# Patient Record
Sex: MALE | Race: WHITE | NOT HISPANIC OR LATINO
[De-identification: names, ages, dates, MRNs, and addresses within clinical notes are randomized per-mention and may not be internally consistent; named-entity substitution may affect disease eponyms.]

---

## 2017-01-11 ENCOUNTER — APPOINTMENT (OUTPATIENT)
Dept: DERMATOLOGY | Facility: CLINIC | Age: 62
End: 2017-01-11

## 2019-05-20 ENCOUNTER — TRANSCRIPTION ENCOUNTER (OUTPATIENT)
Age: 64
End: 2019-05-20

## 2019-09-30 ENCOUNTER — APPOINTMENT (OUTPATIENT)
Dept: DERMATOLOGY | Facility: CLINIC | Age: 64
End: 2019-09-30
Payer: MEDICARE

## 2019-09-30 PROCEDURE — 99214 OFFICE O/P EST MOD 30 MIN: CPT

## 2020-10-01 ENCOUNTER — APPOINTMENT (OUTPATIENT)
Dept: DERMATOLOGY | Facility: CLINIC | Age: 65
End: 2020-10-01
Payer: MEDICARE

## 2020-10-01 PROCEDURE — 99213 OFFICE O/P EST LOW 20 MIN: CPT

## 2021-11-02 ENCOUNTER — APPOINTMENT (OUTPATIENT)
Dept: DERMATOLOGY | Facility: CLINIC | Age: 66
End: 2021-11-02
Payer: MEDICARE

## 2021-11-02 PROCEDURE — 99213 OFFICE O/P EST LOW 20 MIN: CPT

## 2022-01-20 ENCOUNTER — NON-APPOINTMENT (OUTPATIENT)
Age: 67
End: 2022-01-20

## 2022-01-25 ENCOUNTER — APPOINTMENT (OUTPATIENT)
Dept: GASTROENTEROLOGY | Facility: CLINIC | Age: 67
End: 2022-01-25
Payer: MEDICARE

## 2022-01-25 VITALS
WEIGHT: 280 LBS | TEMPERATURE: 97.8 F | OXYGEN SATURATION: 95 % | HEART RATE: 67 BPM | HEIGHT: 71 IN | DIASTOLIC BLOOD PRESSURE: 75 MMHG | BODY MASS INDEX: 39.2 KG/M2 | SYSTOLIC BLOOD PRESSURE: 140 MMHG

## 2022-01-25 DIAGNOSIS — Z78.9 OTHER SPECIFIED HEALTH STATUS: ICD-10-CM

## 2022-01-25 DIAGNOSIS — Z85.46 PERSONAL HISTORY OF MALIGNANT NEOPLASM OF PROSTATE: ICD-10-CM

## 2022-01-25 PROCEDURE — 99203 OFFICE O/P NEW LOW 30 MIN: CPT

## 2022-01-25 RX ORDER — HUMAN INSULIN 100 [IU]/ML
100 INJECTION, SUSPENSION SUBCUTANEOUS
Qty: 200 | Refills: 0 | Status: ACTIVE | COMMUNITY
Start: 2022-01-18

## 2022-01-25 RX ORDER — POLYETHYLENE GLYCOL 3350 AND ELECTROLYTES WITH LEMON FLAVOR 236; 22.74; 6.74; 5.86; 2.97 G/4L; G/4L; G/4L; G/4L; G/4L
236 POWDER, FOR SOLUTION ORAL
Qty: 1 | Refills: 0 | Status: ACTIVE | COMMUNITY
Start: 2022-01-25 | End: 1900-01-01

## 2022-01-25 RX ORDER — ATORVASTATIN CALCIUM 80 MG/1
80 TABLET, FILM COATED ORAL
Qty: 90 | Refills: 0 | Status: ACTIVE | COMMUNITY
Start: 2021-09-09

## 2022-01-25 RX ORDER — METOPROLOL SUCCINATE 25 MG/1
25 TABLET, EXTENDED RELEASE ORAL
Qty: 90 | Refills: 0 | Status: ACTIVE | COMMUNITY
Start: 2021-10-26

## 2022-01-25 RX ORDER — RAMIPRIL 2.5 MG/1
2.5 CAPSULE ORAL
Qty: 180 | Refills: 0 | Status: ACTIVE | COMMUNITY
Start: 2022-01-17

## 2022-01-25 RX ORDER — FENOFIBRATE 160 MG/1
160 TABLET ORAL
Qty: 90 | Refills: 0 | Status: ACTIVE | COMMUNITY
Start: 2021-11-18

## 2022-01-25 NOTE — HISTORY OF PRESENT ILLNESS
[None] : had no significant interval events [Heartburn] : denies heartburn [Nausea] : denies nausea [Vomiting] : denies vomiting [Diarrhea] : denies diarrhea [Constipation] : stable constipation [Yellow Skin Or Eyes (Jaundice)] : denies jaundice [Abdominal Pain] : denies abdominal pain [Abdominal Swelling] : denies abdominal swelling [Rectal Pain] : denies rectal pain [Wt Gain ___ Lbs] : no recent weight gain [Wt Loss ___ Lbs] : no recent weight loss [GERD] : no gastroesophageal reflux disease [Hiatus Hernia] : no hiatus hernia [Peptic Ulcer Disease] : no peptic ulcer disease [Pancreatitis] : no pancreatitis [Cholelithiasis] : no cholelithiasis [Kidney Stone] : no kidney stone [Inflammatory Bowel Disease] : no inflammatory bowel disease [Irritable Bowel Syndrome] : no irritable bowel syndrome [Diverticulitis] : no diverticulitis [Alcohol Abuse] : no alcohol abuse [Malignancy] : no malignancy [Abdominal Surgery] : no abdominal surgery [Appendectomy] : no appendectomy [Cholecystectomy] : no cholecystectomy [de-identified] : This is the patient's first visit here [de-identified] : Patient presents for initial evaluation for repeat low rescreening colonoscopy stating that he had a negative colonoscopy done roughly 20 years ago.  He is chronically constipated but has had no worsening of his chronic constipation and denies gross hematochezia or melena or abdominal pain or unexplained weight loss or anorexia and has no family history of first-degree relatives with colon cancer or colon polyps.

## 2022-01-25 NOTE — ASSESSMENT
[FreeTextEntry1] : Impression: Colon cancer screening rule out colonic neoplasm.  Patient's last colonoscopy was roughly 20 years ago and was negative.\par \par Recommendations: Repeat low risk screening colonoscopy was advised for further evaluation of the above.  The risk versus benefits of low rescreening colonoscopy and intravenous sedation, and alternative testing such as virtual colonoscopy, were individually explained to the patient today who appeared to understand all of the above and was agreeable to proceeding with repeat screening colonoscopy.  Preprocedure cardiac clearance was requested.  His ASA classification is 3.  He will be prepped with GoLYTELY and pending cardiac clearance will be medically optimized for the proposed colonoscopy and appeared to understand all of the above instructions, information, and management plan.

## 2022-01-25 NOTE — REVIEW OF SYSTEMS
[Negative] : Heme/Lymph [As Noted in HPI] : as noted in HPI [Constipation] : constipation [Abdominal Pain] : no abdominal pain [Vomiting] : no vomiting [Diarrhea] : no diarrhea [Heartburn] : no heartburn [Melena] : no melena

## 2022-04-05 ENCOUNTER — LABORATORY RESULT (OUTPATIENT)
Age: 67
End: 2022-04-05

## 2022-04-07 ENCOUNTER — TRANSCRIPTION ENCOUNTER (OUTPATIENT)
Age: 67
End: 2022-04-07

## 2022-04-08 ENCOUNTER — RESULT REVIEW (OUTPATIENT)
Age: 67
End: 2022-04-08

## 2022-04-08 ENCOUNTER — OUTPATIENT (OUTPATIENT)
Dept: OUTPATIENT SERVICES | Facility: HOSPITAL | Age: 67
LOS: 1 days | End: 2022-04-08
Payer: MEDICARE

## 2022-04-08 ENCOUNTER — APPOINTMENT (OUTPATIENT)
Dept: GASTROENTEROLOGY | Facility: GI CENTER | Age: 67
End: 2022-04-08
Payer: MEDICARE

## 2022-04-08 DIAGNOSIS — Z98.89 OTHER SPECIFIED POSTPROCEDURAL STATES: Chronic | ICD-10-CM

## 2022-04-08 DIAGNOSIS — Z12.11 ENCOUNTER FOR SCREENING FOR MALIGNANT NEOPLASM OF COLON: ICD-10-CM

## 2022-04-08 DIAGNOSIS — K64.8 OTHER HEMORRHOIDS: ICD-10-CM

## 2022-04-08 DIAGNOSIS — D12.2 BENIGN NEOPLASM OF ASCENDING COLON: ICD-10-CM

## 2022-04-08 LAB — GLUCOSE BLDC GLUCOMTR-MCNC: 154 MG/DL — HIGH (ref 70–99)

## 2022-04-08 PROCEDURE — 45385 COLONOSCOPY W/LESION REMOVAL: CPT | Mod: PT

## 2022-04-08 PROCEDURE — 82962 GLUCOSE BLOOD TEST: CPT

## 2022-04-08 PROCEDURE — 88305 TISSUE EXAM BY PATHOLOGIST: CPT | Mod: 26

## 2022-04-08 PROCEDURE — 88305 TISSUE EXAM BY PATHOLOGIST: CPT

## 2022-04-13 LAB — SURGICAL PATHOLOGY STUDY: SIGNIFICANT CHANGE UP

## 2022-11-03 ENCOUNTER — APPOINTMENT (OUTPATIENT)
Dept: DERMATOLOGY | Facility: CLINIC | Age: 67
End: 2022-11-03

## 2022-11-03 PROCEDURE — 99213 OFFICE O/P EST LOW 20 MIN: CPT

## 2023-11-02 ENCOUNTER — APPOINTMENT (OUTPATIENT)
Dept: DERMATOLOGY | Facility: CLINIC | Age: 68
End: 2023-11-02
Payer: MEDICARE

## 2023-11-02 PROCEDURE — 99213 OFFICE O/P EST LOW 20 MIN: CPT

## 2024-02-14 ENCOUNTER — APPOINTMENT (OUTPATIENT)
Dept: RHEUMATOLOGY | Facility: CLINIC | Age: 69
End: 2024-02-14
Payer: MEDICARE

## 2024-02-14 VITALS
OXYGEN SATURATION: 95 % | HEIGHT: 71 IN | SYSTOLIC BLOOD PRESSURE: 142 MMHG | TEMPERATURE: 97.1 F | WEIGHT: 258 LBS | BODY MASS INDEX: 36.12 KG/M2 | DIASTOLIC BLOOD PRESSURE: 74 MMHG | HEART RATE: 76 BPM | RESPIRATION RATE: 17 BRPM

## 2024-02-14 DIAGNOSIS — M79.675 PAIN IN LEFT TOE(S): ICD-10-CM

## 2024-02-14 DIAGNOSIS — Z86.39 PERSONAL HISTORY OF OTHER ENDOCRINE, NUTRITIONAL AND METABOLIC DISEASE: ICD-10-CM

## 2024-02-14 PROCEDURE — 99204 OFFICE O/P NEW MOD 45 MIN: CPT

## 2024-02-14 RX ORDER — DULAGLUTIDE 0.75 MG/.5ML
0.75 INJECTION, SOLUTION SUBCUTANEOUS
Qty: 6 | Refills: 0 | Status: DISCONTINUED | COMMUNITY
Start: 2022-01-18 | End: 2024-02-14

## 2024-02-14 RX ORDER — TIRZEPATIDE 10 MG/.5ML
10 INJECTION, SOLUTION SUBCUTANEOUS
Refills: 0 | Status: ACTIVE | COMMUNITY

## 2024-02-14 RX ORDER — EZETIMIBE 10 MG/1
10 TABLET ORAL
Refills: 0 | Status: ACTIVE | COMMUNITY

## 2024-02-20 PROBLEM — M79.675 PAIN AROUND TOENAIL, LEFT FOOT: Status: ACTIVE | Noted: 2024-02-20

## 2024-02-20 NOTE — ASSESSMENT
[FreeTextEntry1] : Currently asymptomatic Ulcer to plantar aspect of Left big toe  ROS and PE otherwise unremarkable for underlying classic CTD/ systemic autoimmune disease/ inflammatory arthritis/ gout  - wound care follow up - f/u as needed  Discussed treatment plan with the patient and his wife. The patient was given the opportunity to ask questions and all questions were answered to their satisfaction.

## 2024-02-20 NOTE — HISTORY OF PRESENT ILLNESS
[FreeTextEntry1] : 68 year old male with PMH as listed below presents today for an initial evaluation for joint pain  Reports to have 1 year hx of ulcer/open wound under Left big toe.  He was following podiatry and recently also started following wound care physician Over the last 4-5 months noticed Left toe anterior also appears to be slightly red Denies pain/swelling to the joint He is presenting today as he is concerned he may have gout No prior attacks  Chart/ labs reviewed  Currently asymptomatic Denies pain/swelling to other joints  denies fever, chills, oral/nasal ulcers, denies chest pain, chest palpitations, denies sob, denies nausea, vomiting, abdominal pain, denies rashes, photosensitivity

## 2024-02-28 ENCOUNTER — APPOINTMENT (OUTPATIENT)
Dept: RHEUMATOLOGY | Facility: CLINIC | Age: 69
End: 2024-02-28

## 2024-05-02 ENCOUNTER — APPOINTMENT (OUTPATIENT)
Dept: DERMATOLOGY | Facility: CLINIC | Age: 69
End: 2024-05-02
Payer: MEDICARE

## 2024-05-02 PROCEDURE — 99213 OFFICE O/P EST LOW 20 MIN: CPT

## 2024-10-03 ENCOUNTER — INPATIENT (INPATIENT)
Facility: HOSPITAL | Age: 69
LOS: 7 days | Discharge: HOME | DRG: 373 | End: 2024-10-11
Attending: STUDENT IN AN ORGANIZED HEALTH CARE EDUCATION/TRAINING PROGRAM | Admitting: STUDENT IN AN ORGANIZED HEALTH CARE EDUCATION/TRAINING PROGRAM
Payer: MEDICARE

## 2024-10-03 VITALS
DIASTOLIC BLOOD PRESSURE: 63 MMHG | SYSTOLIC BLOOD PRESSURE: 148 MMHG | RESPIRATION RATE: 18 BRPM | HEART RATE: 91 BPM | HEIGHT: 71 IN | WEIGHT: 244.05 LBS | OXYGEN SATURATION: 96 % | TEMPERATURE: 98 F

## 2024-10-03 DIAGNOSIS — Z98.89 OTHER SPECIFIED POSTPROCEDURAL STATES: Chronic | ICD-10-CM

## 2024-10-03 LAB
ALBUMIN SERPL ELPH-MCNC: 3.8 G/DL — SIGNIFICANT CHANGE UP (ref 3.3–5.2)
ALP SERPL-CCNC: 85 U/L — SIGNIFICANT CHANGE UP (ref 40–120)
ALT FLD-CCNC: 14 U/L — SIGNIFICANT CHANGE UP
ANION GAP SERPL CALC-SCNC: 15 MMOL/L — SIGNIFICANT CHANGE UP (ref 5–17)
APTT BLD: 28.5 SEC — SIGNIFICANT CHANGE UP (ref 24.5–35.6)
AST SERPL-CCNC: 20 U/L — SIGNIFICANT CHANGE UP
BASOPHILS # BLD AUTO: 0.04 K/UL — SIGNIFICANT CHANGE UP (ref 0–0.2)
BASOPHILS NFR BLD AUTO: 0.3 % — SIGNIFICANT CHANGE UP (ref 0–2)
BILIRUB SERPL-MCNC: 0.7 MG/DL — SIGNIFICANT CHANGE UP (ref 0.4–2)
BUN SERPL-MCNC: 27.5 MG/DL — HIGH (ref 8–20)
CALCIUM SERPL-MCNC: 8.5 MG/DL — SIGNIFICANT CHANGE UP (ref 8.4–10.5)
CHLORIDE SERPL-SCNC: 102 MMOL/L — SIGNIFICANT CHANGE UP (ref 96–108)
CO2 SERPL-SCNC: 21 MMOL/L — LOW (ref 22–29)
CREAT SERPL-MCNC: 1.3 MG/DL — SIGNIFICANT CHANGE UP (ref 0.5–1.3)
EGFR: 59 ML/MIN/1.73M2 — LOW
EOSINOPHIL # BLD AUTO: 0.04 K/UL — SIGNIFICANT CHANGE UP (ref 0–0.5)
EOSINOPHIL NFR BLD AUTO: 0.3 % — SIGNIFICANT CHANGE UP (ref 0–6)
GLUCOSE SERPL-MCNC: 237 MG/DL — HIGH (ref 70–99)
HCT VFR BLD CALC: 32.9 % — LOW (ref 39–50)
HGB BLD-MCNC: 10.3 G/DL — LOW (ref 13–17)
IMM GRANULOCYTES NFR BLD AUTO: 0.3 % — SIGNIFICANT CHANGE UP (ref 0–0.9)
INR BLD: 1.06 RATIO — SIGNIFICANT CHANGE UP (ref 0.85–1.16)
LIDOCAIN IGE QN: 19 U/L — LOW (ref 22–51)
LYMPHOCYTES # BLD AUTO: 1.22 K/UL — SIGNIFICANT CHANGE UP (ref 1–3.3)
LYMPHOCYTES # BLD AUTO: 10.7 % — LOW (ref 13–44)
MCHC RBC-ENTMCNC: 25.5 PG — LOW (ref 27–34)
MCHC RBC-ENTMCNC: 31.3 GM/DL — LOW (ref 32–36)
MCV RBC AUTO: 81.4 FL — SIGNIFICANT CHANGE UP (ref 80–100)
MONOCYTES # BLD AUTO: 0.71 K/UL — SIGNIFICANT CHANGE UP (ref 0–0.9)
MONOCYTES NFR BLD AUTO: 6.2 % — SIGNIFICANT CHANGE UP (ref 2–14)
NEUTROPHILS # BLD AUTO: 9.38 K/UL — HIGH (ref 1.8–7.4)
NEUTROPHILS NFR BLD AUTO: 82.2 % — HIGH (ref 43–77)
PLATELET # BLD AUTO: 274 K/UL — SIGNIFICANT CHANGE UP (ref 150–400)
POTASSIUM SERPL-MCNC: 4.5 MMOL/L — SIGNIFICANT CHANGE UP (ref 3.5–5.3)
POTASSIUM SERPL-SCNC: 4.5 MMOL/L — SIGNIFICANT CHANGE UP (ref 3.5–5.3)
PROT SERPL-MCNC: 7.2 G/DL — SIGNIFICANT CHANGE UP (ref 6.6–8.7)
PROTHROM AB SERPL-ACNC: 12.3 SEC — SIGNIFICANT CHANGE UP (ref 9.9–13.4)
RBC # BLD: 4.04 M/UL — LOW (ref 4.2–5.8)
RBC # FLD: 15.8 % — HIGH (ref 10.3–14.5)
SODIUM SERPL-SCNC: 138 MMOL/L — SIGNIFICANT CHANGE UP (ref 135–145)
WBC # BLD: 11.43 K/UL — HIGH (ref 3.8–10.5)
WBC # FLD AUTO: 11.43 K/UL — HIGH (ref 3.8–10.5)

## 2024-10-03 PROCEDURE — 99285 EMERGENCY DEPT VISIT HI MDM: CPT | Mod: GC

## 2024-10-03 PROCEDURE — 71045 X-RAY EXAM CHEST 1 VIEW: CPT | Mod: 26

## 2024-10-03 RX ORDER — SODIUM CHLORIDE 0.9 % (FLUSH) 0.9 %
2300 SYRINGE (ML) INJECTION ONCE
Refills: 0 | Status: COMPLETED | OUTPATIENT
Start: 2024-10-03 | End: 2024-10-03

## 2024-10-03 RX ADMIN — Medication 2300 MILLILITER(S): at 22:40

## 2024-10-03 NOTE — ED PROVIDER NOTE - PHYSICAL EXAMINATION
General: Awake, alert, lying in bed in NAD  HEENT: Normocephalic, atraumatic. No scleral icterus or conjunctival injection. EOMI. Moist mucous membranes. Oropharynx clear.   Neck:. Soft and supple.  Cardiac: RRR, Peripheral pulses 2+ and symmetric. No LE edema.  Resp: Lungs CTAB. No accessory muscle use  Abd: Soft, left and midline lower abdominal tenderness, non-distended. No guarding, rebound, or rigidity.  Back: Spine midline and non-tender.   Skin: No rashes, abrasions, or lacerations.  Neuro: AO x 4. Moves all extremities symmetrically. Motor strength and sensation grossly intact.  Psych: Appropriate mood and affect

## 2024-10-03 NOTE — ED PROVIDER NOTE - CLINICAL SUMMARY MEDICAL DECISION MAKING FREE TEXT BOX
69y male w/ pmh of HTN, HLD, DM, CABGx3, prostate removal presenting with a two day history of lower abdominal pain. this morning he had an episode of vomiting and he developed chills a few hours ago. patient was seen at urgent care and told to come to the ED for imaging. patient also noted loose stool today. denies any blood in the vomit or stool. denies cough, congestion, CP, SOB, urinary symptoms. denies any sick contacts or recent travel. states he was chopping wood a day ago and may have strained a muscle. 69y male w/ pmh of HTN, HLD, DM, CABGx3, prostate removal presenting with a two day history of lower abdominal pain. patient with moderate lower abdominal pain on exam, otherwise well appearing, nontoxic. septic workup sent given hx of fever. CT scan showing perforated appendicitis and cecal mass. surgery consulted. patient to be admitted for further evaluation and treatment by surgical team.

## 2024-10-03 NOTE — ED PROVIDER NOTE - NSICDXPASTMEDICALHX_GEN_ALL_CORE_FT
PAST MEDICAL HISTORY:  Arthritis     Diabetes mellitus Type 1    Hyperlipidemia     Hypertension

## 2024-10-03 NOTE — ED ADULT TRIAGE NOTE - IDEAL BODY WEIGHT(KG)
PATIENT NAME: NEGIN WHITEHEAD   MEDICAL RECORD NUMBER: [id number]   ACCOUNT NUMBER: [id number]   ATTENDING PHYSICIAN: GABI RODRIGUEZ M.D.   ADMIT DATE: 03/01/2017   DISCHARGE DATE: 03/01/2017   ROOM #:   SERVICE: NUC                                 Pharmacological Stress Test         DATE OF STUDY:  03/01/2017      Dictated By:  Gabi Rodriguez M.D.      STUDY PERFORMED:  Lexiscan stress test with SPECT imaging.      REASON FOR STUDY:  The patient with coronary artery disease.      FINDINGS:  After informed consent was obtained, the patient was brought forward   to the noninvasive cardiac laboratory at [facility], where he   underwent a Lexiscan stress test with SPECT imaging.      Resting heart rate was 67.  Resting blood pressure __________.  Resting EKG   revealed nonspecific ST-T wave changes present.      Per protocol, Lexiscan was infused followed by injection of myocardial imaging   agent.      Peak infusion heart rate was 91.  Peak infusion blood pressure was 157/85.  Peak   infusion EKG was indeterminate.  She failed to achieve a target heart rate.      At the peak of the infusion, myocardial imaging agent was injected.      IMPRESSION:   1. Indeterminate Lexiscan stress EKG as the patient failed to achieve a target       heart rate.   2. No clinical signs for coronary insufficiency were present.   3. No arrhythmias noted.   4. Nuclear medicine imaging will be reported separately by the Department of       Radiology.         _________________________________   Gabi Rodriguez M.D.      CC:   FRANK Padilla M.D. George Christy, M.D. [facility]   DD: 03/02/2017  DT: 03/03/2017   TD: 09:36  TT: 02:30   [id number]         75

## 2024-10-03 NOTE — ED PROVIDER NOTE - ATTENDING CONTRIBUTION TO CARE
69y M w/ hx HTN, HLD, DM, CAD s/p CABGx3, prostatectomy; presents for abdominal pain. Pt reports 2 days of lower abdominal pain associated with vomiting and diarrhea. Had subjective fever and chills tonight. No cough or congestion. Took ibuprofen at 7pm. Was seen at  and advised to go to the ED for evaluation. On exam pt in no acute distress, abdomen soft with b/l lower abdominal tenderness. No CVAT. Will check labs, urine, CT abd/pelvis.

## 2024-10-03 NOTE — ED PROVIDER NOTE - OBJECTIVE STATEMENT
69y male w/ pmh of HTN, HLD, DM, CABGx3, prostate removal presenting with a two day history of lower abdominal pain. this morning he had an episode of vomiting and he developed chills a few hours ago. patient was seen at urgent care and told to come to the ED for imaging. patient also noted loose stool today. denies any blood in the vomit or stool. denies cough, congestion, CP, SOB, urinary symptoms. denies any sick contacts or recent travel. states he was chopping wood a day ago and may have strained a muscle. Patient took 3 motrin prior to arrival.

## 2024-10-03 NOTE — ED PROVIDER NOTE - NSICDXFAMILYHX_GEN_ALL_CORE_FT
FAMILY HISTORY:  Father  Still living? Unknown  CAD (coronary artery disease), Age at diagnosis: 51-60  Family history of myocardial infarction, Age at diagnosis: 51-60    Sibling  Still living? Unknown  CAD (coronary artery disease), Age at diagnosis: 61-70  Family history of myocardial infarction, Age at diagnosis: 41-50  CAD (coronary artery disease), Age at diagnosis: 41-50  Family history of myocardial infarction, Age at diagnosis: 61-70

## 2024-10-04 ENCOUNTER — RESULT REVIEW (OUTPATIENT)
Age: 69
End: 2024-10-04

## 2024-10-04 DIAGNOSIS — K35.32 ACUTE APPENDICITIS WITH PERFORATION, LOCALIZED PERITONITIS, AND GANGRENE, WITHOUT ABSCESS: ICD-10-CM

## 2024-10-04 LAB
ALBUMIN SERPL ELPH-MCNC: 3.5 G/DL — SIGNIFICANT CHANGE UP (ref 3.3–5.2)
ALP SERPL-CCNC: 72 U/L — SIGNIFICANT CHANGE UP (ref 40–120)
ALP SERPL-CCNC: 80 U/L — SIGNIFICANT CHANGE UP (ref 40–120)
ALT FLD-CCNC: 12 U/L — SIGNIFICANT CHANGE UP
ANION GAP SERPL CALC-SCNC: 12 MMOL/L — SIGNIFICANT CHANGE UP (ref 5–17)
APPEARANCE UR: CLEAR — SIGNIFICANT CHANGE UP
APPEARANCE UR: CLEAR — SIGNIFICANT CHANGE UP
AST SERPL-CCNC: 15 U/L — SIGNIFICANT CHANGE UP
BACTERIA # UR AUTO: NEGATIVE /HPF — SIGNIFICANT CHANGE UP
BACTERIA # UR AUTO: NEGATIVE /HPF — SIGNIFICANT CHANGE UP
BASOPHILS # BLD AUTO: 0.03 K/UL — SIGNIFICANT CHANGE UP (ref 0–0.2)
BASOPHILS NFR BLD AUTO: 0.4 % — SIGNIFICANT CHANGE UP (ref 0–2)
BILIRUB SERPL-MCNC: 0.8 MG/DL — SIGNIFICANT CHANGE UP (ref 0.4–2)
BILIRUB UR-MCNC: NEGATIVE — SIGNIFICANT CHANGE UP
BILIRUB UR-MCNC: NEGATIVE — SIGNIFICANT CHANGE UP
BLD GP AB SCN SERPL QL: SIGNIFICANT CHANGE UP
BUN SERPL-MCNC: 21.5 MG/DL — HIGH (ref 8–20)
CALCIUM SERPL-MCNC: 8 MG/DL — LOW (ref 8.4–10.5)
CAST: 0 /LPF — SIGNIFICANT CHANGE UP (ref 0–4)
CAST: 2 /LPF — SIGNIFICANT CHANGE UP (ref 0–4)
CEA SERPL-MCNC: 25.4 NG/ML — HIGH (ref 0–3.8)
CHLORIDE SERPL-SCNC: 107 MMOL/L — SIGNIFICANT CHANGE UP (ref 96–108)
CO2 SERPL-SCNC: 20 MMOL/L — LOW (ref 22–29)
COLOR SPEC: SIGNIFICANT CHANGE UP
COLOR SPEC: YELLOW — SIGNIFICANT CHANGE UP
CREAT SERPL-MCNC: 1.15 MG/DL — SIGNIFICANT CHANGE UP (ref 0.5–1.3)
DIFF PNL FLD: NEGATIVE — SIGNIFICANT CHANGE UP
DIFF PNL FLD: NEGATIVE — SIGNIFICANT CHANGE UP
EGFR: 68 ML/MIN/1.73M2 — SIGNIFICANT CHANGE UP
EOSINOPHIL # BLD AUTO: 0.03 K/UL — SIGNIFICANT CHANGE UP (ref 0–0.5)
EOSINOPHIL NFR BLD AUTO: 0.4 % — SIGNIFICANT CHANGE UP (ref 0–6)
FLUAV AG NPH QL: SIGNIFICANT CHANGE UP
FLUBV AG NPH QL: SIGNIFICANT CHANGE UP
GLUCOSE BLDC GLUCOMTR-MCNC: 156 MG/DL — HIGH (ref 70–99)
GLUCOSE BLDC GLUCOMTR-MCNC: 162 MG/DL — HIGH (ref 70–99)
GLUCOSE BLDC GLUCOMTR-MCNC: 180 MG/DL — HIGH (ref 70–99)
GLUCOSE BLDC GLUCOMTR-MCNC: 193 MG/DL — HIGH (ref 70–99)
GLUCOSE SERPL-MCNC: 188 MG/DL — HIGH (ref 70–99)
GLUCOSE UR QL: 250 MG/DL
GLUCOSE UR QL: 500 MG/DL
HCT VFR BLD CALC: 29.8 % — LOW (ref 39–50)
HGB BLD-MCNC: 9.3 G/DL — LOW (ref 13–17)
IMM GRANULOCYTES NFR BLD AUTO: 0.2 % — SIGNIFICANT CHANGE UP (ref 0–0.9)
KETONES UR-MCNC: ABNORMAL MG/DL
KETONES UR-MCNC: NEGATIVE MG/DL — SIGNIFICANT CHANGE UP
LACTATE SERPL-SCNC: 1.3 MMOL/L — SIGNIFICANT CHANGE UP (ref 0.5–2)
LEUKOCYTE ESTERASE UR-ACNC: NEGATIVE — SIGNIFICANT CHANGE UP
LEUKOCYTE ESTERASE UR-ACNC: NEGATIVE — SIGNIFICANT CHANGE UP
LYMPHOCYTES # BLD AUTO: 1.18 K/UL — SIGNIFICANT CHANGE UP (ref 1–3.3)
LYMPHOCYTES # BLD AUTO: 14.5 % — SIGNIFICANT CHANGE UP (ref 13–44)
MCHC RBC-ENTMCNC: 25.4 PG — LOW (ref 27–34)
MCHC RBC-ENTMCNC: 31.2 GM/DL — LOW (ref 32–36)
MCV RBC AUTO: 81.4 FL — SIGNIFICANT CHANGE UP (ref 80–100)
MONOCYTES # BLD AUTO: 0.57 K/UL — SIGNIFICANT CHANGE UP (ref 0–0.9)
MONOCYTES NFR BLD AUTO: 7 % — SIGNIFICANT CHANGE UP (ref 2–14)
NEUTROPHILS # BLD AUTO: 6.31 K/UL — SIGNIFICANT CHANGE UP (ref 1.8–7.4)
NEUTROPHILS NFR BLD AUTO: 77.5 % — HIGH (ref 43–77)
NITRITE UR-MCNC: NEGATIVE — SIGNIFICANT CHANGE UP
NITRITE UR-MCNC: NEGATIVE — SIGNIFICANT CHANGE UP
PH UR: 6 — SIGNIFICANT CHANGE UP (ref 5–8)
PH UR: 7 — SIGNIFICANT CHANGE UP (ref 5–8)
PLATELET # BLD AUTO: 236 K/UL — SIGNIFICANT CHANGE UP (ref 150–400)
POTASSIUM SERPL-MCNC: 4.3 MMOL/L — SIGNIFICANT CHANGE UP (ref 3.5–5.3)
POTASSIUM SERPL-SCNC: 4.3 MMOL/L — SIGNIFICANT CHANGE UP (ref 3.5–5.3)
PROT SERPL-MCNC: 6.4 G/DL — LOW (ref 6.6–8.7)
PROT UR-MCNC: 100 MG/DL
PROT UR-MCNC: 100 MG/DL
RBC # BLD: 3.66 M/UL — LOW (ref 4.2–5.8)
RBC # FLD: 15.7 % — HIGH (ref 10.3–14.5)
RBC CASTS # UR COMP ASSIST: 0 /HPF — SIGNIFICANT CHANGE UP (ref 0–4)
RBC CASTS # UR COMP ASSIST: 1 /HPF — SIGNIFICANT CHANGE UP (ref 0–4)
RSV RNA NPH QL NAA+NON-PROBE: SIGNIFICANT CHANGE UP
SARS-COV-2 RNA SPEC QL NAA+PROBE: SIGNIFICANT CHANGE UP
SODIUM SERPL-SCNC: 139 MMOL/L — SIGNIFICANT CHANGE UP (ref 135–145)
SP GR SPEC: >1.03 — HIGH (ref 1–1.03)
SP GR SPEC: >1.03 — HIGH (ref 1–1.03)
SQUAMOUS # UR AUTO: 0 /HPF — SIGNIFICANT CHANGE UP (ref 0–5)
SQUAMOUS # UR AUTO: 2 /HPF — SIGNIFICANT CHANGE UP (ref 0–5)
UROBILINOGEN FLD QL: 1 MG/DL — SIGNIFICANT CHANGE UP (ref 0.2–1)
UROBILINOGEN FLD QL: 1 MG/DL — SIGNIFICANT CHANGE UP (ref 0.2–1)
WBC # BLD: 8.14 K/UL — SIGNIFICANT CHANGE UP (ref 3.8–10.5)
WBC # FLD AUTO: 8.14 K/UL — SIGNIFICANT CHANGE UP (ref 3.8–10.5)
WBC UR QL: 0 /HPF — SIGNIFICANT CHANGE UP (ref 0–5)
WBC UR QL: 0 /HPF — SIGNIFICANT CHANGE UP (ref 0–5)

## 2024-10-04 PROCEDURE — 88307 TISSUE EXAM BY PATHOLOGIST: CPT | Mod: 26

## 2024-10-04 PROCEDURE — 99223 1ST HOSP IP/OBS HIGH 75: CPT

## 2024-10-04 PROCEDURE — 74177 CT ABD & PELVIS W/CONTRAST: CPT | Mod: 26,MC

## 2024-10-04 PROCEDURE — 88309 TISSUE EXAM BY PATHOLOGIST: CPT | Mod: 26

## 2024-10-04 PROCEDURE — 93306 TTE W/DOPPLER COMPLETE: CPT | Mod: 26

## 2024-10-04 PROCEDURE — 88342 IMHCHEM/IMCYTCHM 1ST ANTB: CPT | Mod: 26

## 2024-10-04 PROCEDURE — 93010 ELECTROCARDIOGRAM REPORT: CPT

## 2024-10-04 PROCEDURE — 71260 CT THORAX DX C+: CPT | Mod: 26

## 2024-10-04 RX ORDER — ALCOHOL ANTISEPTIC PADS
25 PADS, MEDICATED (EA) TOPICAL ONCE
Refills: 0 | Status: DISCONTINUED | OUTPATIENT
Start: 2024-10-04 | End: 2024-10-07

## 2024-10-04 RX ORDER — PIPERACILLIN SODIUM AND TAZOBACTAM SODIUM 12; 1.5 G/60ML; G/60ML
3.38 INJECTION, POWDER, LYOPHILIZED, FOR SOLUTION INTRAVENOUS ONCE
Refills: 0 | Status: COMPLETED | OUTPATIENT
Start: 2024-10-04 | End: 2024-10-04

## 2024-10-04 RX ORDER — ALCOHOL ANTISEPTIC PADS
15 PADS, MEDICATED (EA) TOPICAL ONCE
Refills: 0 | Status: DISCONTINUED | OUTPATIENT
Start: 2024-10-04 | End: 2024-10-07

## 2024-10-04 RX ORDER — PIPERACILLIN SODIUM AND TAZOBACTAM SODIUM 12; 1.5 G/60ML; G/60ML
3.38 INJECTION, POWDER, LYOPHILIZED, FOR SOLUTION INTRAVENOUS EVERY 8 HOURS
Refills: 0 | Status: DISCONTINUED | OUTPATIENT
Start: 2024-10-04 | End: 2024-10-07

## 2024-10-04 RX ORDER — ACETAMINOPHEN 325 MG
975 TABLET ORAL EVERY 6 HOURS
Refills: 0 | Status: DISCONTINUED | OUTPATIENT
Start: 2024-10-04 | End: 2024-10-07

## 2024-10-04 RX ORDER — ENOXAPARIN SODIUM 150 MG/ML
40 INJECTION SUBCUTANEOUS EVERY 24 HOURS
Refills: 0 | Status: DISCONTINUED | OUTPATIENT
Start: 2024-10-05 | End: 2024-10-07

## 2024-10-04 RX ORDER — METOPROLOL TARTRATE 50 MG
25 TABLET ORAL EVERY 12 HOURS
Refills: 0 | Status: DISCONTINUED | OUTPATIENT
Start: 2024-10-04 | End: 2024-10-07

## 2024-10-04 RX ORDER — ALCOHOL ANTISEPTIC PADS
12.5 PADS, MEDICATED (EA) TOPICAL ONCE
Refills: 0 | Status: DISCONTINUED | OUTPATIENT
Start: 2024-10-04 | End: 2024-10-07

## 2024-10-04 RX ORDER — SODIUM CHLORIDE IRRIG SOLUTION 0.9 %
1000 SOLUTION, IRRIGATION IRRIGATION
Refills: 0 | Status: DISCONTINUED | OUTPATIENT
Start: 2024-10-04 | End: 2024-10-07

## 2024-10-04 RX ORDER — INSULIN LISPRO 100/ML
VIAL (ML) SUBCUTANEOUS
Refills: 0 | Status: DISCONTINUED | OUTPATIENT
Start: 2024-10-04 | End: 2024-10-04

## 2024-10-04 RX ORDER — PANTOPRAZOLE SODIUM 40 MG/1
40 TABLET, DELAYED RELEASE ORAL DAILY
Refills: 0 | Status: DISCONTINUED | OUTPATIENT
Start: 2024-10-04 | End: 2024-10-07

## 2024-10-04 RX ORDER — MORPHINE SULFATE 30 MG/1
4 TABLET, FILM COATED, EXTENDED RELEASE ORAL EVERY 6 HOURS
Refills: 0 | Status: DISCONTINUED | OUTPATIENT
Start: 2024-10-04 | End: 2024-10-07

## 2024-10-04 RX ORDER — ATORVASTATIN CALCIUM 10 MG/1
40 TABLET, FILM COATED ORAL AT BEDTIME
Refills: 0 | Status: DISCONTINUED | OUTPATIENT
Start: 2024-10-04 | End: 2024-10-07

## 2024-10-04 RX ORDER — INSULIN GLARGINE 300 U/ML
20 INJECTION, SOLUTION SUBCUTANEOUS AT BEDTIME
Refills: 0 | Status: DISCONTINUED | OUTPATIENT
Start: 2024-10-04 | End: 2024-10-07

## 2024-10-04 RX ORDER — MORPHINE SULFATE 30 MG/1
2 TABLET, FILM COATED, EXTENDED RELEASE ORAL EVERY 6 HOURS
Refills: 0 | Status: DISCONTINUED | OUTPATIENT
Start: 2024-10-04 | End: 2024-10-07

## 2024-10-04 RX ORDER — ASPIRIN 325 MG
81 TABLET ORAL DAILY
Refills: 0 | Status: DISCONTINUED | OUTPATIENT
Start: 2024-10-04 | End: 2024-10-04

## 2024-10-04 RX ORDER — INFLUENZA VIRUS VACCINE 15; 15; 15; 15 UG/.5ML; UG/.5ML; UG/.5ML; UG/.5ML
0.5 SUSPENSION INTRAMUSCULAR ONCE
Refills: 0 | Status: DISCONTINUED | OUTPATIENT
Start: 2024-10-04 | End: 2024-10-11

## 2024-10-04 RX ORDER — INSULIN LISPRO 100/ML
VIAL (ML) SUBCUTANEOUS AT BEDTIME
Refills: 0 | Status: DISCONTINUED | OUTPATIENT
Start: 2024-10-04 | End: 2024-10-04

## 2024-10-04 RX ORDER — INSULIN LISPRO 100/ML
VIAL (ML) SUBCUTANEOUS EVERY 6 HOURS
Refills: 0 | Status: DISCONTINUED | OUTPATIENT
Start: 2024-10-04 | End: 2024-10-07

## 2024-10-04 RX ORDER — GLUCAGON INJECTION, SOLUTION 0.5 MG/.1ML
1 INJECTION, SOLUTION SUBCUTANEOUS ONCE
Refills: 0 | Status: DISCONTINUED | OUTPATIENT
Start: 2024-10-04 | End: 2024-10-07

## 2024-10-04 RX ADMIN — Medication 40 MILLIGRAM(S): at 06:00

## 2024-10-04 RX ADMIN — Medication 100 MILLILITER(S): at 21:47

## 2024-10-04 RX ADMIN — Medication 100 MILLILITER(S): at 06:19

## 2024-10-04 RX ADMIN — PIPERACILLIN SODIUM AND TAZOBACTAM SODIUM 25 GRAM(S): 12; 1.5 INJECTION, POWDER, LYOPHILIZED, FOR SOLUTION INTRAVENOUS at 21:47

## 2024-10-04 RX ADMIN — Medication 2: at 12:30

## 2024-10-04 RX ADMIN — PIPERACILLIN SODIUM AND TAZOBACTAM SODIUM 25 GRAM(S): 12; 1.5 INJECTION, POWDER, LYOPHILIZED, FOR SOLUTION INTRAVENOUS at 06:00

## 2024-10-04 RX ADMIN — Medication 25 MILLIGRAM(S): at 06:00

## 2024-10-04 RX ADMIN — Medication 975 MILLIGRAM(S): at 06:00

## 2024-10-04 RX ADMIN — PANTOPRAZOLE SODIUM 40 MILLIGRAM(S): 40 TABLET, DELAYED RELEASE ORAL at 10:51

## 2024-10-04 RX ADMIN — Medication 2: at 18:16

## 2024-10-04 RX ADMIN — Medication 81 MILLIGRAM(S): at 10:50

## 2024-10-04 RX ADMIN — INSULIN GLARGINE 20 UNIT(S): 300 INJECTION, SOLUTION SUBCUTANEOUS at 21:47

## 2024-10-04 RX ADMIN — Medication 25 MILLIGRAM(S): at 18:17

## 2024-10-04 RX ADMIN — ATORVASTATIN CALCIUM 40 MILLIGRAM(S): 10 TABLET, FILM COATED ORAL at 21:48

## 2024-10-04 RX ADMIN — PIPERACILLIN SODIUM AND TAZOBACTAM SODIUM 200 GRAM(S): 12; 1.5 INJECTION, POWDER, LYOPHILIZED, FOR SOLUTION INTRAVENOUS at 01:33

## 2024-10-04 RX ADMIN — Medication 975 MILLIGRAM(S): at 18:17

## 2024-10-04 RX ADMIN — Medication 2: at 08:43

## 2024-10-04 RX ADMIN — PIPERACILLIN SODIUM AND TAZOBACTAM SODIUM 25 GRAM(S): 12; 1.5 INJECTION, POWDER, LYOPHILIZED, FOR SOLUTION INTRAVENOUS at 13:59

## 2024-10-04 RX ADMIN — Medication 975 MILLIGRAM(S): at 10:49

## 2024-10-04 RX ADMIN — Medication 2300 MILLILITER(S): at 01:00

## 2024-10-04 NOTE — CONSULT NOTE ADULT - SUBJECTIVE AND OBJECTIVE BOX
Patient is a 69y old  Male who presents with a chief complaint of cecal mass and perforated appendicitis (04 Oct 2024 11:18)      HPI: 69y male w/ pmh of HTN, HLD, DM, CABGx3, prostate removal (for prostatic cancer) presenting with a two day history of lower abdominal pain and subjective fever , on presentation patient complain of lower abdominal pain associated with nausea/ vomitingx2 and diarrhea, reports some recent weight loss last few months , last colonoscopy was 2 years ago (per patient found to have polyps however was benign) , denies family history of colon cancer , denies lightheadedness/dizziness, denies SOB/chest pain,    ROS: 10-system review is otherwise negative except HPI above.      PAST MEDICAL HISTORY  Diabetes mellitus  Hypertension  Hyperlipidemia  Arthritis    SURGICAL HISTORY:  S/P tonsillectomy  CABG      FAMILY HISTORY:  Family history of myocardial infarction (Father, Sibling, Sibling)  CAD (coronary artery disease) (Father, Sibling, Sibling)    ALLERGIES: NKA shellfish (Other; Rash)  No Known Drug Allergies    Social Hx  Former tobacco use  denies alcohol use  Denies drug use    Home Medications:  aspirin buffered 81 mg oral tablet:  orally once a day   atorvastatin 80 mg oral tablet: 1 tab(s) orally once a day (at bedtime)   metFORMIN 1000 mg oral tablet daily  metoprolol succinate 25mg daily  NovoLIN N 100 units/mL subcutaneous suspension 70 units in the morning and 50 units in the evening  ramipril 2.5 mg BID  Mounjao 12.5mg once per week  Fenofibrate 160mg once a day  Ezetimibe 10mg once a day        Allergies    shellfish (Other; Rash)  No Known Drug Allergies        REVIEW OF SYSTEMS:    CONSTITUTIONAL: No fever, weight loss, or fatigue  EYES: No eye pain, visual disturbances, or discharge  NECK: No pain or stiffness  RESPIRATORY: No cough, wheezing, chills or hemoptysis; No shortness of breath  CARDIOVASCULAR: No chest pain, palpitations, dizziness, or leg swelling  GASTROINTESTINAL: as per HPI  GENITOURINARY: No dysuria, frequency, hematuria, or incontinence  NEUROLOGICAL: No headaches, memory loss, loss of strength, numbness, or tremors  SKIN: No itching, burning, rashes, or lesions   LYMPH NODES: No enlarged glands  ENDOCRINE: No heat or cold intolerance; No hair loss  MUSCULOSKELETAL: no joint pain  PSYCHIATRIC: No depression, anxiety, mood swings, or difficulty sleeping  HEME/LYMPH: No easy bruising, or bleeding gums  ALLERGY AND IMMUNOLOGIC: No hives or eczema    MEDICATIONS  (STANDING):  acetaminophen     Tablet .. 975 milliGRAM(s) Oral every 6 hours  aspirin enteric coated 81 milliGRAM(s) Oral daily  atorvastatin 40 milliGRAM(s) Oral at bedtime  dextrose 5%. 1000 milliLiter(s) (100 mL/Hr) IV Continuous <Continuous>  dextrose 5%. 1000 milliLiter(s) (50 mL/Hr) IV Continuous <Continuous>  dextrose 50% Injectable 12.5 Gram(s) IV Push once  dextrose 50% Injectable 25 Gram(s) IV Push once  dextrose 50% Injectable 25 Gram(s) IV Push once  glucagon  Injectable 1 milliGRAM(s) IntraMuscular once  influenza  Vaccine (HIGH DOSE) 0.5 milliLiter(s) IntraMuscular once  insulin lispro (ADMELOG) corrective regimen sliding scale   SubCutaneous at bedtime  insulin lispro (ADMELOG) corrective regimen sliding scale   SubCutaneous three times a day before meals  lactated ringers. 1000 milliLiter(s) (100 mL/Hr) IV Continuous <Continuous>  lisinopril 40 milliGRAM(s) Oral daily  metoprolol tartrate 25 milliGRAM(s) Oral every 12 hours  pantoprazole  Injectable 40 milliGRAM(s) IV Push daily  piperacillin/tazobactam IVPB.. 3.375 Gram(s) IV Intermittent every 8 hours    MEDICATIONS  (PRN):  dextrose Oral Gel 15 Gram(s) Oral once PRN Blood Glucose LESS THAN 70 milliGRAM(s)/deciliter  morphine  - Injectable 4 milliGRAM(s) IV Push every 6 hours PRN Severe Pain (7 - 10)  morphine  - Injectable 2 milliGRAM(s) IV Push every 6 hours PRN Moderate Pain (4 - 6)      Vital Signs Last 24 Hrs  T(C): 36.7 (04 Oct 2024 11:45), Max: 37.4 (04 Oct 2024 01:52)  T(F): 98 (04 Oct 2024 11:45), Max: 99.4 (04 Oct 2024 01:52)  HR: 71 (04 Oct 2024 11:45) (71 - 91)  BP: 135/65 (04 Oct 2024 11:45) (135/65 - 168/78)  BP(mean): --  RR: 18 (04 Oct 2024 11:45) (17 - 18)  SpO2: 96% (04 Oct 2024 11:45) (95% - 98%)    Parameters below as of 04 Oct 2024 11:45  Patient On (Oxygen Delivery Method): room air        PHYSICAL EXAM:    GENERAL: NAD  HEAD:  Atraumatic, Normocephalic  EYES: EOMI, PERRLA, conjunctiva and sclera clear  NECK: Supple, No JVD, Normal thyroid  NERVOUS SYSTEM:  Alert & Oriented X3, Good concentration; Motor Strength 5/5 B/L upper and lower extremities;   CHEST/LUNG: CTA  b/l,  no rales, rhonchi, wheezing, or rubs  HEART: Regular rate and rhythm; No murmurs, rubs, or gallops  ABDOMEN: distended, + tenderness  EXTREMITIES: left foot in boot  LYMPH: No lymphadenopathy noted  SKIN: No rashes or lesions    LABS:                        9.3    8.14  )-----------( 236      ( 04 Oct 2024 04:50 )             29.8     10-04    139  |  107  |  21.5[H]  ----------------------------<  188[H]  4.3   |  20.0[L]  |  1.15    Ca    8.0[L]      04 Oct 2024 04:50    TPro  6.4[L]  /  Alb  3.5  /  TBili  0.8  /  DBili  x   /  AST  15  /  ALT  12  /  AlkPhos  72  10-04    PT/INR - ( 03 Oct 2024 23:07 )   PT: 12.3 sec;   INR: 1.06 ratio         PTT - ( 03 Oct 2024 23:07 )  PTT:28.5 sec  Urinalysis Basic - ( 04 Oct 2024 04:50 )    Color: x / Appearance: x / SG: x / pH: x  Gluc: 188 mg/dL / Ketone: x  / Bili: x / Urobili: x   Blood: x / Protein: x / Nitrite: x   Leuk Esterase: x / RBC: x / WBC x   Sq Epi: x / Non Sq Epi: x / Bacteria: x          RADIOLOGY & ADDITIONAL STUDIES:

## 2024-10-04 NOTE — PATIENT PROFILE ADULT - FUNCTIONAL ASSESSMENT - BASIC MOBILITY SECTION LABEL
.
66 yo M with pmh of HTN, HLD, re DM s/p 1 stent to mLAD yesterday at [facility] c/o LSB chest pressure. Pt had 3 episodes one at 12am, 3am and 5am. Associated with sweats. Symptoms lasted about 10 min. Now feels mild discomfort. Denies fever, chills, sob, cough, n/v, dizziness, palpitations. Pt took his asa and plavix this morning.

## 2024-10-04 NOTE — CONSULT NOTE ADULT - SUBJECTIVE AND OBJECTIVE BOX
Patient is a 69y old  Male who presents with a chief complaint of cecal mass and perforated appendicitis (04 Oct 2024 12:47)    HPI:   69M obese w/ T2DM, HTN, HLD, CABGx3, prostate removal (for prostatic cancer) presenting with a 2 day hx of lower abd pain with subjective fever, found on CT to have perforated appendicitis  Consult for diabetes mgmt      PAST MEDICAL & SURGICAL HISTORY:  Diabetes mellitus  Type 2      Hypertension      Hyperlipidemia      Arthritis      S/P tonsillectomy        FAMILY HISTORY:  Family history of myocardial infarction (Father, Sibling, Sibling)    CAD (coronary artery disease) (Father, Sibling, Sibling)      Family history not pertinent as reviewed with the patient.    SOCIAL HISTORY:  Denies any toxic habits    ALLERGIES: NKA shellfish (Other; Rash)  No Known Drug Allergies        Home Medications:  acetaminophen-oxyCODONE 325 mg-5 mg oral tablet: 1 tab(s) orally every 4 hours, As needed, Moderate Pain (16 Aug 2015 10:21)  Aldactone 25 mg oral tablet: 2 tab(s) orally once a day (16 Aug 2015 10:21)  aspirin buffered 325 mg oral tablet:  orally once a day (16 Aug 2015 10:15)  atorvastatin 40 mg oral tablet: 1 tab(s) orally once a day (at bedtime) (16 Aug 2015 10:15)  Lasix 40 mg oral tablet: 1 tab(s) orally once a day (16 Aug 2015 10:21)  metFORMIN 1000 mg oral tablet: 1 tab(s) orally 2 times a day (with meals) (16 Aug 2015 10:21)  metoprolol tartrate 25 mg oral tablet: 1 tab(s) orally every 12 hours (16 Aug 2015 10:21)  NovoLIN N 100 units/mL subcutaneous suspension: 120 unit(s) subcutaneous 2 times a day (11 Aug 2015 06:05)  ramipril 10 mg oral capsule: 1 cap(s) orally once a day (16 Aug 2015 10:15)      --------------------------------------------------------------------------------------------    PHYSICAL EXAM:   General: NAD, Lying in bed comfortably  Neuro: A+Ox3  HEENT: EOMI, PERRLA, MMM  Cardio: RRR  Resp: Non labored breathing on RA  GI/Abd: Soft, lower abdominal tenderness /ND, no rebound/guarding,   Vascular: All 4 extremities warm and well perfused.   Pelvis: stable  Musculoskeletal: All 4 extremities moving spontaneously, no limitations, no spinal tenderness.  --------------------------------------------------------------------------------------------    LABS                 10.3   11.43  )----------(  274       ( 03 Oct 2024 23:07 )               32.9      138    |  102    |  27.5   ----------------------------<  237        ( 03 Oct 2024 23:07 )  4.5     |  21.0   |  1.30     Ca    8.5        ( 03 Oct 2024 23:07 )    TPro  7.2    /  Alb  3.8    /  TBili  0.7    /  DBili  x      /  AST  20     /  ALT  14     /  AlkPhos  85     ( 03 Oct 2024 23:07 )    LIVER FUNCTIONS - ( 03 Oct 2024 23:07 )  Alb: 3.8 g/dL / Pro: 7.2 g/dL / ALK PHOS: 85 U/L / ALT: 14 U/L / AST: 20 U/L / GGT: x           PT/INR -  12.3 sec / 1.06 ratio   ( 03 Oct 2024 23:07 )       PTT -  28.5 sec   ( 03 Oct 2024 23:07 )  CAPILLARY BLOOD GLUCOSE        Urinalysis Basic - ( 04 Oct 2024 00:20 )    Color: Dark Yellow / Appearance: Clear / SG: >1.030 / pH: x  Gluc: x / Ketone: Trace mg/dL  / Bili: Negative / Urobili: 1.0 mg/dL   Blood: x / Protein: 100 mg/dL / Nitrite: Negative   Leuk Esterase: Negative / RBC: 1 /HPF / WBC 0 /HPF   Sq Epi: x / Non Sq Epi: 2 /HPF / Bacteria: Negative /HPF           (04 Oct 2024 02:05)      PAST MEDICAL & SURGICAL HISTORY:  Diabetes mellitus  Type 1    Hypertension    Hyperlipidemia    Arthritis    S/P tonsillectomy        Social History:  see above    FAMILY HISTORY:  Family history of myocardial infarction (Father, Sibling, Sibling)    CAD (coronary artery disease) (Father, Sibling, Sibling)          Allergies    shellfish (Other; Rash)  No Known Drug Allergies    Intolerances        ROS as noted in the HPI    MEDICATIONS  (STANDING):  acetaminophen     Tablet .. 975 milliGRAM(s) Oral every 6 hours  atorvastatin 40 milliGRAM(s) Oral at bedtime  dextrose 5%. 1000 milliLiter(s) (100 mL/Hr) IV Continuous <Continuous>  dextrose 5%. 1000 milliLiter(s) (50 mL/Hr) IV Continuous <Continuous>  dextrose 50% Injectable 12.5 Gram(s) IV Push once  dextrose 50% Injectable 25 Gram(s) IV Push once  dextrose 50% Injectable 25 Gram(s) IV Push once  glucagon  Injectable 1 milliGRAM(s) IntraMuscular once  influenza  Vaccine (HIGH DOSE) 0.5 milliLiter(s) IntraMuscular once  insulin glargine Injectable (LANTUS) 20 Unit(s) SubCutaneous at bedtime  insulin lispro (ADMELOG) corrective regimen sliding scale   SubCutaneous every 6 hours  lactated ringers. 1000 milliLiter(s) (100 mL/Hr) IV Continuous <Continuous>  lisinopril 40 milliGRAM(s) Oral daily  metoprolol tartrate 25 milliGRAM(s) Oral every 12 hours  pantoprazole  Injectable 40 milliGRAM(s) IV Push daily  piperacillin/tazobactam IVPB.. 3.375 Gram(s) IV Intermittent every 8 hours    MEDICATIONS  (PRN):  dextrose Oral Gel 15 Gram(s) Oral once PRN Blood Glucose LESS THAN 70 milliGRAM(s)/deciliter  morphine  - Injectable 2 milliGRAM(s) IV Push every 6 hours PRN Moderate Pain (4 - 6)  morphine  - Injectable 4 milliGRAM(s) IV Push every 6 hours PRN Severe Pain (7 - 10)      Vital Signs Last 24 Hrs  T(C): 36.7 (04 Oct 2024 17:00), Max: 37.4 (04 Oct 2024 01:52)  T(F): 98.1 (04 Oct 2024 17:00), Max: 99.4 (04 Oct 2024 01:52)  HR: 79 (04 Oct 2024 17:00) (70 - 91)  BP: 130/65 (04 Oct 2024 17:00) (130/65 - 168/78)  BP(mean): --  RR: 18 (04 Oct 2024 17:00) (17 - 18)  SpO2: 92% (04 Oct 2024 17:00) (92% - 98%)    Parameters below as of 04 Oct 2024 17:00  Patient On (Oxygen Delivery Method): room air      Height (cm): 180.3 (10-03-24 @ 21:34)  Weight (kg): 110.7 (10-03-24 @ 21:34)  BMI (kg/m2): 34.1 (10-03-24 @ 21:34)  BSA (m2): 2.29 (10-03-24 @ 21:34)    Physical Exam:    Constitutional: NAD, well-developed  HEENT: EOMI, no exophalmos  Neck: trachea midline, no thyroid enlargement  Respiratory: CTAB, normal respirations  Cardiovascular: S1 and S2, RRR  Gastrointestinal: BS+, soft, ntnd  Extremities: No peripheral edema  Neurological: AOx3, no focal deficits  Psychiatric: Normal mood and normal affect  Skin: no rashes, no acanthosis    LABS  10-04    139  |  107  |  21.5[H]  ----------------------------<  188[H]  4.3   |  20.0[L]  |  1.15    Ca    8.0[L]      04 Oct 2024 04:50    TPro  6.4[L]  /  Alb  3.5  /  TBili  0.8  /  DBili  x   /  AST  15  /  ALT  12  /  AlkPhos  72  10-04                          9.3    8.14  )-----------( 236      ( 04 Oct 2024 04:50 )             29.8             Ketone - Urine: Negative mg/dL (10-04 @ 13:10)  Ketone - Urine: Trace mg/dL (10-04 @ 00:20)    Alkaline Phosphatase: 72 U/L (10-04-24 @ 04:50)  Albumin: 3.5 g/dL (10-04-24 @ 04:50)  Aspartate Aminotransferase (AST/SGOT): 15 U/L (10-04-24 @ 04:50)  Alanine Aminotransferase (ALT/SGPT): 12 U/L (10-04-24 @ 04:50)  Alkaline Phosphatase: 80 U/L (10-04-24 @ 01:46)  Alkaline Phosphatase: 85 U/L (10-03-24 @ 23:07)  Aspartate Aminotransferase (AST/SGOT): 20 U/L (10-03-24 @ 23:07)  Albumin: 3.8 g/dL (10-03-24 @ 23:07)  Alanine Aminotransferase (ALT/SGPT): 14 U/L (10-03-24 @ 23:07)      Lipase: 19 U/L (10-03-24 @ 23:07)      CAPILLARY BLOOD GLUCOSE      POCT Blood Glucose.: 193 mg/dL (04 Oct 2024 12:26)  POCT Blood Glucose.: 162 mg/dL (04 Oct 2024 08:34)      Imaging     Patient is a 69y old  Male who presents with a chief complaint of cecal mass and perforated appendicitis (04 Oct 2024 12:47)    HPI:   69M obese w/ T2DM, HTN, HLD, CABGx3, prostate removal (for prostatic cancer) presenting with a 2 day hx of lower abd pain with subjective fever, found on CT a/p to have perforated appendicitis and cecal mass  Consult for diabetes mgmt a1c pending    diabetes for about 27 years  on nph 75 units AM and 40 units 3pm, mounjaro 12.5mg weekly, metformin 500mg 1 tab BID  sees Dr. Hoover  uses Gracie     no fhx of diabetes  no smoking/etoh  lives with wife, retired       PAST MEDICAL & SURGICAL HISTORY:  Diabetes mellitus  Type 2      Hypertension      Hyperlipidemia      Arthritis      S/P tonsillectomy      PAST MEDICAL & SURGICAL HISTORY:  Diabetes mellitus  Type 2    Hypertension    Hyperlipidemia    Arthritis    S/P tonsillectomy        Social History:  see above    FAMILY HISTORY:  Family history of myocardial infarction (Father, Sibling, Sibling)    CAD (coronary artery disease) (Father, Sibling, Sibling)          Allergies    shellfish (Other; Rash)  No Known Drug Allergies    Intolerances        ROS as noted in the HPI    MEDICATIONS  (STANDING):  acetaminophen     Tablet .. 975 milliGRAM(s) Oral every 6 hours  atorvastatin 40 milliGRAM(s) Oral at bedtime  dextrose 5%. 1000 milliLiter(s) (100 mL/Hr) IV Continuous <Continuous>  dextrose 5%. 1000 milliLiter(s) (50 mL/Hr) IV Continuous <Continuous>  dextrose 50% Injectable 12.5 Gram(s) IV Push once  dextrose 50% Injectable 25 Gram(s) IV Push once  dextrose 50% Injectable 25 Gram(s) IV Push once  glucagon  Injectable 1 milliGRAM(s) IntraMuscular once  influenza  Vaccine (HIGH DOSE) 0.5 milliLiter(s) IntraMuscular once  insulin glargine Injectable (LANTUS) 20 Unit(s) SubCutaneous at bedtime  insulin lispro (ADMELOG) corrective regimen sliding scale   SubCutaneous every 6 hours  lactated ringers. 1000 milliLiter(s) (100 mL/Hr) IV Continuous <Continuous>  lisinopril 40 milliGRAM(s) Oral daily  metoprolol tartrate 25 milliGRAM(s) Oral every 12 hours  pantoprazole  Injectable 40 milliGRAM(s) IV Push daily  piperacillin/tazobactam IVPB.. 3.375 Gram(s) IV Intermittent every 8 hours    MEDICATIONS  (PRN):  dextrose Oral Gel 15 Gram(s) Oral once PRN Blood Glucose LESS THAN 70 milliGRAM(s)/deciliter  morphine  - Injectable 2 milliGRAM(s) IV Push every 6 hours PRN Moderate Pain (4 - 6)  morphine  - Injectable 4 milliGRAM(s) IV Push every 6 hours PRN Severe Pain (7 - 10)      Vital Signs Last 24 Hrs  T(C): 36.7 (04 Oct 2024 17:00), Max: 37.4 (04 Oct 2024 01:52)  T(F): 98.1 (04 Oct 2024 17:00), Max: 99.4 (04 Oct 2024 01:52)  HR: 79 (04 Oct 2024 17:00) (70 - 91)  BP: 130/65 (04 Oct 2024 17:00) (130/65 - 168/78)  BP(mean): --  RR: 18 (04 Oct 2024 17:00) (17 - 18)  SpO2: 92% (04 Oct 2024 17:00) (92% - 98%)    Parameters below as of 04 Oct 2024 17:00  Patient On (Oxygen Delivery Method): room air      Height (cm): 180.3 (10-03-24 @ 21:34)  Weight (kg): 110.7 (10-03-24 @ 21:34)  BMI (kg/m2): 34.1 (10-03-24 @ 21:34)  BSA (m2): 2.29 (10-03-24 @ 21:34)    Physical Exam:    Constitutional: NAD, obese  Respiratory: CTAB, normal respirations  Cardiovascular: S1 and S2, RRR  Gastrointestinal: BS+, soft, LLQ tenderness to superficial palpation  Extremities: No peripheral edema  Neurological: AOx3, no focal deficits  Psychiatric: Normal mood and normal affect  Skin: no rashes, no acanthosis    LABS  10-04    139  |  107  |  21.5[H]  ----------------------------<  188[H]  4.3   |  20.0[L]  |  1.15    Ca    8.0[L]      04 Oct 2024 04:50    TPro  6.4[L]  /  Alb  3.5  /  TBili  0.8  /  DBili  x   /  AST  15  /  ALT  12  /  AlkPhos  72  10-04                          9.3    8.14  )-----------( 236      ( 04 Oct 2024 04:50 )             29.8             Ketone - Urine: Negative mg/dL (10-04 @ 13:10)  Ketone - Urine: Trace mg/dL (10-04 @ 00:20)    Alkaline Phosphatase: 72 U/L (10-04-24 @ 04:50)  Albumin: 3.5 g/dL (10-04-24 @ 04:50)  Aspartate Aminotransferase (AST/SGOT): 15 U/L (10-04-24 @ 04:50)  Alanine Aminotransferase (ALT/SGPT): 12 U/L (10-04-24 @ 04:50)  Alkaline Phosphatase: 80 U/L (10-04-24 @ 01:46)  Alkaline Phosphatase: 85 U/L (10-03-24 @ 23:07)  Aspartate Aminotransferase (AST/SGOT): 20 U/L (10-03-24 @ 23:07)  Albumin: 3.8 g/dL (10-03-24 @ 23:07)  Alanine Aminotransferase (ALT/SGPT): 14 U/L (10-03-24 @ 23:07)      Lipase: 19 U/L (10-03-24 @ 23:07)      CAPILLARY BLOOD GLUCOSE      POCT Blood Glucose.: 193 mg/dL (04 Oct 2024 12:26)  POCT Blood Glucose.: 162 mg/dL (04 Oct 2024 08:34)      Imaging

## 2024-10-04 NOTE — CONSULT NOTE ADULT - ASSESSMENT
69y male w/ pmh of HTN, HLD, DM, CABG, and prostate removal (for prostatic cancer) who is admitted for CRS for ruptures appendicitis and cecal mass. He is planned for surgery on Monday. Hospital medicine called for medical optimization.           69y male w/ pmh of HTN, HLD, DM, CABG, and prostate removal (for prostatic cancer) who is admitted for CRS for ruptured appendicitis and cecal mass. He is planned for surgery on Monday. Hospital medicine called for medical optimization.     Ruptured appendicitis and cecal mass  CT scan with Cecal mass measuring 5.3 x 5.2 cm, concerning for neoplasm and evidence of perforated appendicitis with  2.8 x 2.6 cm fluid collection   - management per CRS, pain control, abx, IVFs and to remain npo.   - Cards consulted given Cardiac hx, agree with tte.  - RCRI 2 points, class III risk 10.1% risk of 30day Cardiac event. Pending TTE patient is medically optimized for planned procedure.     DM  patient takes Novalin N 70 units in the morning and 50 in the evening, Mounjaro 12.5 weekly and metformin  - Will hold metformin  - Will start Lantus 20 units at bedtime (ordered)  - since he is npo cont ISS q 6 hours (ordered)  - Check a1c in am (ordered)  - Rec Endo consult given high doses of insulin and npo status. (Already consulted)    HTN/HLD  CAD s/p CABG  - If able to take po meds would restart Fenofibrate 160mg once a day and Ezetimibe 10mg once a day.   - If able to take po meds would restart Metoprolol 25mg daily and Ramipril (Therapeutic interchange)  - If unable to take po meds would start prn IV anti-hypertensives  - Cards following  - asa on hold for upcoming procedure.     Left 1st digit wound  patient follows with wound care outpatient and is planned for surgery Oct 30th.   - supportive care    Anemia  - check iron panel, ferritin, b12 and folate  - monitor hgb    DVT ppx: Per CRS    Thank you for this consult Hospital medicine will follow.

## 2024-10-04 NOTE — H&P ADULT - ASSESSMENT
ASSESSMENT: 69y male w/ pmh of HTN, HLD, DM, CABGx3, prostate removal presenting with a two day history of lower abdominal pain , N/V and subjective fever , WBC 11 , CT scan with Cecal mass measuring 5.3 x 5.2 cm, concerning for neoplasm and evidence of perforated appendicitis with  2.8 x 2.6 cm fluid collection     PLAN:    - Admit to CRS   -f/u tumor marker (CEA)   - f/u Chest CT scan   - IV Zosyn  - NPO/IVF  - LUZ   - pain control  - DVT ppx  - OOB/ambulate  - strict I/Os  - Plan discussed with Attending, Dr. Martinez

## 2024-10-04 NOTE — CONSULT NOTE ADULT - SUBJECTIVE AND OBJECTIVE BOX
[place] [facility] - [facility], THE HEART CENTER                                   [address] [place] [address]                                                      PHONE: [phone]                                                         FAX: [phone]  [email or web address]  ---------------------------------------------------------------------------------------------------------------------------------    Reason for Consult: PREOP CV EVAL    HPI:  LILIANA REHMAN is an 69y Male  w/ pmh of  CAD S/P CABG 2015 HTN, HLD, DM, prostate Sx presenting with a two day history of lower abdominal pain     PAST MEDICAL & SURGICAL HISTORY:  Diabetes mellitus  Type 1      Hypertension      Hyperlipidemia      Arthritis      S/P tonsillectomy          shellfish (Other; Rash)  No Known Drug Allergies      MEDICATIONS  (STANDING):  acetaminophen     Tablet .. 975 milliGRAM(s) Oral every 6 hours  aspirin enteric coated 81 milliGRAM(s) Oral daily  atorvastatin 40 milliGRAM(s) Oral at bedtime  dextrose 5%. 1000 milliLiter(s) (100 mL/Hr) IV Continuous <Continuous>  dextrose 5%. 1000 milliLiter(s) (50 mL/Hr) IV Continuous <Continuous>  dextrose 50% Injectable 12.5 Gram(s) IV Push once  dextrose 50% Injectable 25 Gram(s) IV Push once  dextrose 50% Injectable 25 Gram(s) IV Push once  glucagon  Injectable 1 milliGRAM(s) IntraMuscular once  influenza  Vaccine (HIGH DOSE) 0.5 milliLiter(s) IntraMuscular once  insulin lispro (ADMELOG) corrective regimen sliding scale   SubCutaneous at bedtime  insulin lispro (ADMELOG) corrective regimen sliding scale   SubCutaneous three times a day before meals  lactated ringers. 1000 milliLiter(s) (100 mL/Hr) IV Continuous <Continuous>  lisinopril 40 milliGRAM(s) Oral daily  metoprolol tartrate 25 milliGRAM(s) Oral every 12 hours  pantoprazole  Injectable 40 milliGRAM(s) IV Push daily  piperacillin/tazobactam IVPB.. 3.375 Gram(s) IV Intermittent every 8 hours    MEDICATIONS  (PRN):  dextrose Oral Gel 15 Gram(s) Oral once PRN Blood Glucose LESS THAN 70 milliGRAM(s)/deciliter  morphine  - Injectable 4 milliGRAM(s) IV Push every 6 hours PRN Severe Pain (7 - 10)  morphine  - Injectable 2 milliGRAM(s) IV Push every 6 hours PRN Moderate Pain (4 - 6)      Social History:  Cigarettes:                    Alchohol:                 Illicit Drug Abuse:      REVIEW OF SYSTEMS:    Constitutional: No fever, weight loss or fatigue  Eyes: No eye pain, visual disturbances, or discharge  ENMT:  No difficulty hearing, tinnitus, vertigo; No sinus or throat pain  Neck: No pain or stiffness  Respiratory: No cough, wheezing, chills or hemoptysis  Cardiovascular: No chest pain, palpitations, shortness of breath, dizziness or leg swelling  Gastrointestinal: No abdominal or epigastric pain. No nausea, vomiting or hematemesis; No diarrhea or constipation. No melena or hematochezia.  Genitourinary: No dysuria, frequency, hematuria or incontinence  Rectal: No pain, hemorrhoids or incontinence  Neurological: No headaches, memory loss, loss of strength, numbness or tremors  Skin: No itching, burning, rashes or lesions   Lymph Nodes: No enlarged glands  Endocrine: No heat or cold intolerance; No hair loss  Musculoskeletal: No joint pain or swelling; No muscle, back or extremity pain  Psychiatric: No depression, anxiety, mood swings or difficulty sleeping  Heme/Lymph: No easy bruising or bleeding gums  Allergy and Immunologic: No hives or eczema    Vital Signs Last 24 Hrs  T(C): 36.7 (04 Oct 2024 07:23), Max: 37.4 (04 Oct 2024 01:52)  T(F): 98.1 (04 Oct 2024 07:23), Max: 99.4 (04 Oct 2024 01:52)  HR: 76 (04 Oct 2024 07:23) (76 - 91)  BP: 151/68 (04 Oct 2024 07:23) (138/65 - 168/78)  BP(mean): --  RR: 17 (04 Oct 2024 07:23) (17 - 18)  SpO2: 97% (04 Oct 2024 07:23) (95% - 98%)    Parameters below as of 04 Oct 2024 07:23  Patient On (Oxygen Delivery Method): room air      ICU Vital Signs Last 24 Hrs  LILIANA PUCKETTLAN  I&O's Detail    I&O's Summary    Drug Dosing Weight  LILIANA REHMAN      PHYSICAL EXAM:  General: Appears alert and cooperative.  HEENT: Head; normocephalic, atraumatic.  Eyes: Pupils reactive, cornea wnl.  Neck: Supple, no nodes adenopathy, no NVD or carotid bruit or thyromegaly.  CARDIOVASCULAR: Normal S1 and S2, No murmur, rub, gallop or lift.   LUNGS: No rales, rhonchi or wheeze. Normal breath sounds bilaterally.  ABDOMEN: Soft, nontender without mass or organomegaly. bowel sounds normoactive.  EXTREMITIES: No clubbing, cyanosis or edema. Distal pulses wnl.   SKIN: warm and dry with normal turgor.  NEURO: Alert/oriented x 3/normal motor exam. No pathologic reflexes.    PSYCH: normal affect.      >>> <<<  LABS:                        9.3    8.14  )-----------( 236      ( 04 Oct 2024 04:50 )             29.8     10-04    139  |  107  |  21.5[H]  ----------------------------<  188[H]  4.3   |  20.0[L]  |  1.15    Ca    8.0[L]      04 Oct 2024 04:50    TPro  6.4[L]  /  Alb  3.5  /  TBili  0.8  /  DBili  x   /  AST  15  /  ALT  12  /  AlkPhos  72  10-04    LILIANA PUCKETTLAN      PT/INR - ( 03 Oct 2024 23:07 )   PT: 12.3 sec;   INR: 1.06 ratio         PTT - ( 03 Oct 2024 23:07 )  PTT:28.5 sec  Urinalysis Basic - ( 04 Oct 2024 04:50 )    Color: x / Appearance: x / SG: x / pH: x  Gluc: 188 mg/dL / Ketone: x  / Bili: x / Urobili: x   Blood: x / Protein: x / Nitrite: x   Leuk Esterase: x / RBC: x / WBC x   Sq Epi: x / Non Sq Epi: x / Bacteria: x        RADIOLOGY & ADDITIONAL STUDIES:    INTERPRETATION OF TELEMETRY (personally reviewed):    ECG:     ECHO:     STRESS TEST:    CARDIAC CATHETERIZATION:    ACTIVE PROBLEMS:  HEALTH ISSUES - PROBLEM Dx:          Assessment and Plan:    In summary, LILIANA REHMAN is an 69y Male with past medical history significant for     Telemetry monitoring  Serial cardiac markers and EKgs  2DEchocardiogram  Continue present cardiac therapy    ELENO DE LA O MD, FACC, [facility]                 [place] [facility] - [facility], THE HEART CENTER                                   [address] [place] [address]                                                      PHONE: [phone]                                                         FAX: [phone]  [email or web address]  ---------------------------------------------------------------------------------------------------------------------------------    Reason for Consult: PREOP CV EVAL    HPI:      LILIANA REHMAN is an 69y Male  w/ PmHX of CAD S/P CABG 2015 HTN, HLD, DM, prostate Sx presenting with a two day history of lower abdominal pain found with a cecal mass and appendicitis.   Had recently had a Nuclear stress at [place] low risk with NL LVfx  No CP or SOB at this time    PAST MEDICAL & SURGICAL HISTORY:  Diabetes mellitus  Type 1      Hypertension      Hyperlipidemia      Arthritis      S/P tonsillectomy          shellfish (Other; Rash)  No Known Drug Allergies      MEDICATIONS  (STANDING):  acetaminophen     Tablet .. 975 milliGRAM(s) Oral every 6 hours  aspirin enteric coated 81 milliGRAM(s) Oral daily  atorvastatin 40 milliGRAM(s) Oral at bedtime  dextrose 5%. 1000 milliLiter(s) (100 mL/Hr) IV Continuous <Continuous>  dextrose 5%. 1000 milliLiter(s) (50 mL/Hr) IV Continuous <Continuous>  dextrose 50% Injectable 12.5 Gram(s) IV Push once  dextrose 50% Injectable 25 Gram(s) IV Push once  dextrose 50% Injectable 25 Gram(s) IV Push once  glucagon  Injectable 1 milliGRAM(s) IntraMuscular once  influenza  Vaccine (HIGH DOSE) 0.5 milliLiter(s) IntraMuscular once  insulin lispro (ADMELOG) corrective regimen sliding scale   SubCutaneous at bedtime  insulin lispro (ADMELOG) corrective regimen sliding scale   SubCutaneous three times a day before meals  lactated ringers. 1000 milliLiter(s) (100 mL/Hr) IV Continuous <Continuous>  lisinopril 40 milliGRAM(s) Oral daily  metoprolol tartrate 25 milliGRAM(s) Oral every 12 hours  pantoprazole  Injectable 40 milliGRAM(s) IV Push daily  piperacillin/tazobactam IVPB.. 3.375 Gram(s) IV Intermittent every 8 hours    MEDICATIONS  (PRN):  dextrose Oral Gel 15 Gram(s) Oral once PRN Blood Glucose LESS THAN 70 milliGRAM(s)/deciliter  morphine  - Injectable 4 milliGRAM(s) IV Push every 6 hours PRN Severe Pain (7 - 10)  morphine  - Injectable 2 milliGRAM(s) IV Push every 6 hours PRN Moderate Pain (4 - 6)      Social History:  Cigarettes:                    Alchohol:                 Illicit Drug Abuse:      REVIEW OF SYSTEMS:    Constitutional: No fever, weight loss or fatigue  Eyes: No eye pain, visual disturbances, or discharge  ENMT:  No difficulty hearing, tinnitus, vertigo; No sinus or throat pain  Neck: No pain or stiffness  Respiratory: No cough, wheezing, chills or hemoptysis  Cardiovascular: No chest pain, palpitations, shortness of breath, dizziness or leg swelling  Gastrointestinal: No abdominal or epigastric pain. No nausea, vomiting or hematemesis; No diarrhea or constipation. No melena or hematochezia.  Genitourinary: No dysuria, frequency, hematuria or incontinence  Rectal: No pain, hemorrhoids or incontinence  Neurological: No headaches, memory loss, loss of strength, numbness or tremors  Skin: No itching, burning, rashes or lesions   Lymph Nodes: No enlarged glands  Endocrine: No heat or cold intolerance; No hair loss  Musculoskeletal: No joint pain or swelling; No muscle, back or extremity pain  Psychiatric: No depression, anxiety, mood swings or difficulty sleeping  Heme/Lymph: No easy bruising or bleeding gums  Allergy and Immunologic: No hives or eczema    Vital Signs Last 24 Hrs  T(C): 36.7 (04 Oct 2024 07:23), Max: 37.4 (04 Oct 2024 01:52)  T(F): 98.1 (04 Oct 2024 07:23), Max: 99.4 (04 Oct 2024 01:52)  HR: 76 (04 Oct 2024 07:23) (76 - 91)  BP: 151/68 (04 Oct 2024 07:23) (138/65 - 168/78)  BP(mean): --  RR: 17 (04 Oct 2024 07:23) (17 - 18)  SpO2: 97% (04 Oct 2024 07:23) (95% - 98%)    Parameters below as of 04 Oct 2024 07:23  Patient On (Oxygen Delivery Method): room air      ICU Vital Signs Last 24 Hrs  LILIANA ORI  I&O's Detail    I&O's Summary    Drug Dosing Weight  LILIANA REHMAN      PHYSICAL EXAM:  General: Appears alert and cooperative.  HEENT: Head; normocephalic, atraumatic.  Eyes: Pupils reactive, cornea wnl.  Neck: Supple, no nodes adenopathy, no NVD or carotid bruit or thyromegaly.  CARDIOVASCULAR: Normal S1 and S2, No murmur, rub, gallop or lift.   LUNGS: No rales, rhonchi or wheeze. Normal breath sounds bilaterally.  ABDOMEN: Soft, nontender without mass or organomegaly. bowel sounds normoactive.  EXTREMITIES: No clubbing, cyanosis or edema. Distal pulses wnl.   SKIN: warm and dry with normal turgor.  NEURO: Alert/oriented x 3/normal motor exam. No pathologic reflexes.    PSYCH: normal affect.      >>> <<<  LABS:                        9.3    8.14  )-----------( 236      ( 04 Oct 2024 04:50 )             29.8     10-04    139  |  107  |  21.5[H]  ----------------------------<  188[H]  4.3   |  20.0[L]  |  1.15    Ca    8.0[L]      04 Oct 2024 04:50    TPro  6.4[L]  /  Alb  3.5  /  TBili  0.8  /  DBili  x   /  AST  15  /  ALT  12  /  AlkPhos  72  10-04    LILIANA ORI      PT/INR - ( 03 Oct 2024 23:07 )   PT: 12.3 sec;   INR: 1.06 ratio         PTT - ( 03 Oct 2024 23:07 )  PTT:28.5 sec  Urinalysis Basic - ( 04 Oct 2024 04:50 )    Color: x / Appearance: x / SG: x / pH: x  Gluc: 188 mg/dL / Ketone: x  / Bili: x / Urobili: x   Blood: x / Protein: x / Nitrite: x   Leuk Esterase: x / RBC: x / WBC x   Sq Epi: x / Non Sq Epi: x / Bacteria: x        RADIOLOGY & ADDITIONAL STUDIES:    INTERPRETATION OF TELEMETRY (personally reviewed):    ECG:     NSR Septal MI T inv lat    ECHO P        ACTIVE PROBLEMS:  HEALTH ISSUES - PROBLEM Dx:          Assessment and Plan:    In summary,   LILIANA REHMAN is an 69y Male  w/ PmHX of CAD S/P CABG 2015 HTN, HLD, DM, prostate Sx presenting with a two day history of lower abdominal pain found with a cecal mass and appendicitis.   Had recently had a Nuclear stress at [place] low risk with NL LVfx  No CP or SOB at this time  For surgery on Monday     Telemetry monitoring  Will obtain 2DEchocardiogram for LVEF and valvular disease eval last TTE 2015  Hold ASA in preparation for upcoming surgery  Patient appears to be in acceptable risk to proceed with his planned surgery cardiac wise.  If Normal LVEF and no significant valvular disease will clear to proceed  Discussed in detail with patient and family at bedside     ELENO DE LA O MD, FACC, [place]

## 2024-10-04 NOTE — PATIENT PROFILE ADULT - FALL HARM RISK - HARM RISK INTERVENTIONS

## 2024-10-04 NOTE — CONSULT NOTE ADULT - REASON FOR ADMISSION
cecal mass and perforated appendicitis

## 2024-10-04 NOTE — H&P ADULT - HISTORY OF PRESENT ILLNESS
HPI: 69y male w/ pmh of HTN, HLD, DM, CABGx3, prostate removal (for prostatic cancer) presenting with a two day history of lower abdominal pain and subjective fever , on presentation patient complain of lower abdominal pain associated with nausea/ vomitingx2 and diarrhea, reports some recent weight loss last few months , last colonoscopy was 2 years ago (per patient found to have polyps however was benign) , denies family history of colon cancer , denies lightheadedness/dizziness, denies SOB/chest pain,    ROS: 10-system review is otherwise negative except HPI above.      PAST MEDICAL & SURGICAL HISTORY:  Diabetes mellitus  Type 1      Hypertension      Hyperlipidemia      Arthritis      S/P tonsillectomy        FAMILY HISTORY:  Family history of myocardial infarction (Father, Sibling, Sibling)    CAD (coronary artery disease) (Father, Sibling, Sibling)      Family history not pertinent as reviewed with the patient.    SOCIAL HISTORY:  Denies any toxic habits    ALLERGIES: NKA shellfish (Other; Rash)  No Known Drug Allergies        Home Medications:  acetaminophen-oxyCODONE 325 mg-5 mg oral tablet: 1 tab(s) orally every 4 hours, As needed, Moderate Pain (16 Aug 2015 10:21)  Aldactone 25 mg oral tablet: 2 tab(s) orally once a day (16 Aug 2015 10:21)  aspirin buffered 325 mg oral tablet:  orally once a day (16 Aug 2015 10:15)  atorvastatin 40 mg oral tablet: 1 tab(s) orally once a day (at bedtime) (16 Aug 2015 10:15)  Lasix 40 mg oral tablet: 1 tab(s) orally once a day (16 Aug 2015 10:21)  metFORMIN 1000 mg oral tablet: 1 tab(s) orally 2 times a day (with meals) (16 Aug 2015 10:21)  metoprolol tartrate 25 mg oral tablet: 1 tab(s) orally every 12 hours (16 Aug 2015 10:21)  NovoLIN N 100 units/mL subcutaneous suspension: 120 unit(s) subcutaneous 2 times a day (11 Aug 2015 06:05)  ramipril 10 mg oral capsule: 1 cap(s) orally once a day (16 Aug 2015 10:15)      --------------------------------------------------------------------------------------------    PHYSICAL EXAM:   General: NAD, Lying in bed comfortably  Neuro: A+Ox3  HEENT: EOMI, PERRLA, MMM  Cardio: RRR  Resp: Non labored breathing on RA  GI/Abd: Soft, lower abdominal tenderness /ND, no rebound/guarding,   Vascular: All 4 extremities warm and well perfused.   Pelvis: stable  Musculoskeletal: All 4 extremities moving spontaneously, no limitations, no spinal tenderness.  --------------------------------------------------------------------------------------------    LABS                 10.3   11.43  )----------(  274       ( 03 Oct 2024 23:07 )               32.9      138    |  102    |  27.5   ----------------------------<  237        ( 03 Oct 2024 23:07 )  4.5     |  21.0   |  1.30     Ca    8.5        ( 03 Oct 2024 23:07 )    TPro  7.2    /  Alb  3.8    /  TBili  0.7    /  DBili  x      /  AST  20     /  ALT  14     /  AlkPhos  85     ( 03 Oct 2024 23:07 )    LIVER FUNCTIONS - ( 03 Oct 2024 23:07 )  Alb: 3.8 g/dL / Pro: 7.2 g/dL / ALK PHOS: 85 U/L / ALT: 14 U/L / AST: 20 U/L / GGT: x           PT/INR -  12.3 sec / 1.06 ratio   ( 03 Oct 2024 23:07 )       PTT -  28.5 sec   ( 03 Oct 2024 23:07 )  CAPILLARY BLOOD GLUCOSE        Urinalysis Basic - ( 04 Oct 2024 00:20 )    Color: Dark Yellow / Appearance: Clear / SG: >1.030 / pH: x  Gluc: x / Ketone: Trace mg/dL  / Bili: Negative / Urobili: 1.0 mg/dL   Blood: x / Protein: 100 mg/dL / Nitrite: Negative   Leuk Esterase: Negative / RBC: 1 /HPF / WBC 0 /HPF   Sq Epi: x / Non Sq Epi: 2 /HPF / Bacteria: Negative /HPF

## 2024-10-04 NOTE — CONSULT NOTE ADULT - ASSESSMENT
69M obese w/ T2DM, HTN, HLD, CABGx3, prostate removal (for prostatic cancer) presenting with a 2 day hx of lower abd pain with subjective fever, found on CT to have perforated appendicitis  Consult for diabetes mgmt    Uncontrolled T2DM- FS reasonable in the hospital despite high doses of insulin at home  -A1c pending  -home meds:   -check sugars AC and bedtime  -npo  -agree with lantus 20 units QHS  -continue with insulin sliding scale  -check cpeptide and MITCH (doubt type 1 but this is listed in the chart)    Cecal mass with ruptured appendix- surgery pending for Monday, on abx. care per primary team 69M obese w/ T2DM, HTN, HLD, CABGx3, prostate removal (for prostatic cancer) presenting with a 2 day hx of lower abd pain with subjective fever, found on CT to have perforated appendicitis  Consult for diabetes mgmt    Uncontrolled T2DM- FS reasonable in the hospital despite high doses of insulin at home  -A1c pending  -home meds: mounjaro 12.5mg weekly, metformin 500mg 1 tab BID, novolin n 75/40  -check sugars AC and bedtime  -npo  -agree with lantus 20 units QHS  -continue with insulin sliding scale  -check cpeptide and MITCH (doubt type 1 but this is listed in the chart)    Cecal mass, ruptured appendix- surgery pending for Monday, on abx. care per primary team

## 2024-10-04 NOTE — CONSULT NOTE ADULT - TIME BILLING
Time spent reviewing the chart documentation, reviewing labs and imaging studies, evaluating the patient, discussing the plan of care with the Primary Surgical team, Endocrinology Dr. Hart  and documenting.

## 2024-10-04 NOTE — H&P ADULT - ATTENDING COMMENTS
Patient seen and evaluated at bedside in the ER this morning  Patient reports mild improvement in RLQ abdominal pain, he denies N.V, reports bowel function  Abdomen is soft, nondistended, moderately tender over the RLQ, mild tenderness in suprapubic region  A/P: Keep NPO at this time, continue IV antibiotics, medical and cardiac optimization for operative intervention 10/7. Continue with serial abdominal exams, and supportive care at this time. We discussed the plans for surgery (Robot assisted right hemicolectomy, possible open, and all indicated procedures) and also discussed the risks of surgery including bleeding infection, injury to adjacent structures, MI, stroke, prolonged ventilation needs and even death. Patient and wife understood our discussion and will proceed with operative intervention.       IMPRESSION:    Cecal mass measuring 5.3 x 5.2 cm, concerning for neoplasm. Consider nonemergent colonoscopy.    Findings concerning for acute perforated appendicitis with developing abscess. Appendiceal neoplasm not excluded. Surgical consultation and further oncologic workup suggested.    Query pericecal peritoneal nodule. Mildly prominent pericecal lymph nodes.    Additional findings are mentioned above.    Vital Signs Last 24 Hrs  T(C): 36.4 (04 Oct 2024 15:06), Max: 37.4 (04 Oct 2024 01:52)  T(F): 97.6 (04 Oct 2024 15:06), Max: 99.4 (04 Oct 2024 01:52)  HR: 70 (04 Oct 2024 15:06) (70 - 91)  BP: 145/62 (04 Oct 2024 15:06) (135/65 - 168/78)  BP(mean): --  RR: 18 (04 Oct 2024 15:06) (17 - 18)  SpO2: 96% (04 Oct 2024 15:06) (95% - 98%)    Parameters below as of 04 Oct 2024 15:06  Patient On (Oxygen Delivery Method): room air                          9.3    8.14  )-----------( 236      ( 04 Oct 2024 04:50 )             29.8

## 2024-10-05 LAB
A1C WITH ESTIMATED AVERAGE GLUCOSE RESULT: 7.7 % — HIGH (ref 4–5.6)
ANION GAP SERPL CALC-SCNC: 15 MMOL/L — SIGNIFICANT CHANGE UP (ref 5–17)
BASOPHILS # BLD AUTO: 0.06 K/UL — SIGNIFICANT CHANGE UP (ref 0–0.2)
BASOPHILS NFR BLD AUTO: 0.5 % — SIGNIFICANT CHANGE UP (ref 0–2)
BUN SERPL-MCNC: 15.3 MG/DL — SIGNIFICANT CHANGE UP (ref 8–20)
CALCIUM SERPL-MCNC: 8.4 MG/DL — SIGNIFICANT CHANGE UP (ref 8.4–10.5)
CHLORIDE SERPL-SCNC: 104 MMOL/L — SIGNIFICANT CHANGE UP (ref 96–108)
CO2 SERPL-SCNC: 20 MMOL/L — LOW (ref 22–29)
CREAT SERPL-MCNC: 1.27 MG/DL — SIGNIFICANT CHANGE UP (ref 0.5–1.3)
CULTURE RESULTS: NO GROWTH — SIGNIFICANT CHANGE UP
EGFR: 61 ML/MIN/1.73M2 — SIGNIFICANT CHANGE UP
EOSINOPHIL # BLD AUTO: 0.05 K/UL — SIGNIFICANT CHANGE UP (ref 0–0.5)
EOSINOPHIL NFR BLD AUTO: 0.4 % — SIGNIFICANT CHANGE UP (ref 0–6)
ESTIMATED AVERAGE GLUCOSE: 174 MG/DL — HIGH (ref 68–114)
FERRITIN SERPL-MCNC: 82 NG/ML — SIGNIFICANT CHANGE UP (ref 30–400)
FOLATE SERPL-MCNC: 10.4 NG/ML — SIGNIFICANT CHANGE UP
GLUCOSE BLDC GLUCOMTR-MCNC: 145 MG/DL — HIGH (ref 70–99)
GLUCOSE BLDC GLUCOMTR-MCNC: 150 MG/DL — HIGH (ref 70–99)
GLUCOSE BLDC GLUCOMTR-MCNC: 152 MG/DL — HIGH (ref 70–99)
GLUCOSE BLDC GLUCOMTR-MCNC: 166 MG/DL — HIGH (ref 70–99)
GLUCOSE BLDC GLUCOMTR-MCNC: 182 MG/DL — HIGH (ref 70–99)
GLUCOSE BLDC GLUCOMTR-MCNC: 187 MG/DL — HIGH (ref 70–99)
GLUCOSE SERPL-MCNC: 172 MG/DL — HIGH (ref 70–99)
HCT VFR BLD CALC: 32.6 % — LOW (ref 39–50)
HGB BLD-MCNC: 10.1 G/DL — LOW (ref 13–17)
IMM GRANULOCYTES NFR BLD AUTO: 0.3 % — SIGNIFICANT CHANGE UP (ref 0–0.9)
IRON SATN MFR SERPL: 12 UG/DL — LOW (ref 59–158)
IRON SATN MFR SERPL: 3 % — LOW (ref 16–55)
LYMPHOCYTES # BLD AUTO: 1.42 K/UL — SIGNIFICANT CHANGE UP (ref 1–3.3)
LYMPHOCYTES # BLD AUTO: 12.3 % — LOW (ref 13–44)
MAGNESIUM SERPL-MCNC: 1.7 MG/DL — LOW (ref 1.8–2.6)
MCHC RBC-ENTMCNC: 25.1 PG — LOW (ref 27–34)
MCHC RBC-ENTMCNC: 31 GM/DL — LOW (ref 32–36)
MCV RBC AUTO: 81.1 FL — SIGNIFICANT CHANGE UP (ref 80–100)
MONOCYTES # BLD AUTO: 0.61 K/UL — SIGNIFICANT CHANGE UP (ref 0–0.9)
MONOCYTES NFR BLD AUTO: 5.3 % — SIGNIFICANT CHANGE UP (ref 2–14)
NEUTROPHILS # BLD AUTO: 9.33 K/UL — HIGH (ref 1.8–7.4)
NEUTROPHILS NFR BLD AUTO: 81.2 % — HIGH (ref 43–77)
PHOSPHATE SERPL-MCNC: 2.2 MG/DL — LOW (ref 2.4–4.7)
PLATELET # BLD AUTO: 287 K/UL — SIGNIFICANT CHANGE UP (ref 150–400)
POTASSIUM SERPL-MCNC: 4.2 MMOL/L — SIGNIFICANT CHANGE UP (ref 3.5–5.3)
POTASSIUM SERPL-SCNC: 4.2 MMOL/L — SIGNIFICANT CHANGE UP (ref 3.5–5.3)
RBC # BLD: 4.02 M/UL — LOW (ref 4.2–5.8)
RBC # FLD: 15.6 % — HIGH (ref 10.3–14.5)
SODIUM SERPL-SCNC: 139 MMOL/L — SIGNIFICANT CHANGE UP (ref 135–145)
SPECIMEN SOURCE: SIGNIFICANT CHANGE UP
TIBC SERPL-MCNC: 380 UG/DL — SIGNIFICANT CHANGE UP (ref 220–430)
TRANSFERRIN SERPL-MCNC: 266 MG/DL — SIGNIFICANT CHANGE UP (ref 180–329)
VIT B12 SERPL-MCNC: 315 PG/ML — SIGNIFICANT CHANGE UP (ref 232–1245)
WBC # BLD: 11.51 K/UL — HIGH (ref 3.8–10.5)
WBC # FLD AUTO: 11.51 K/UL — HIGH (ref 3.8–10.5)

## 2024-10-05 PROCEDURE — 99232 SBSQ HOSP IP/OBS MODERATE 35: CPT

## 2024-10-05 PROCEDURE — 99231 SBSQ HOSP IP/OBS SF/LOW 25: CPT | Mod: GC

## 2024-10-05 RX ORDER — SODIUM PHOSPHATE IN D5W 15MMOL/250
30 PLASTIC BAG, INJECTION (ML) INTRAVENOUS ONCE
Refills: 0 | Status: COMPLETED | OUTPATIENT
Start: 2024-10-05 | End: 2024-10-05

## 2024-10-05 RX ORDER — MAGNESIUM SULFATE 500 MG/ML
1 VIAL (ML) INJECTION ONCE
Refills: 0 | Status: COMPLETED | OUTPATIENT
Start: 2024-10-05 | End: 2024-10-05

## 2024-10-05 RX ADMIN — PIPERACILLIN SODIUM AND TAZOBACTAM SODIUM 25 GRAM(S): 12; 1.5 INJECTION, POWDER, LYOPHILIZED, FOR SOLUTION INTRAVENOUS at 06:05

## 2024-10-05 RX ADMIN — Medication 40 MILLIGRAM(S): at 06:06

## 2024-10-05 RX ADMIN — Medication 2: at 17:46

## 2024-10-05 RX ADMIN — Medication 100 MILLILITER(S): at 21:18

## 2024-10-05 RX ADMIN — PIPERACILLIN SODIUM AND TAZOBACTAM SODIUM 25 GRAM(S): 12; 1.5 INJECTION, POWDER, LYOPHILIZED, FOR SOLUTION INTRAVENOUS at 13:48

## 2024-10-05 RX ADMIN — Medication 975 MILLIGRAM(S): at 17:46

## 2024-10-05 RX ADMIN — PANTOPRAZOLE SODIUM 40 MILLIGRAM(S): 40 TABLET, DELAYED RELEASE ORAL at 11:47

## 2024-10-05 RX ADMIN — Medication 975 MILLIGRAM(S): at 23:38

## 2024-10-05 RX ADMIN — ENOXAPARIN SODIUM 40 MILLIGRAM(S): 150 INJECTION SUBCUTANEOUS at 06:09

## 2024-10-05 RX ADMIN — Medication 25 MILLIGRAM(S): at 17:46

## 2024-10-05 RX ADMIN — ATORVASTATIN CALCIUM 40 MILLIGRAM(S): 10 TABLET, FILM COATED ORAL at 21:17

## 2024-10-05 RX ADMIN — Medication 100 MILLILITER(S): at 11:46

## 2024-10-05 RX ADMIN — Medication 975 MILLIGRAM(S): at 13:27

## 2024-10-05 RX ADMIN — Medication 85 MILLIMOLE(S): at 11:46

## 2024-10-05 RX ADMIN — Medication 975 MILLIGRAM(S): at 18:12

## 2024-10-05 RX ADMIN — Medication 975 MILLIGRAM(S): at 06:06

## 2024-10-05 RX ADMIN — Medication 100 MILLILITER(S): at 06:05

## 2024-10-05 RX ADMIN — Medication 2: at 11:47

## 2024-10-05 RX ADMIN — PIPERACILLIN SODIUM AND TAZOBACTAM SODIUM 25 GRAM(S): 12; 1.5 INJECTION, POWDER, LYOPHILIZED, FOR SOLUTION INTRAVENOUS at 21:23

## 2024-10-05 RX ADMIN — Medication 100 GRAM(S): at 08:47

## 2024-10-05 RX ADMIN — INSULIN GLARGINE 20 UNIT(S): 300 INJECTION, SOLUTION SUBCUTANEOUS at 21:17

## 2024-10-05 RX ADMIN — Medication 2: at 06:07

## 2024-10-05 RX ADMIN — Medication 975 MILLIGRAM(S): at 00:51

## 2024-10-05 RX ADMIN — Medication 25 MILLIGRAM(S): at 06:07

## 2024-10-05 RX ADMIN — Medication 975 MILLIGRAM(S): at 11:47

## 2024-10-05 NOTE — PROGRESS NOTE ADULT - ASSESSMENT
69y male w/ pmh of HTN, HLD, DM, CABG, and prostate removal (for prostatic cancer) who is admitted for CRS for ruptured appendicitis and cecal mass. He is planned for surgery on Monday. Hospital medicine called for medical optimization.     1-Ruptured appendicitis and cecal mass  CT scan with Cecal mass measuring 5.3 x 5.2 cm, concerning for neoplasm and evidence of perforated appendicitis with  2.8 x 2.6 cm fluid collection   - cont pain control, abx, IVFs and to remain npo per colorectal surgery team    - RCRI 2 points, class III risk 10.1% risk of 30day Cardiac event   - patient is medically optimized for planned procedure.   replace magnesium and phosp     2-DM  patient takes Novalin N 70 units in the morning and 50 in the evening, Mounjaro 12.5 weekly and metformin  cont  Lantus 20 units at bedtime   - since he is npo cont ISS q 6 hours   - cont ISS , NPO   - endocrinology is following     3-HTN/HLD  4-CAD s/p CABG  when able  to take po meds would restart Fenofibrate 160mg once a day and Ezetimibe 10mg once a day.   cont  metoprolol   resume ecotrin daily if jaz by surgery team   - asa on hold for upcoming procedure per surgery ?   cardio follow up appreciated     5-Left 1st digit wound  patient follows with wound care outpatient and is planned for surgery Oct 30th.   - supportive care    6-Anemia  - monitor hgb    DVT ppx: lovenox sq daily       will follow

## 2024-10-05 NOTE — PROGRESS NOTE ADULT - SUBJECTIVE AND OBJECTIVE BOX
Patient is a 69y old  Male admitted for cecal mass and perforated appendicitis         Patient seen and examined at bedside.   Chart reviewed   he is hungry   denies any pain       ALLERGIES:  shellfish (Other; Rash)  No Known Drug Allergies    MEDICATIONS  (STANDING):  acetaminophen     Tablet .. 975 milliGRAM(s) Oral every 6 hours  atorvastatin 40 milliGRAM(s) Oral at bedtime  dextrose 5%. 1000 milliLiter(s) (100 mL/Hr) IV Continuous <Continuous>  dextrose 5%. 1000 milliLiter(s) (50 mL/Hr) IV Continuous <Continuous>  dextrose 50% Injectable 12.5 Gram(s) IV Push once  dextrose 50% Injectable 25 Gram(s) IV Push once  dextrose 50% Injectable 25 Gram(s) IV Push once  enoxaparin Injectable 40 milliGRAM(s) SubCutaneous every 24 hours  glucagon  Injectable 1 milliGRAM(s) IntraMuscular once  influenza  Vaccine (HIGH DOSE) 0.5 milliLiter(s) IntraMuscular once  insulin glargine Injectable (LANTUS) 20 Unit(s) SubCutaneous at bedtime  insulin lispro (ADMELOG) corrective regimen sliding scale   SubCutaneous every 6 hours  lactated ringers. 1000 milliLiter(s) (100 mL/Hr) IV Continuous <Continuous>  lisinopril 40 milliGRAM(s) Oral daily  metoprolol tartrate 25 milliGRAM(s) Oral every 12 hours  pantoprazole  Injectable 40 milliGRAM(s) IV Push daily  piperacillin/tazobactam IVPB.. 3.375 Gram(s) IV Intermittent every 8 hours    MEDICATIONS  (PRN):  dextrose Oral Gel 15 Gram(s) Oral once PRN Blood Glucose LESS THAN 70 milliGRAM(s)/deciliter  morphine  - Injectable 4 milliGRAM(s) IV Push every 6 hours PRN Severe Pain (7 - 10)  morphine  - Injectable 2 milliGRAM(s) IV Push every 6 hours PRN Moderate Pain (4 - 6)    Vital Signs Last 24 Hrs  T(F): 98.3 (05 Oct 2024 12:27), Max: 99.9 (05 Oct 2024 00:20)  HR: 61 (05 Oct 2024 12:27) (61 - 90)  BP: 171/77 (05 Oct 2024 12:27) (130/65 - 171/77)  RR: 18 (05 Oct 2024 12:27) (16 - 18)  SpO2: 95% (05 Oct 2024 12:27) (92% - 96%)  I&O's Summary    04 Oct 2024 07:01  -  05 Oct 2024 07:00  --------------------------------------------------------  IN: 1250 mL / OUT: 1200 mL / NET: 50 mL        PHYSICAL EXAM:  General: awake   Neck: supple , no JVD   Lungs: CTA   Cardio: RRR, S1/S2, No murmur  Abdomen: Soft, Nontender, Nondistended; Bowel sounds present  Extremities: No calf tenderness, No pitting edema  Skin warm , normal color     LABS:                        10.1   11.51 )-----------( 287      ( 05 Oct 2024 05:14 )             32.6     10-05    139  |  104  |  15.3  ----------------------------<  172  4.2   |  20.0  |  1.27    Ca    8.4      05 Oct 2024 05:14  Phos  2.2     10-05  Mg     1.7     10-05    TPro  6.4  /  Alb  3.5  /  TBili  0.8  /  DBili  x   /  AST  15  /  ALT  12  /  AlkPhos  72  10-04      Lipase: 19 U/L (10-03-24 @ 23:07)      PT/INR - ( 03 Oct 2024 23:07 )   PT: 12.3 sec;   INR: 1.06 ratio         PTT - ( 03 Oct 2024 23:07 )  PTT:28.5 sec  Lactate, Blood: 1.3 mmol/L (10-04 @ 01:24)  Lactate, Blood: 2.3 mmol/L (10-03 @ 23:07)                        POCT Blood Glucose.: 182 mg/dL (05 Oct 2024 11:42)  POCT Blood Glucose.: 166 mg/dL (05 Oct 2024 06:02)  POCT Blood Glucose.: 150 mg/dL (05 Oct 2024 00:46)  POCT Blood Glucose.: 156 mg/dL (04 Oct 2024 21:41)  POCT Blood Glucose.: 180 mg/dL (04 Oct 2024 18:14)      Urinalysis Basic - ( 05 Oct 2024 05:14 )    Color: x / Appearance: x / SG: x / pH: x  Gluc: 172 mg/dL / Ketone: x  / Bili: x / Urobili: x   Blood: x / Protein: x / Nitrite: x   Leuk Esterase: x / RBC: x / WBC x   Sq Epi: x / Non Sq Epi: x / Bacteria: x        Culture - Urine (collected 04 Oct 2024 00:20)  Source: Clean Catch Clean Catch (Midstream)  Final Report (05 Oct 2024 06:12):    No growth    Culture - Blood (collected 03 Oct 2024 23:07)  Source: .Blood BLOOD  Preliminary Report (05 Oct 2024 07:01):    No growth at 24 hours        RADIOLOGY & ADDITIONAL TESTS:

## 2024-10-05 NOTE — PROGRESS NOTE ADULT - SUBJECTIVE AND OBJECTIVE BOX
[place] [facility]                                   [address] [place] [address]                                                      PHONE: [phone]                                                         FAX: [phone]  [email or web address]  ---------------------------------------------------------------------------------------------------------------------------------    Reason for Consult: PREOP CV EVAL    HPI:      LILIANA REHMAN is an 69y Male  w/ PmHX of CAD S/P CABG 2015 HTN, HLD, DM, prostate Sx presenting with a two day history of lower abdominal pain found with a cecal mass and appendicitis.   Had recently had a Nuclear stress at [place] low risk with NL LVfx  No CP or SOB at this time  TTE reviewed NL LVfx    PAST MEDICAL & SURGICAL HISTORY:  Diabetes mellitus  Type 1      Hypertension      Hyperlipidemia      Arthritis      S/P tonsillectomy          shellfish (Other; Rash)  No Known Drug Allergies      MEDICATIONS  (STANDING):  acetaminophen     Tablet .. 975 milliGRAM(s) Oral every 6 hours  aspirin enteric coated 81 milliGRAM(s) Oral daily  atorvastatin 40 milliGRAM(s) Oral at bedtime  dextrose 5%. 1000 milliLiter(s) (100 mL/Hr) IV Continuous <Continuous>  dextrose 5%. 1000 milliLiter(s) (50 mL/Hr) IV Continuous <Continuous>  dextrose 50% Injectable 12.5 Gram(s) IV Push once  dextrose 50% Injectable 25 Gram(s) IV Push once  dextrose 50% Injectable 25 Gram(s) IV Push once  glucagon  Injectable 1 milliGRAM(s) IntraMuscular once  influenza  Vaccine (HIGH DOSE) 0.5 milliLiter(s) IntraMuscular once  insulin lispro (ADMELOG) corrective regimen sliding scale   SubCutaneous at bedtime  insulin lispro (ADMELOG) corrective regimen sliding scale   SubCutaneous three times a day before meals  lactated ringers. 1000 milliLiter(s) (100 mL/Hr) IV Continuous <Continuous>  lisinopril 40 milliGRAM(s) Oral daily  metoprolol tartrate 25 milliGRAM(s) Oral every 12 hours  pantoprazole  Injectable 40 milliGRAM(s) IV Push daily  piperacillin/tazobactam IVPB.. 3.375 Gram(s) IV Intermittent every 8 hours    MEDICATIONS  (PRN):  dextrose Oral Gel 15 Gram(s) Oral once PRN Blood Glucose LESS THAN 70 milliGRAM(s)/deciliter  morphine  - Injectable 4 milliGRAM(s) IV Push every 6 hours PRN Severe Pain (7 - 10)  morphine  - Injectable 2 milliGRAM(s) IV Push every 6 hours PRN Moderate Pain (4 - 6)      Social History:  Cigarettes:                    Alchohol:                 Illicit Drug Abuse:      REVIEW OF SYSTEMS:    Constitutional: No fever, weight loss or fatigue  Eyes: No eye pain, visual disturbances, or discharge  ENMT:  No difficulty hearing, tinnitus, vertigo; No sinus or throat pain  Neck: No pain or stiffness  Respiratory: No cough, wheezing, chills or hemoptysis  Cardiovascular: No chest pain, palpitations, shortness of breath, dizziness or leg swelling  Gastrointestinal: No abdominal or epigastric pain. No nausea, vomiting or hematemesis; No diarrhea or constipation. No melena or hematochezia.  Genitourinary: No dysuria, frequency, hematuria or incontinence  Rectal: No pain, hemorrhoids or incontinence  Neurological: No headaches, memory loss, loss of strength, numbness or tremors  Skin: No itching, burning, rashes or lesions   Lymph Nodes: No enlarged glands  Endocrine: No heat or cold intolerance; No hair loss  Musculoskeletal: No joint pain or swelling; No muscle, back or extremity pain  Psychiatric: No depression, anxiety, mood swings or difficulty sleeping  Heme/Lymph: No easy bruising or bleeding gums  Allergy and Immunologic: No hives or eczema    Vital Signs Last 24 Hrs  T(C): 36.7 (04 Oct 2024 07:23), Max: 37.4 (04 Oct 2024 01:52)  T(F): 98.1 (04 Oct 2024 07:23), Max: 99.4 (04 Oct 2024 01:52)  HR: 76 (04 Oct 2024 07:23) (76 - 91)  BP: 151/68 (04 Oct 2024 07:23) (138/65 - 168/78)  BP(mean): --  RR: 17 (04 Oct 2024 07:23) (17 - 18)  SpO2: 97% (04 Oct 2024 07:23) (95% - 98%)    Parameters below as of 04 Oct 2024 07:23  Patient On (Oxygen Delivery Method): room air      ICU Vital Signs Last 24 Hrs  LILIANA REHMAN  I&O's Detail    I&O's Summary    Drug Dosing Weight  LILIANASHANICE PUCKETTLAN      PHYSICAL EXAM:  General: Appears alert and cooperative.  HEENT: Head; normocephalic, atraumatic.  Eyes: Pupils reactive, cornea wnl.  Neck: Supple, no nodes adenopathy, no NVD or carotid bruit or thyromegaly.  CARDIOVASCULAR: Normal S1 and S2, No murmur, rub, gallop or lift.   LUNGS: No rales, rhonchi or wheeze. Normal breath sounds bilaterally.  ABDOMEN: Soft, nontender without mass or organomegaly. bowel sounds normoactive.  EXTREMITIES: No clubbing, cyanosis or edema. Distal pulses wnl.   SKIN: warm and dry with normal turgor.  NEURO: Alert/oriented x 3/normal motor exam. No pathologic reflexes.    PSYCH: normal affect.      >>> <<<  LABS:                        9.3    8.14  )-----------( 236      ( 04 Oct 2024 04:50 )             29.8     10-04    139  |  107  |  21.5[H]  ----------------------------<  188[H]  4.3   |  20.0[L]  |  1.15    Ca    8.0[L]      04 Oct 2024 04:50    TPro  6.4[L]  /  Alb  3.5  /  TBili  0.8  /  DBili  x   /  AST  15  /  ALT  12  /  AlkPhos  72  10-04    LILIANA REHMAN      PT/INR - ( 03 Oct 2024 23:07 )   PT: 12.3 sec;   INR: 1.06 ratio         PTT - ( 03 Oct 2024 23:07 )  PTT:28.5 sec  Urinalysis Basic - ( 04 Oct 2024 04:50 )    Color: x / Appearance: x / SG: x / pH: x  Gluc: 188 mg/dL / Ketone: x  / Bili: x / Urobili: x   Blood: x / Protein: x / Nitrite: x   Leuk Esterase: x / RBC: x / WBC x   Sq Epi: x / Non Sq Epi: x / Bacteria: x        RADIOLOGY & ADDITIONAL STUDIES:    INTERPRETATION OF TELEMETRY (personally reviewed):    ECG:     NSR Septal MI T inv lat    ECHO     < from: TTE W or WO Ultrasound Enhancing Agent (10.04.24 @ 13:02) >   1. Left ventricular systolic function is normal with an ejection fraction of 65 % by Jose's method of disks with an ejection fraction visually estimated at 60 to 65 %.   2. Mild left ventricular hypertrophy.   3. The left ventricular diastolic function is indeterminate.   4. Normal right ventricular cavity size and normal right ventricular systolic function.   5. Pulmonary artery systolic pressure could not be estimated.   6. Left atrium is normal in size.   7. There is mild posterior calcification of the mitral valve annulus.   8. Fibrocalcific aortic valve sclerosis without stenosis.   9. No pericardial effusion seen.  10. No prior echocardiogram is available for comparison.    ________________________________________________________________________________________  FINDINGS:     Left Ventricle:  The left ventricular cavity is normal in size. Left ventricular wall thickness is mildly increased. Left ventricular systolic function is normal with a calculated ejection fraction of 65 % by the Jose's biplane method of disks. Mild left ventricular hypertrophy. There is no evidence of a left ventricular thrombus. The left ventricular diastolic function is indeterminate.     Right Ventricle:  The right ventricular cavity is normal in size and right ventricular systolic function is normal. Tricuspid annular plane systolic excursion (TAPSE) is 1.6 cm (normal >=1.7 cm). Tricuspid annular tissue Doppler S' is 10.9 cm/s (normal >10 cm/s).     Left Atrium:  The left atrium is normal in size with an indexed volume of 29.59 ml/m².     Right Atrium:  The right atrium is normal in size with an indexed volume of 20.19 ml/m².     Interatrial Septum:  The interatrial septum appears intact.     Aortic Valve:  The aortic valve appears trileaflet with normal systolic excursion. There is mild thickening of the aortic valve leaflets. There is fibrocalcific aortic valve sclerosis without stenosis. There is no evidence of aortic regurgitation.     Mitral Valve:  Structurally normal mitral valve with normal leaflet excursion. There is mild posterior calcification of the mitral valve annulus. There is no evidence of mitral regurgitation.     Tricuspid Valve:  Structurally normal tricuspid valve withnormal leaflet excursion. There is no evidence of tricuspid regurgitation. There is insufficient tricuspid regurgitation detected to calculate pulmonary artery systolic pressure.     Pulmonic Valve:  Structurally normal pulmonic valve with normal leaflet excursion. There is trace pulmonic regurgitation.     Pulmonary Artery:  The main pulmonary artery is normal in size, origin, and position.     Aorta:  The aortic root at the sinuses of Valsalva is normal in size, measuring 3.60 cm (indexed 1.59 cm/m²). The aortic diameter at the sinotubular junction is normal in size, measuring 2.9 cm. The ascending aorta is normal in size, measuring 3.50 cm (indexed 1.54 cm/m²).     Pericardium:  No pericardial effusion seen.     Systemic Veins:  The inferior vena cava is normal in size (normal <2.1cm) with normal inspiratory collapse (normal >50%) consistent with normal right atrial pressure (~3, range 0-5mmHg).  ____________________________________________________________________  QUANTITATIVE DATA:  Left Ventricle Measurements: (Indexed to BSA)     IVSd (2D):   1.3 cm  LVPWd (2D):  1.3 cm  LVIDd (2D):  4.5 cm  LVIDs (2D):  3.3 cm  LV Mass:     227 g  100.1 g/m²  LV Vol d, MOD A2C: 139.5 ml 61.53 ml/m²  LV Vol d, MOD A4C: 125.7 ml 55.43 ml/m²  LV Vol d, MOD BP:  133.0 ml 58.67 ml/m²  LV Vol s, MOD A2C: 47.5 ml  20.97 ml/m²  LV Vol s, MOD A4C: 42.7 ml  18.85 ml/m²  LV Vol s, MOD BP:  45.9 ml  20.26 ml/m²  LVOT SV MOD BP:    87.1 ml  LV EF% MOD BP:     65 %     MV E Vmax:    0.70 m/s  MV A Vmax:   1.31 m/s  MV E/A:       0.54    < end of copied text >          ACTIVE PROBLEMS:  HEALTH ISSUES - PROBLEM Dx:          Assessment and Plan:    In summary,     LILIANA REHMAN is an 69y Male  w/ PmHX of CAD S/P CABG 2015 HTN, HLD, DM, prostate Sx presenting with a two day history of lower abdominal pain found with a cecal mass and appendicitis.   Had recently had a Nuclear stress at [place] low risk with NL LVfx  No CP or SOB at this time  For surgery on Monday     Telemetry monitoring  1) Preoperative Optimization       -No Cardiovascular Contraindication to surgery       -Patient is acceptable risk for a  moderate  risk surgery       -Continue cardiac medications as scheduled       -Postoperative Telemetry       -Please recall with any questions or concerns     Discussed in detail with patient and family at bedside     ELENO DE LA O MD, FACC, [place]

## 2024-10-05 NOTE — PROGRESS NOTE ADULT - SUBJECTIVE AND OBJECTIVE BOX
Subjective: Patient seen and examined at bedside this AM, no current complaints, no overnight events. Patient is NPO, ambulating indepenently, voiding freely.       MEDICATIONS  (STANDING):  acetaminophen     Tablet .. 975 milliGRAM(s) Oral every 6 hours  atorvastatin 40 milliGRAM(s) Oral at bedtime  dextrose 5%. 1000 milliLiter(s) (100 mL/Hr) IV Continuous <Continuous>  dextrose 5%. 1000 milliLiter(s) (50 mL/Hr) IV Continuous <Continuous>  dextrose 50% Injectable 12.5 Gram(s) IV Push once  dextrose 50% Injectable 25 Gram(s) IV Push once  dextrose 50% Injectable 25 Gram(s) IV Push once  enoxaparin Injectable 40 milliGRAM(s) SubCutaneous every 24 hours  glucagon  Injectable 1 milliGRAM(s) IntraMuscular once  influenza  Vaccine (HIGH DOSE) 0.5 milliLiter(s) IntraMuscular once  insulin glargine Injectable (LANTUS) 20 Unit(s) SubCutaneous at bedtime  insulin lispro (ADMELOG) corrective regimen sliding scale   SubCutaneous every 6 hours  lactated ringers. 1000 milliLiter(s) (100 mL/Hr) IV Continuous <Continuous>  lisinopril 40 milliGRAM(s) Oral daily  metoprolol tartrate 25 milliGRAM(s) Oral every 12 hours  pantoprazole  Injectable 40 milliGRAM(s) IV Push daily  piperacillin/tazobactam IVPB.. 3.375 Gram(s) IV Intermittent every 8 hours    MEDICATIONS  (PRN):  dextrose Oral Gel 15 Gram(s) Oral once PRN Blood Glucose LESS THAN 70 milliGRAM(s)/deciliter  morphine  - Injectable 4 milliGRAM(s) IV Push every 6 hours PRN Severe Pain (7 - 10)  morphine  - Injectable 2 milliGRAM(s) IV Push every 6 hours PRN Moderate Pain (4 - 6)      Vital Signs Last 24 Hrs  T(C): 37.7 (05 Oct 2024 00:20), Max: 37.7 (05 Oct 2024 00:20)  T(F): 99.9 (05 Oct 2024 00:20), Max: 99.9 (05 Oct 2024 00:20)  HR: 82 (05 Oct 2024 00:20) (70 - 90)  BP: 154/66 (05 Oct 2024 00:20) (130/65 - 154/66)  BP(mean): --  RR: 18 (05 Oct 2024 00:20) (17 - 18)  SpO2: 95% (05 Oct 2024 00:20) (92% - 97%)    Parameters below as of 05 Oct 2024 00:20  Patient On (Oxygen Delivery Method): room air        Physical Exam:    Constitutional: NAD  HEENT: PERRL, EOMI  Respiratory: Respirations non-labored, no accessory muscle use  Gastrointestinal: non-distended  Neurological: A&O x 3      LABS:                        9.3    8.14  )-----------( 236      ( 04 Oct 2024 04:50 )             29.8     10-04    139  |  107  |  21.5[H]  ----------------------------<  188[H]  4.3   |  20.0[L]  |  1.15    Ca    8.0[L]      04 Oct 2024 04:50    TPro  6.4[L]  /  Alb  3.5  /  TBili  0.8  /  DBili  x   /  AST  15  /  ALT  12  /  AlkPhos  72  10-04    PT/INR - ( 03 Oct 2024 23:07 )   PT: 12.3 sec;   INR: 1.06 ratio         PTT - ( 03 Oct 2024 23:07 )  PTT:28.5 sec  Urinalysis Basic - ( 04 Oct 2024 13:10 )    Color: Yellow / Appearance: Clear / SG: >1.030 / pH: x  Gluc: x / Ketone: Negative mg/dL  / Bili: Negative / Urobili: 1.0 mg/dL   Blood: x / Protein: 100 mg/dL / Nitrite: Negative   Leuk Esterase: Negative / RBC: 0 /HPF / WBC 0 /HPF   Sq Epi: x / Non Sq Epi: 0 /HPF / Bacteria: Negative /HPF        A: Patient is a 69 year old male with pmh of HTN, HLD, DM, CABGx3, and prostate removal who was admitted for findings of an cecal mass (5.3x5.2cm) and evidence of perforated appendicitis with a 2.8x2.6cm fluid collection iso abdominal pain. Will plan for robotic right hemicolectomy on 10/7 pending medical clearances.     P:  Pain control  Diet: NPO   IVFs  F/u CEA  F/u Cards recs (TTE, hold ASA)  F/u Med and Endo recs  On tele  Anti-emetics PRN  OOB as tolerated  Encourage IS  DVT PPx: LOV  AM labs/ replete lytes as needed

## 2024-10-06 LAB
ANION GAP SERPL CALC-SCNC: 13 MMOL/L — SIGNIFICANT CHANGE UP (ref 5–17)
BASOPHILS # BLD AUTO: 0.04 K/UL — SIGNIFICANT CHANGE UP (ref 0–0.2)
BASOPHILS NFR BLD AUTO: 0.5 % — SIGNIFICANT CHANGE UP (ref 0–2)
BUN SERPL-MCNC: 13.3 MG/DL — SIGNIFICANT CHANGE UP (ref 8–20)
C PEPTIDE SERPL-MCNC: 0.8 NG/ML — LOW (ref 1.1–4.4)
CALCIUM SERPL-MCNC: 8.8 MG/DL — SIGNIFICANT CHANGE UP (ref 8.4–10.5)
CHLORIDE SERPL-SCNC: 107 MMOL/L — SIGNIFICANT CHANGE UP (ref 96–108)
CO2 SERPL-SCNC: 20 MMOL/L — LOW (ref 22–29)
CREAT SERPL-MCNC: 1.12 MG/DL — SIGNIFICANT CHANGE UP (ref 0.5–1.3)
EGFR: 71 ML/MIN/1.73M2 — SIGNIFICANT CHANGE UP
EOSINOPHIL # BLD AUTO: 0.13 K/UL — SIGNIFICANT CHANGE UP (ref 0–0.5)
EOSINOPHIL NFR BLD AUTO: 1.8 % — SIGNIFICANT CHANGE UP (ref 0–6)
GLUCOSE BLDC GLUCOMTR-MCNC: 126 MG/DL — HIGH (ref 70–99)
GLUCOSE BLDC GLUCOMTR-MCNC: 141 MG/DL — HIGH (ref 70–99)
GLUCOSE BLDC GLUCOMTR-MCNC: 143 MG/DL — HIGH (ref 70–99)
GLUCOSE BLDC GLUCOMTR-MCNC: 155 MG/DL — HIGH (ref 70–99)
GLUCOSE BLDC GLUCOMTR-MCNC: 161 MG/DL — HIGH (ref 70–99)
GLUCOSE SERPL-MCNC: 127 MG/DL — HIGH (ref 70–99)
HCT VFR BLD CALC: 31.7 % — LOW (ref 39–50)
HGB BLD-MCNC: 9.9 G/DL — LOW (ref 13–17)
IMM GRANULOCYTES NFR BLD AUTO: 0.3 % — SIGNIFICANT CHANGE UP (ref 0–0.9)
LYMPHOCYTES # BLD AUTO: 0.87 K/UL — LOW (ref 1–3.3)
LYMPHOCYTES # BLD AUTO: 11.9 % — LOW (ref 13–44)
MAGNESIUM SERPL-MCNC: 2 MG/DL — SIGNIFICANT CHANGE UP (ref 1.6–2.6)
MCHC RBC-ENTMCNC: 25 PG — LOW (ref 27–34)
MCHC RBC-ENTMCNC: 31.2 GM/DL — LOW (ref 32–36)
MCV RBC AUTO: 80.1 FL — SIGNIFICANT CHANGE UP (ref 80–100)
MONOCYTES # BLD AUTO: 0.4 K/UL — SIGNIFICANT CHANGE UP (ref 0–0.9)
MONOCYTES NFR BLD AUTO: 5.4 % — SIGNIFICANT CHANGE UP (ref 2–14)
NEUTROPHILS # BLD AUTO: 5.88 K/UL — SIGNIFICANT CHANGE UP (ref 1.8–7.4)
NEUTROPHILS NFR BLD AUTO: 80.1 % — HIGH (ref 43–77)
PHOSPHATE SERPL-MCNC: 2.4 MG/DL — SIGNIFICANT CHANGE UP (ref 2.4–4.7)
PLATELET # BLD AUTO: 263 K/UL — SIGNIFICANT CHANGE UP (ref 150–400)
POTASSIUM SERPL-MCNC: 3.8 MMOL/L — SIGNIFICANT CHANGE UP (ref 3.5–5.3)
POTASSIUM SERPL-SCNC: 3.8 MMOL/L — SIGNIFICANT CHANGE UP (ref 3.5–5.3)
RBC # BLD: 3.96 M/UL — LOW (ref 4.2–5.8)
RBC # FLD: 15.5 % — HIGH (ref 10.3–14.5)
SODIUM SERPL-SCNC: 140 MMOL/L — SIGNIFICANT CHANGE UP (ref 135–145)
WBC # BLD: 7.34 K/UL — SIGNIFICANT CHANGE UP (ref 3.8–10.5)
WBC # FLD AUTO: 7.34 K/UL — SIGNIFICANT CHANGE UP (ref 3.8–10.5)

## 2024-10-06 PROCEDURE — 99232 SBSQ HOSP IP/OBS MODERATE 35: CPT

## 2024-10-06 RX ORDER — NEOMYCIN SULFATE 125 MG/5ML
1000 SOLUTION, ORAL ORAL
Refills: 0 | Status: COMPLETED | OUTPATIENT
Start: 2024-10-06 | End: 2024-10-06

## 2024-10-06 RX ORDER — AMLODIPINE BESYLATE 5 MG
5 TABLET ORAL DAILY
Refills: 0 | Status: DISCONTINUED | OUTPATIENT
Start: 2024-10-06 | End: 2024-10-07

## 2024-10-06 RX ORDER — POLYETHYLENE GLYCOL-3350, SODIUM CHLORIDE, POTASSIUM CHLORIDE AND SODIUM BICARBONATE 420; 11.2; 5.72; 1.48 G/438.4G; G/438.4G; G/438.4G; G/438.4G
1000 POWDER, FOR SOLUTION ORAL ONCE
Refills: 0 | Status: COMPLETED | OUTPATIENT
Start: 2024-10-06 | End: 2024-10-06

## 2024-10-06 RX ORDER — SOD PHOS DI, MONO/K PHOS MONO 250 MG
2 TABLET ORAL ONCE
Refills: 0 | Status: COMPLETED | OUTPATIENT
Start: 2024-10-06 | End: 2024-10-06

## 2024-10-06 RX ADMIN — Medication 975 MILLIGRAM(S): at 12:40

## 2024-10-06 RX ADMIN — INSULIN GLARGINE 20 UNIT(S): 300 INJECTION, SOLUTION SUBCUTANEOUS at 22:25

## 2024-10-06 RX ADMIN — Medication 1000 MILLIGRAM(S): at 17:14

## 2024-10-06 RX ADMIN — POLYETHYLENE GLYCOL-3350, SODIUM CHLORIDE, POTASSIUM CHLORIDE AND SODIUM BICARBONATE 1000 MILLILITER(S): 420; 11.2; 5.72; 1.48 POWDER, FOR SOLUTION ORAL at 17:15

## 2024-10-06 RX ADMIN — Medication 40 MILLIGRAM(S): at 05:54

## 2024-10-06 RX ADMIN — Medication 2 TABLET(S): at 12:11

## 2024-10-06 RX ADMIN — PANTOPRAZOLE SODIUM 40 MILLIGRAM(S): 40 TABLET, DELAYED RELEASE ORAL at 12:10

## 2024-10-06 RX ADMIN — Medication 975 MILLIGRAM(S): at 17:55

## 2024-10-06 RX ADMIN — PIPERACILLIN SODIUM AND TAZOBACTAM SODIUM 25 GRAM(S): 12; 1.5 INJECTION, POWDER, LYOPHILIZED, FOR SOLUTION INTRAVENOUS at 05:55

## 2024-10-06 RX ADMIN — Medication 975 MILLIGRAM(S): at 12:10

## 2024-10-06 RX ADMIN — PIPERACILLIN SODIUM AND TAZOBACTAM SODIUM 25 GRAM(S): 12; 1.5 INJECTION, POWDER, LYOPHILIZED, FOR SOLUTION INTRAVENOUS at 22:26

## 2024-10-06 RX ADMIN — Medication 975 MILLIGRAM(S): at 06:54

## 2024-10-06 RX ADMIN — Medication 1000 MILLIGRAM(S): at 15:50

## 2024-10-06 RX ADMIN — PIPERACILLIN SODIUM AND TAZOBACTAM SODIUM 25 GRAM(S): 12; 1.5 INJECTION, POWDER, LYOPHILIZED, FOR SOLUTION INTRAVENOUS at 14:49

## 2024-10-06 RX ADMIN — Medication 975 MILLIGRAM(S): at 05:54

## 2024-10-06 RX ADMIN — Medication 1000 MILLIGRAM(S): at 14:49

## 2024-10-06 RX ADMIN — Medication 5 MILLIGRAM(S): at 12:10

## 2024-10-06 RX ADMIN — ATORVASTATIN CALCIUM 40 MILLIGRAM(S): 10 TABLET, FILM COATED ORAL at 22:25

## 2024-10-06 RX ADMIN — Medication 2: at 12:11

## 2024-10-06 RX ADMIN — ENOXAPARIN SODIUM 40 MILLIGRAM(S): 150 INJECTION SUBCUTANEOUS at 05:59

## 2024-10-06 RX ADMIN — Medication 975 MILLIGRAM(S): at 23:43

## 2024-10-06 RX ADMIN — Medication 25 MILLIGRAM(S): at 17:17

## 2024-10-06 RX ADMIN — Medication 975 MILLIGRAM(S): at 00:38

## 2024-10-06 RX ADMIN — Medication 975 MILLIGRAM(S): at 17:16

## 2024-10-06 RX ADMIN — Medication 1000 MILLIGRAM(S): at 15:51

## 2024-10-06 RX ADMIN — Medication 25 MILLIGRAM(S): at 05:54

## 2024-10-06 NOTE — PROGRESS NOTE ADULT - SUBJECTIVE AND OBJECTIVE BOX
Patient seen and examined at bedside. no events overnight, no currently complaints at this time. denies cp/sob n/v.    Vitals:Vital Signs Last 24 Hrs  T(C): 36.7 (06 Oct 2024 04:10), Max: 37.2 (05 Oct 2024 16:33)  T(F): 98.1 (06 Oct 2024 04:10), Max: 98.9 (05 Oct 2024 16:33)  HR: 70 (06 Oct 2024 04:10) (61 - 74)  BP: 175/79 (06 Oct 2024 04:10) (158/72 - 175/80)  BP(mean): --  RR: 18 (06 Oct 2024 04:10) (16 - 18)  SpO2: 93% (06 Oct 2024 04:10) (93% - 95%)    Parameters below as of 06 Oct 2024 04:10  Patient On (Oxygen Delivery Method): room air        Labs:10-05    139  |  104  |  15.3  ----------------------------<  172[H]  4.2   |  20.0[L]  |  1.27    Ca    8.4      05 Oct 2024 05:14  Phos  2.2     10-05  Mg     1.7     10-05                              10.1   11.51 )-----------( 287      ( 05 Oct 2024 05:14 )             32.6       Exam:  Gen: pt lying in bed, alert, in NAD  Resp: unlabored  CVS: RRR  Abd: softND, mildly tender to palpation in RLQ  Ext: moving all extremities spontaneously, sensation intact, pulses 2+ Patient seen and examined at bedside this AM, no current complaints, no overnight events. Patient is NPO, ambulating indepenently, voiding freely.

## 2024-10-06 NOTE — PROGRESS NOTE ADULT - ASSESSMENT
Patient is a 69 year old male admitted for findings of an cecal mass (5.3x5.2cm) and evidence of perforated appendicitis with a 2.8x2.6cm fluid collection iso abdominal pain. Will plan for robotic right hemicolectomy on 10/7     P:  Pain control  Diet: NPO   IVFs  F/u CEA  cardiology for optimization - appreciate recs  Med and Endo appreciate  recs  On tele  Anti-emetics PRN  OOB as tolerated  Encourage IS  DVT PPx: LOV  AM labs/ replete lytes as needed

## 2024-10-06 NOTE — PROGRESS NOTE ADULT - SUBJECTIVE AND OBJECTIVE BOX
[place] [facility] - [facility], [facility]                                   [address] [place] [address]                                                      PHONE: [phone]                                                         FAX: [phone]  [email or web address]  ---------------------------------------------------------------------------------------------------------------------------------    Reason for Consult: PREOP CV EVAL    HPI:      LILIANA REHMAN is an 69y Male  w/ PmHX of CAD S/P CABG 2015 HTN, HLD, DM, prostate Sx presenting with a two day history of lower abdominal pain found with a cecal mass and appendicitis.   Had recently had a Nuclear stress at [facility] low risk with NL LVfx  No CP or SOB at this time  TTE reviewed NL LVfx  BP mildly elevated in the last 2 days     PAST MEDICAL & SURGICAL HISTORY:  Diabetes mellitus  Type 1      Hypertension      Hyperlipidemia      Arthritis      S/P tonsillectomy          shellfish (Other; Rash)  No Known Drug Allergies      MEDICATIONS  (STANDING):  acetaminophen     Tablet .. 975 milliGRAM(s) Oral every 6 hours  aspirin enteric coated 81 milliGRAM(s) Oral daily  atorvastatin 40 milliGRAM(s) Oral at bedtime  dextrose 5%. 1000 milliLiter(s) (100 mL/Hr) IV Continuous <Continuous>  dextrose 5%. 1000 milliLiter(s) (50 mL/Hr) IV Continuous <Continuous>  dextrose 50% Injectable 12.5 Gram(s) IV Push once  dextrose 50% Injectable 25 Gram(s) IV Push once  dextrose 50% Injectable 25 Gram(s) IV Push once  glucagon  Injectable 1 milliGRAM(s) IntraMuscular once  influenza  Vaccine (HIGH DOSE) 0.5 milliLiter(s) IntraMuscular once  insulin lispro (ADMELOG) corrective regimen sliding scale   SubCutaneous at bedtime  insulin lispro (ADMELOG) corrective regimen sliding scale   SubCutaneous three times a day before meals  lactated ringers. 1000 milliLiter(s) (100 mL/Hr) IV Continuous <Continuous>  lisinopril 40 milliGRAM(s) Oral daily  metoprolol tartrate 25 milliGRAM(s) Oral every 12 hours  pantoprazole  Injectable 40 milliGRAM(s) IV Push daily  piperacillin/tazobactam IVPB.. 3.375 Gram(s) IV Intermittent every 8 hours    MEDICATIONS  (PRN):  dextrose Oral Gel 15 Gram(s) Oral once PRN Blood Glucose LESS THAN 70 milliGRAM(s)/deciliter  morphine  - Injectable 4 milliGRAM(s) IV Push every 6 hours PRN Severe Pain (7 - 10)  morphine  - Injectable 2 milliGRAM(s) IV Push every 6 hours PRN Moderate Pain (4 - 6)      Social History:  Cigarettes:                    Alchohol:                 Illicit Drug Abuse:      REVIEW OF SYSTEMS:    Constitutional: No fever, weight loss or fatigue  Eyes: No eye pain, visual disturbances, or discharge  ENMT:  No difficulty hearing, tinnitus, vertigo; No sinus or throat pain  Neck: No pain or stiffness  Respiratory: No cough, wheezing, chills or hemoptysis  Cardiovascular: No chest pain, palpitations, shortness of breath, dizziness or leg swelling  Gastrointestinal: No abdominal or epigastric pain. No nausea, vomiting or hematemesis; No diarrhea or constipation. No melena or hematochezia.  Genitourinary: No dysuria, frequency, hematuria or incontinence  Rectal: No pain, hemorrhoids or incontinence  Neurological: No headaches, memory loss, loss of strength, numbness or tremors  Skin: No itching, burning, rashes or lesions   Lymph Nodes: No enlarged glands  Endocrine: No heat or cold intolerance; No hair loss  Musculoskeletal: No joint pain or swelling; No muscle, back or extremity pain  Psychiatric: No depression, anxiety, mood swings or difficulty sleeping  Heme/Lymph: No easy bruising or bleeding gums  Allergy and Immunologic: No hives or eczema    Vital Signs Last 24 Hrs  T(C): 36.7 (04 Oct 2024 07:23), Max: 37.4 (04 Oct 2024 01:52)  T(F): 98.1 (04 Oct 2024 07:23), Max: 99.4 (04 Oct 2024 01:52)  HR: 76 (04 Oct 2024 07:23) (76 - 91)  BP: 151/68 (04 Oct 2024 07:23) (138/65 - 168/78)  BP(mean): --  RR: 17 (04 Oct 2024 07:23) (17 - 18)  SpO2: 97% (04 Oct 2024 07:23) (95% - 98%)    Parameters below as of 04 Oct 2024 07:23  Patient On (Oxygen Delivery Method): room air      ICU Vital Signs Last 24 Hrs  LILIANA REHMAN  I&O's Detail    I&O's Summary    Drug Dosing Weight  LILIANA ORI      PHYSICAL EXAM:  General: Appears alert and cooperative.  HEENT: Head; normocephalic, atraumatic.  Eyes: Pupils reactive, cornea wnl.  Neck: Supple, no nodes adenopathy, no NVD or carotid bruit or thyromegaly.  CARDIOVASCULAR: Normal S1 and S2, No murmur, rub, gallop or lift.   LUNGS: No rales, rhonchi or wheeze. Normal breath sounds bilaterally.  ABDOMEN: Soft, nontender without mass or organomegaly. bowel sounds normoactive.  EXTREMITIES: No clubbing, cyanosis or edema. Distal pulses wnl.   SKIN: warm and dry with normal turgor.  NEURO: Alert/oriented x 3/normal motor exam. No pathologic reflexes.    PSYCH: normal affect.      >>> <<<  LABS:                        9.3    8.14  )-----------( 236      ( 04 Oct 2024 04:50 )             29.8     10-04    139  |  107  |  21.5[H]  ----------------------------<  188[H]  4.3   |  20.0[L]  |  1.15    Ca    8.0[L]      04 Oct 2024 04:50    TPro  6.4[L]  /  Alb  3.5  /  TBili  0.8  /  DBili  x   /  AST  15  /  ALT  12  /  AlkPhos  72  10-04    LILIANA REHMAN      PT/INR - ( 03 Oct 2024 23:07 )   PT: 12.3 sec;   INR: 1.06 ratio         PTT - ( 03 Oct 2024 23:07 )  PTT:28.5 sec  Urinalysis Basic - ( 04 Oct 2024 04:50 )    Color: x / Appearance: x / SG: x / pH: x  Gluc: 188 mg/dL / Ketone: x  / Bili: x / Urobili: x   Blood: x / Protein: x / Nitrite: x   Leuk Esterase: x / RBC: x / WBC x   Sq Epi: x / Non Sq Epi: x / Bacteria: x        RADIOLOGY & ADDITIONAL STUDIES:    INTERPRETATION OF TELEMETRY (personally reviewed):    ECG:     NSR Septal MI T inv lat    ECHO     < from: TTE W or WO Ultrasound Enhancing Agent (10.04.24 @ 13:02) >   1. Left ventricular systolic function is normal with an ejection fraction of 65 % by Jose's method of disks with an ejection fraction visually estimated at 60 to 65 %.   2. Mild left ventricular hypertrophy.   3. The left ventricular diastolic function is indeterminate.   4. Normal right ventricular cavity size and normal right ventricular systolic function.   5. Pulmonary artery systolic pressure could not be estimated.   6. Left atrium is normal in size.   7. There is mild posterior calcification of the mitral valve annulus.   8. Fibrocalcific aortic valve sclerosis without stenosis.   9. No pericardial effusion seen.  10. No prior echocardiogram is available for comparison.    ________________________________________________________________________________________  FINDINGS:     Left Ventricle:  The left ventricular cavity is normal in size. Left ventricular wall thickness is mildly increased. Left ventricular systolic function is normal with a calculated ejection fraction of 65 % by the Jose's biplane method of disks. Mild left ventricular hypertrophy. There is no evidence of a left ventricular thrombus. The left ventricular diastolic function is indeterminate.     Right Ventricle:  The right ventricular cavity is normal in size and right ventricular systolic function is normal. Tricuspid annular plane systolic excursion (TAPSE) is 1.6 cm (normal >=1.7 cm). Tricuspid annular tissue Doppler S' is 10.9 cm/s (normal >10 cm/s).     Left Atrium:  The left atrium is normal in size with an indexed volume of 29.59 ml/m².     Right Atrium:  The right atrium is normal in size with an indexed volume of 20.19 ml/m².     Interatrial Septum:  The interatrial septum appears intact.     Aortic Valve:  The aortic valve appears trileaflet with normal systolic excursion. There is mild thickening of the aortic valve leaflets. There is fibrocalcific aortic valve sclerosis without stenosis. There is no evidence of aortic regurgitation.     Mitral Valve:  Structurally normal mitral valve with normal leaflet excursion. There is mild posterior calcification of the mitral valve annulus. There is no evidence of mitral regurgitation.     Tricuspid Valve:  Structurally normal tricuspid valve withnormal leaflet excursion. There is no evidence of tricuspid regurgitation. There is insufficient tricuspid regurgitation detected to calculate pulmonary artery systolic pressure.     Pulmonic Valve:  Structurally normal pulmonic valve with normal leaflet excursion. There is trace pulmonic regurgitation.     Pulmonary Artery:  The main pulmonary artery is normal in size, origin, and position.     Aorta:  The aortic root at the sinuses of Valsalva is normal in size, measuring 3.60 cm (indexed 1.59 cm/m²). The aortic diameter at the sinotubular junction is normal in size, measuring 2.9 cm. The ascending aorta is normal in size, measuring 3.50 cm (indexed 1.54 cm/m²).     Pericardium:  No pericardial effusion seen.     Systemic Veins:  The inferior vena cava is normal in size (normal <2.1cm) with normal inspiratory collapse (normal >50%) consistent with normal right atrial pressure (~3, range 0-5mmHg).  ____________________________________________________________________  QUANTITATIVE DATA:  Left Ventricle Measurements: (Indexed to BSA)     IVSd (2D):   1.3 cm  LVPWd (2D):  1.3 cm  LVIDd (2D):  4.5 cm  LVIDs (2D):  3.3 cm  LV Mass:     227 g  100.1 g/m²  LV Vol d, MOD A2C: 139.5 ml 61.53 ml/m²  LV Vol d, MOD A4C: 125.7 ml 55.43 ml/m²  LV Vol d, MOD BP:  133.0 ml 58.67 ml/m²  LV Vol s, MOD A2C: 47.5 ml  20.97 ml/m²  LV Vol s, MOD A4C: 42.7 ml  18.85 ml/m²  LV Vol s, MOD BP:  45.9 ml  20.26 ml/m²  LVOT SV MOD BP:    87.1 ml  LV EF% MOD BP:     65 %     MV E Vmax:    0.70 m/s  MV A Vmax:   1.31 m/s  MV E/A:       0.54    < end of copied text >          ACTIVE PROBLEMS:  HEALTH ISSUES - PROBLEM Dx:          Assessment and Plan:    In summary,     LILIANA REHMAN is an 69y Male  w/ PmHX of CAD S/P CABG 2015 HTN, HLD, DM, prostate Sx presenting with a two day history of lower abdominal pain found with a cecal mass and appendicitis.   Had recently had a Nuclear stress at [facility] low risk with NL LVfx  No CP or SOB at this time  For surgery on Monday     Telemetry monitoring  1) Preoperative Optimization       -No Cardiovascular Contraindication to surgery       -Patient is acceptable risk for a  moderate  risk surgery       -Continue cardiac medications as scheduled will add amlodipine 5 mg po daily for better BP control       -Postoperative Telemetry       -Please recall with any questions or concerns     Discussed in detail with patient and family at bedside     ELENO DE LA O MD, FACC, [place]

## 2024-10-06 NOTE — PROGRESS NOTE ADULT - SUBJECTIVE AND OBJECTIVE BOX
Patient is a 69y old  Male admitted for cecal mass and perforated appendicitis         Patient seen and examined at bedside.   he is feeling well , no abd pain , + flatus   NPO         ALLERGIES:  shellfish (Other; Rash)  No Known Drug Allergies    MEDICATIONS  (STANDING):  acetaminophen     Tablet .. 975 milliGRAM(s) Oral every 6 hours  atorvastatin 40 milliGRAM(s) Oral at bedtime  dextrose 5%. 1000 milliLiter(s) (100 mL/Hr) IV Continuous <Continuous>  dextrose 5%. 1000 milliLiter(s) (50 mL/Hr) IV Continuous <Continuous>  dextrose 50% Injectable 12.5 Gram(s) IV Push once  dextrose 50% Injectable 25 Gram(s) IV Push once  dextrose 50% Injectable 25 Gram(s) IV Push once  enoxaparin Injectable 40 milliGRAM(s) SubCutaneous every 24 hours  glucagon  Injectable 1 milliGRAM(s) IntraMuscular once  influenza  Vaccine (HIGH DOSE) 0.5 milliLiter(s) IntraMuscular once  insulin glargine Injectable (LANTUS) 20 Unit(s) SubCutaneous at bedtime  insulin lispro (ADMELOG) corrective regimen sliding scale   SubCutaneous every 6 hours  lactated ringers. 1000 milliLiter(s) (100 mL/Hr) IV Continuous <Continuous>  lisinopril 40 milliGRAM(s) Oral daily  metoprolol tartrate 25 milliGRAM(s) Oral every 12 hours  pantoprazole  Injectable 40 milliGRAM(s) IV Push daily  piperacillin/tazobactam IVPB.. 3.375 Gram(s) IV Intermittent every 8 hours    MEDICATIONS  (PRN):  dextrose Oral Gel 15 Gram(s) Oral once PRN Blood Glucose LESS THAN 70 milliGRAM(s)/deciliter  morphine  - Injectable 4 milliGRAM(s) IV Push every 6 hours PRN Severe Pain (7 - 10)  morphine  - Injectable 2 milliGRAM(s) IV Push every 6 hours PRN Moderate Pain (4 - 6)      Vital Signs Last 24 Hrs  T(C): 37.1 (06 Oct 2024 07:34), Max: 37.2 (05 Oct 2024 16:33)  T(F): 98.8 (06 Oct 2024 07:34), Max: 98.9 (05 Oct 2024 16:33)  HR: 73 (06 Oct 2024 07:34) (61 - 74)  BP: 172/71 (06 Oct 2024 07:34) (158/72 - 175/80)  BP(mean): --  RR: 18 (06 Oct 2024 07:34) (18 - 18)  SpO2: 93% (06 Oct 2024 04:10) (93% - 95%)        General: awake alert   Lungs: CTA   Cardio: RRR, S1/S2, No murmur  Abdomen: Soft, Nontender, Nondistended; Bowel sounds present  Extremities: No calf tenderness, No pitting edema  Skin warm , normal color     LABS:                        10.1   11.51 )-----------( 287      ( 05 Oct 2024 05:14 )             32.6     10-05    139  |  104  |  15.3  ----------------------------<  172  4.2   |  20.0  |  1.27    Ca    8.4      05 Oct 2024 05:14  Phos  2.2     10-05  Mg     1.7     10-05    TPro  6.4  /  Alb  3.5  /  TBili  0.8  /  DBili  x   /  AST  15  /  ALT  12  /  AlkPhos  72  10-04      Lipase: 19 U/L (10-03-24 @ 23:07)      PT/INR - ( 03 Oct 2024 23:07 )   PT: 12.3 sec;   INR: 1.06 ratio         PTT - ( 03 Oct 2024 23:07 )  PTT:28.5 sec  Lactate, Blood: 1.3 mmol/L (10-04 @ 01:24)  Lactate, Blood: 2.3 mmol/L (10-03 @ 23:07)                  CAPILLARY BLOOD GLUCOSE      POCT Blood Glucose.: 126 mg/dL (06 Oct 2024 05:53)  POCT Blood Glucose.: 145 mg/dL (05 Oct 2024 23:37)  POCT Blood Glucose.: 152 mg/dL (05 Oct 2024 21:16)  POCT Blood Glucose.: 187 mg/dL (05 Oct 2024 17:43)  POCT Blood Glucose.: 182 mg/dL (05 Oct 2024 11:42)

## 2024-10-07 ENCOUNTER — TRANSCRIPTION ENCOUNTER (OUTPATIENT)
Age: 69
End: 2024-10-07

## 2024-10-07 ENCOUNTER — APPOINTMENT (OUTPATIENT)
Dept: COLORECTAL SURGERY | Facility: HOSPITAL | Age: 69
End: 2024-10-07

## 2024-10-07 LAB
ANION GAP SERPL CALC-SCNC: 11 MMOL/L — SIGNIFICANT CHANGE UP (ref 5–17)
ANION GAP SERPL CALC-SCNC: 14 MMOL/L — SIGNIFICANT CHANGE UP (ref 5–17)
APTT BLD: 31 SEC — SIGNIFICANT CHANGE UP (ref 24.5–35.6)
BASOPHILS # BLD AUTO: 0.06 K/UL — SIGNIFICANT CHANGE UP (ref 0–0.2)
BASOPHILS NFR BLD AUTO: 0.8 % — SIGNIFICANT CHANGE UP (ref 0–2)
BLD GP AB SCN SERPL QL: SIGNIFICANT CHANGE UP
BUN SERPL-MCNC: 14 MG/DL — SIGNIFICANT CHANGE UP (ref 8–20)
BUN SERPL-MCNC: 14 MG/DL — SIGNIFICANT CHANGE UP (ref 8–20)
BURR CELLS BLD QL SMEAR: PRESENT — SIGNIFICANT CHANGE UP
CALCIUM SERPL-MCNC: 7.9 MG/DL — LOW (ref 8.4–10.5)
CALCIUM SERPL-MCNC: 8.5 MG/DL — SIGNIFICANT CHANGE UP (ref 8.4–10.5)
CHLORIDE SERPL-SCNC: 104 MMOL/L — SIGNIFICANT CHANGE UP (ref 96–108)
CHLORIDE SERPL-SCNC: 109 MMOL/L — HIGH (ref 96–108)
CO2 SERPL-SCNC: 18 MMOL/L — LOW (ref 22–29)
CO2 SERPL-SCNC: 21 MMOL/L — LOW (ref 22–29)
CREAT SERPL-MCNC: 1.17 MG/DL — SIGNIFICANT CHANGE UP (ref 0.5–1.3)
CREAT SERPL-MCNC: 1.18 MG/DL — SIGNIFICANT CHANGE UP (ref 0.5–1.3)
DACRYOCYTES BLD QL SMEAR: SLIGHT — SIGNIFICANT CHANGE UP
EGFR: 66 ML/MIN/1.73M2 — SIGNIFICANT CHANGE UP
EGFR: 67 ML/MIN/1.73M2 — SIGNIFICANT CHANGE UP
EOSINOPHIL # BLD AUTO: 0 K/UL — SIGNIFICANT CHANGE UP (ref 0–0.5)
EOSINOPHIL NFR BLD AUTO: 0 % — SIGNIFICANT CHANGE UP (ref 0–6)
GLUCOSE BLDC GLUCOMTR-MCNC: 115 MG/DL — HIGH (ref 70–99)
GLUCOSE BLDC GLUCOMTR-MCNC: 116 MG/DL — HIGH (ref 70–99)
GLUCOSE BLDC GLUCOMTR-MCNC: 125 MG/DL — HIGH (ref 70–99)
GLUCOSE BLDC GLUCOMTR-MCNC: 156 MG/DL — HIGH (ref 70–99)
GLUCOSE BLDC GLUCOMTR-MCNC: 217 MG/DL — HIGH (ref 70–99)
GLUCOSE SERPL-MCNC: 113 MG/DL — HIGH (ref 70–99)
GLUCOSE SERPL-MCNC: 205 MG/DL — HIGH (ref 70–99)
HCT VFR BLD CALC: 28 % — LOW (ref 39–50)
HCT VFR BLD CALC: 28.4 % — LOW (ref 39–50)
HGB BLD-MCNC: 8.7 G/DL — LOW (ref 13–17)
HGB BLD-MCNC: 8.8 G/DL — LOW (ref 13–17)
INR BLD: 1.12 RATIO — SIGNIFICANT CHANGE UP (ref 0.85–1.16)
LYMPHOCYTES # BLD AUTO: 0.07 K/UL — LOW (ref 1–3.3)
LYMPHOCYTES # BLD AUTO: 0.9 % — LOW (ref 13–44)
MAGNESIUM SERPL-MCNC: 1.7 MG/DL — SIGNIFICANT CHANGE UP (ref 1.6–2.6)
MAGNESIUM SERPL-MCNC: 2 MG/DL — SIGNIFICANT CHANGE UP (ref 1.6–2.6)
MANUAL SMEAR VERIFICATION: SIGNIFICANT CHANGE UP
MCHC RBC-ENTMCNC: 24.9 PG — LOW (ref 27–34)
MCHC RBC-ENTMCNC: 24.9 PG — LOW (ref 27–34)
MCHC RBC-ENTMCNC: 31 GM/DL — LOW (ref 32–36)
MCHC RBC-ENTMCNC: 31.1 GM/DL — LOW (ref 32–36)
MCV RBC AUTO: 80 FL — SIGNIFICANT CHANGE UP (ref 80–100)
MCV RBC AUTO: 80.2 FL — SIGNIFICANT CHANGE UP (ref 80–100)
MONOCYTES # BLD AUTO: 0.07 K/UL — SIGNIFICANT CHANGE UP (ref 0–0.9)
MONOCYTES NFR BLD AUTO: 0.9 % — LOW (ref 2–14)
NEUTROPHILS # BLD AUTO: 7.2 K/UL — SIGNIFICANT CHANGE UP (ref 1.8–7.4)
NEUTROPHILS NFR BLD AUTO: 97.4 % — HIGH (ref 43–77)
PHOSPHATE SERPL-MCNC: 2.2 MG/DL — LOW (ref 2.4–4.7)
PHOSPHATE SERPL-MCNC: 3.6 MG/DL — SIGNIFICANT CHANGE UP (ref 2.4–4.7)
PLAT MORPH BLD: NORMAL — SIGNIFICANT CHANGE UP
PLATELET # BLD AUTO: 279 K/UL — SIGNIFICANT CHANGE UP (ref 150–400)
PLATELET # BLD AUTO: 298 K/UL — SIGNIFICANT CHANGE UP (ref 150–400)
POIKILOCYTOSIS BLD QL AUTO: SIGNIFICANT CHANGE UP
POLYCHROMASIA BLD QL SMEAR: SLIGHT — SIGNIFICANT CHANGE UP
POTASSIUM SERPL-MCNC: 4 MMOL/L — SIGNIFICANT CHANGE UP (ref 3.5–5.3)
POTASSIUM SERPL-MCNC: 4.3 MMOL/L — SIGNIFICANT CHANGE UP (ref 3.5–5.3)
POTASSIUM SERPL-SCNC: 4 MMOL/L — SIGNIFICANT CHANGE UP (ref 3.5–5.3)
POTASSIUM SERPL-SCNC: 4.3 MMOL/L — SIGNIFICANT CHANGE UP (ref 3.5–5.3)
PROTHROM AB SERPL-ACNC: 13 SEC — SIGNIFICANT CHANGE UP (ref 9.9–13.4)
RBC # BLD: 3.5 M/UL — LOW (ref 4.2–5.8)
RBC # BLD: 3.54 M/UL — LOW (ref 4.2–5.8)
RBC # FLD: 15.4 % — HIGH (ref 10.3–14.5)
RBC # FLD: 15.5 % — HIGH (ref 10.3–14.5)
RBC BLD AUTO: ABNORMAL
SCHISTOCYTES BLD QL AUTO: SLIGHT — SIGNIFICANT CHANGE UP
SODIUM SERPL-SCNC: 136 MMOL/L — SIGNIFICANT CHANGE UP (ref 135–145)
SODIUM SERPL-SCNC: 141 MMOL/L — SIGNIFICANT CHANGE UP (ref 135–145)
WBC # BLD: 5.23 K/UL — SIGNIFICANT CHANGE UP (ref 3.8–10.5)
WBC # BLD: 7.39 K/UL — SIGNIFICANT CHANGE UP (ref 3.8–10.5)
WBC # FLD AUTO: 5.23 K/UL — SIGNIFICANT CHANGE UP (ref 3.8–10.5)
WBC # FLD AUTO: 7.39 K/UL — SIGNIFICANT CHANGE UP (ref 3.8–10.5)

## 2024-10-07 PROCEDURE — 44160 REMOVAL OF COLON: CPT

## 2024-10-07 PROCEDURE — 99232 SBSQ HOSP IP/OBS MODERATE 35: CPT

## 2024-10-07 PROCEDURE — 99233 SBSQ HOSP IP/OBS HIGH 50: CPT

## 2024-10-07 DEVICE — STAPLER COVIDIEN TRI-STAPLE 60MM TAN RELOAD: Type: IMPLANTABLE DEVICE | Site: RIGHT | Status: FUNCTIONAL

## 2024-10-07 DEVICE — CLIP APPLIER COVIDIEN SURGICLIP 13" LARGE: Type: IMPLANTABLE DEVICE | Site: RIGHT | Status: FUNCTIONAL

## 2024-10-07 DEVICE — SURGICEL 2 X 14": Type: IMPLANTABLE DEVICE | Site: RIGHT | Status: FUNCTIONAL

## 2024-10-07 DEVICE — STAPLER COVIDIEN TRI-STAPLE 60MM PURPLE RELOAD: Type: IMPLANTABLE DEVICE | Site: RIGHT | Status: FUNCTIONAL

## 2024-10-07 RX ORDER — INSULIN LISPRO 100/ML
4 VIAL (ML) SUBCUTANEOUS ONCE
Refills: 0 | Status: COMPLETED | OUTPATIENT
Start: 2024-10-07 | End: 2024-10-07

## 2024-10-07 RX ORDER — INSULIN LISPRO 100/ML
VIAL (ML) SUBCUTANEOUS EVERY 6 HOURS
Refills: 0 | Status: DISCONTINUED | OUTPATIENT
Start: 2024-10-07 | End: 2024-10-09

## 2024-10-07 RX ORDER — SODIUM PHOSPHATE IN D5W 15MMOL/250
15 PLASTIC BAG, INJECTION (ML) INTRAVENOUS ONCE
Refills: 0 | Status: DISCONTINUED | OUTPATIENT
Start: 2024-10-07 | End: 2024-10-07

## 2024-10-07 RX ORDER — ONDANSETRON HCL/PF 4 MG/2 ML
4 VIAL (ML) INJECTION ONCE
Refills: 0 | Status: DISCONTINUED | OUTPATIENT
Start: 2024-10-07 | End: 2024-10-08

## 2024-10-07 RX ORDER — OXYCODONE HYDROCHLORIDE 30 MG/1
5 TABLET, FILM COATED, EXTENDED RELEASE ORAL EVERY 4 HOURS
Refills: 0 | Status: DISCONTINUED | OUTPATIENT
Start: 2024-10-07 | End: 2024-10-11

## 2024-10-07 RX ORDER — ALCOHOL ANTISEPTIC PADS
15 PADS, MEDICATED (EA) TOPICAL ONCE
Refills: 0 | Status: DISCONTINUED | OUTPATIENT
Start: 2024-10-07 | End: 2024-10-09

## 2024-10-07 RX ORDER — SODIUM CHLORIDE IRRIG SOLUTION 0.9 %
1000 SOLUTION, IRRIGATION IRRIGATION
Refills: 0 | Status: DISCONTINUED | OUTPATIENT
Start: 2024-10-07 | End: 2024-10-11

## 2024-10-07 RX ORDER — ALCOHOL ANTISEPTIC PADS
12.5 PADS, MEDICATED (EA) TOPICAL ONCE
Refills: 0 | Status: DISCONTINUED | OUTPATIENT
Start: 2024-10-07 | End: 2024-10-11

## 2024-10-07 RX ORDER — ENOXAPARIN SODIUM 150 MG/ML
40 INJECTION SUBCUTANEOUS EVERY 24 HOURS
Refills: 0 | Status: DISCONTINUED | OUTPATIENT
Start: 2024-10-07 | End: 2024-10-07

## 2024-10-07 RX ORDER — AMLODIPINE BESYLATE 5 MG
5 TABLET ORAL DAILY
Refills: 0 | Status: DISCONTINUED | OUTPATIENT
Start: 2024-10-07 | End: 2024-10-07

## 2024-10-07 RX ORDER — GLUCAGON INJECTION, SOLUTION 0.5 MG/.1ML
1 INJECTION, SOLUTION SUBCUTANEOUS ONCE
Refills: 0 | Status: DISCONTINUED | OUTPATIENT
Start: 2024-10-07 | End: 2024-10-11

## 2024-10-07 RX ORDER — PANTOPRAZOLE SODIUM 40 MG/1
40 TABLET, DELAYED RELEASE ORAL DAILY
Refills: 0 | Status: DISCONTINUED | OUTPATIENT
Start: 2024-10-07 | End: 2024-10-11

## 2024-10-07 RX ORDER — ATORVASTATIN CALCIUM 10 MG/1
40 TABLET, FILM COATED ORAL AT BEDTIME
Refills: 0 | Status: DISCONTINUED | OUTPATIENT
Start: 2024-10-07 | End: 2024-10-11

## 2024-10-07 RX ORDER — SODIUM PHOSPHATE IN D5W 15MMOL/250
15 PLASTIC BAG, INJECTION (ML) INTRAVENOUS ONCE
Refills: 0 | Status: COMPLETED | OUTPATIENT
Start: 2024-10-07 | End: 2024-10-07

## 2024-10-07 RX ORDER — SOD PHOS DI, MONO/K PHOS MONO 250 MG
1 TABLET ORAL ONCE
Refills: 0 | Status: COMPLETED | OUTPATIENT
Start: 2024-10-07 | End: 2024-10-07

## 2024-10-07 RX ORDER — METOPROLOL TARTRATE 50 MG
25 TABLET ORAL EVERY 12 HOURS
Refills: 0 | Status: DISCONTINUED | OUTPATIENT
Start: 2024-10-07 | End: 2024-10-11

## 2024-10-07 RX ORDER — OXYCODONE HYDROCHLORIDE 30 MG/1
10 TABLET, FILM COATED, EXTENDED RELEASE ORAL EVERY 4 HOURS
Refills: 0 | Status: DISCONTINUED | OUTPATIENT
Start: 2024-10-07 | End: 2024-10-11

## 2024-10-07 RX ORDER — ALCOHOL ANTISEPTIC PADS
25 PADS, MEDICATED (EA) TOPICAL ONCE
Refills: 0 | Status: DISCONTINUED | OUTPATIENT
Start: 2024-10-07 | End: 2024-10-11

## 2024-10-07 RX ORDER — FENTANYL CITRATE-0.9 % NACL/PF 300MCG/30
30 PATIENT CONTROLLED ANALGESIA VIAL INJECTION
Refills: 0 | Status: DISCONTINUED | OUTPATIENT
Start: 2024-10-07 | End: 2024-10-07

## 2024-10-07 RX ORDER — FENTANYL CITRATE-0.9 % NACL/PF 300MCG/30
25 PATIENT CONTROLLED ANALGESIA VIAL INJECTION
Refills: 0 | Status: DISCONTINUED | OUTPATIENT
Start: 2024-10-07 | End: 2024-10-07

## 2024-10-07 RX ORDER — PIPERACILLIN SODIUM AND TAZOBACTAM SODIUM 12; 1.5 G/60ML; G/60ML
3.38 INJECTION, POWDER, LYOPHILIZED, FOR SOLUTION INTRAVENOUS EVERY 8 HOURS
Refills: 0 | Status: DISCONTINUED | OUTPATIENT
Start: 2024-10-07 | End: 2024-10-11

## 2024-10-07 RX ORDER — ACETAMINOPHEN 325 MG
1000 TABLET ORAL EVERY 6 HOURS
Refills: 0 | Status: COMPLETED | OUTPATIENT
Start: 2024-10-07 | End: 2024-10-08

## 2024-10-07 RX ADMIN — PIPERACILLIN SODIUM AND TAZOBACTAM SODIUM 25 GRAM(S): 12; 1.5 INJECTION, POWDER, LYOPHILIZED, FOR SOLUTION INTRAVENOUS at 21:58

## 2024-10-07 RX ADMIN — Medication 1 PACKET(S): at 06:10

## 2024-10-07 RX ADMIN — PANTOPRAZOLE SODIUM 40 MILLIGRAM(S): 40 TABLET, DELAYED RELEASE ORAL at 13:40

## 2024-10-07 RX ADMIN — Medication 25 MICROGRAM(S): at 23:10

## 2024-10-07 RX ADMIN — PIPERACILLIN SODIUM AND TAZOBACTAM SODIUM 25 GRAM(S): 12; 1.5 INJECTION, POWDER, LYOPHILIZED, FOR SOLUTION INTRAVENOUS at 13:41

## 2024-10-07 RX ADMIN — Medication 975 MILLIGRAM(S): at 05:22

## 2024-10-07 RX ADMIN — Medication 5 MILLIGRAM(S): at 04:37

## 2024-10-07 RX ADMIN — Medication 25 MICROGRAM(S): at 23:28

## 2024-10-07 RX ADMIN — Medication 975 MILLIGRAM(S): at 05:51

## 2024-10-07 RX ADMIN — Medication 25 MICROGRAM(S): at 22:56

## 2024-10-07 RX ADMIN — Medication 25 MILLIGRAM(S): at 04:36

## 2024-10-07 RX ADMIN — Medication 25 MICROGRAM(S): at 23:42

## 2024-10-07 RX ADMIN — Medication 25 MICROGRAM(S): at 23:04

## 2024-10-07 RX ADMIN — Medication 40 MILLIGRAM(S): at 04:36

## 2024-10-07 RX ADMIN — Medication 25 MICROGRAM(S): at 23:53

## 2024-10-07 RX ADMIN — Medication 100 MILLILITER(S): at 21:55

## 2024-10-07 RX ADMIN — Medication 975 MILLIGRAM(S): at 13:40

## 2024-10-07 RX ADMIN — Medication 2: at 01:14

## 2024-10-07 RX ADMIN — Medication 975 MILLIGRAM(S): at 00:11

## 2024-10-07 RX ADMIN — Medication 63.75 MILLIMOLE(S): at 13:29

## 2024-10-07 RX ADMIN — PIPERACILLIN SODIUM AND TAZOBACTAM SODIUM 25 GRAM(S): 12; 1.5 INJECTION, POWDER, LYOPHILIZED, FOR SOLUTION INTRAVENOUS at 05:22

## 2024-10-07 RX ADMIN — Medication 4 UNIT(S): at 21:55

## 2024-10-07 NOTE — BRIEF OPERATIVE NOTE - OPERATION/FINDINGS
diagnostic laparoscopy performed, evidence of omental implants noted. TI and cecum densely adherent to small bowel. Converted to exploratory laparotomy. Purulent collection discovered in conglomerate of mass and small bowel. Culture sent from this collection. Proximal portion of specimen isolated and transected with 60mm purple staple load. R colon mobilized with difficulty given poorly identifiable planes. Cecum, ascending colon, and proximal transverse colon mobilized. Distal porton of specimen isolated and transected with 60mm purple staple load. R segment of middle colic artery transected. Mesentery taken with ligasure impact up to the iliocolic artery. IC artery taken with 45 tan staple load. high pressure venous bleeding encountered, vessel oversewn and bleeding controlled with ligasure, and large clips. Additional portion of devitalized small bowel resected, approx 20cm in total. anastomosis created in end to end, antiperistaltic fashion.

## 2024-10-07 NOTE — BRIEF OPERATIVE NOTE - NSICDXBRIEFPROCEDURE_GEN_ALL_CORE_FT
PROCEDURES:  Diagnostic laparoscopy 07-Oct-2024 21:42:19  Stephanie So  Open right hemicolectomy 07-Oct-2024 21:43:06  Stephanie So

## 2024-10-07 NOTE — PROGRESS NOTE ADULT - ASSESSMENT
69y male w/ pmh of HTN, HLD, DM, CABG, and prostate removal (for prostatic cancer) who is admitted for CRS for ruptured appendicitis and cecal mass. He is planned for surgery on Monday. Hospital medicine consulted     1-Ruptured appendicitis and cecal mass  - cont pain control, abx, IVFs   -diet per surgery CL today   -NPO after MN for OR in am    - RCRI 2 points, class III risk 10.1% risk of 30day Cardiac event   - patient is medically optimized for planned procedure.   replace phosp     repeat lytes in am     2-DM type 2   patient takes Novalin N 70 units in the morning and 50 in the evening, Mounjaro 12.5 weekly and metformin  cont  Lantus 20 units at bedtime   - cont ISS     3-HTN/HLD  resumed oral home meds , amlodipine and lisinopril     monitor BP and adjust meds as needed     4-CAD s/p CABG   Ezetimibe 10mg once a day.   cont  metoprolol   resume ecotrin daily if jaz by surgery team   cardio follow up appreciated     5-Left 1st digit wound  patient follows with wound care outpatient and is planned for surgery Oct 30th.   - supportive care    6-Anemia  stable   check iron studies and vit b12   IF HB < 8 conider blood tx given his cardiac status       DVT ppx: lovenox on hold for OR   resume post up          will follow as needed

## 2024-10-07 NOTE — BRIEF OPERATIVE NOTE - NSICDXBRIEFPREOP_GEN_ALL_CORE_FT
PRE-OP DIAGNOSIS:  Perforated appendicitis 07-Oct-2024 21:45:24  Stephanie So  Colonic mass 07-Oct-2024 21:44:50  Stephanie So

## 2024-10-07 NOTE — BRIEF OPERATIVE NOTE - NSICDXBRIEFPOSTOP_GEN_ALL_CORE_FT
POST-OP DIAGNOSIS:  Colonic mass 07-Oct-2024 21:45:33  Stephanie So  Perforated appendicitis 07-Oct-2024 21:45:30  Stephanie So

## 2024-10-07 NOTE — PROGRESS NOTE ADULT - ASSESSMENT
Mr. Reynolds is admitted with a perforated appendix and a cecal mass. He was given Mobi prep and has been having bowel movements every 30 mins overnight that became clear this morning - He is scheduled for right hemicolectomy this afternoon. A: Patient is a 69 year old male admitted for findings of an cecal mass (5.3x5.2cm) and evidence of perforated appendicitis with a 2.8 x 2.6cm fluid collection iso abdominal pain. Will go to OR for robotic right hemicolectomy today 10/7.     P:   Pain control  Diet: NPO prior to procedure  IVFs  F/u CEA  cardiology c/s for optimization - patient optimized, rec post-op tele, Cont cardiac meds and added amlodipine PO 5mg daily  Med and Endo appreciate recs  On tele  Anti-emetics PRN  OOB as tolerated  Encourage IS  DVT PPx: LOV  AM labs/ replete lytes as needed

## 2024-10-07 NOTE — PROGRESS NOTE ADULT - SUBJECTIVE AND OBJECTIVE BOX
SUBJECTIVE / 24H EVENTS:    Pt seen and examined at bedside by the team during rounds. Mr. Reynolds is admitted with a perforated appendix and a cecal mass. He was given Mobi prep and has been having bowel movements every 30 mins overnight that became clear this morning. He states that his abdominal pain has improved since being treated. He is scheduled for his right hemicolectomy in the afternoon.  Pt denies CP, SOB, N/V, fever, chills.     MEDICATIONS  (STANDING):  acetaminophen     Tablet .. 975 milliGRAM(s) Oral every 6 hours  amLODIPine   Tablet 5 milliGRAM(s) Oral daily  atorvastatin 40 milliGRAM(s) Oral at bedtime  dextrose 5%. 1000 milliLiter(s) (100 mL/Hr) IV Continuous <Continuous>  dextrose 5%. 1000 milliLiter(s) (50 mL/Hr) IV Continuous <Continuous>  dextrose 50% Injectable 12.5 Gram(s) IV Push once  dextrose 50% Injectable 25 Gram(s) IV Push once  dextrose 50% Injectable 25 Gram(s) IV Push once  enoxaparin Injectable 40 milliGRAM(s) SubCutaneous every 24 hours  glucagon  Injectable 1 milliGRAM(s) IntraMuscular once  influenza  Vaccine (HIGH DOSE) 0.5 milliLiter(s) IntraMuscular once  insulin glargine Injectable (LANTUS) 20 Unit(s) SubCutaneous at bedtime  insulin lispro (ADMELOG) corrective regimen sliding scale   SubCutaneous every 6 hours  lactated ringers. 1000 milliLiter(s) (100 mL/Hr) IV Continuous <Continuous>  lisinopril 40 milliGRAM(s) Oral daily  metoprolol tartrate 25 milliGRAM(s) Oral every 12 hours  pantoprazole  Injectable 40 milliGRAM(s) IV Push daily  piperacillin/tazobactam IVPB.. 3.375 Gram(s) IV Intermittent every 8 hours    MEDICATIONS  (PRN):  dextrose Oral Gel 15 Gram(s) Oral once PRN Blood Glucose LESS THAN 70 milliGRAM(s)/deciliter  morphine  - Injectable 4 milliGRAM(s) IV Push every 6 hours PRN Severe Pain (7 - 10)  morphine  - Injectable 2 milliGRAM(s) IV Push every 6 hours PRN Moderate Pain (4 - 6)      Vital Signs Last 24 Hrs  T(C): 36.8 (07 Oct 2024 04:27), Max: 37.1 (06 Oct 2024 07:34)  T(F): 98.2 (07 Oct 2024 04:27), Max: 98.8 (06 Oct 2024 07:34)  HR: 66 (07 Oct 2024 06:17) (64 - 77)  BP: 166/72 (07 Oct 2024 06:17) (152/67 - 177/79)  BP(mean): --  RR: 18 (07 Oct 2024 04:27) (18 - 18)  SpO2: 96% (07 Oct 2024 04:27) (95% - 98%)    Parameters below as of 07 Oct 2024 04:27  Patient On (Oxygen Delivery Method): room air        Physical Exam  Constitutional: patient appears comfortable resting in bed, in no apparent distress  Respiratory: respirations are unlabored, no accessory muscle use, no conversational dyspnea  Cardiovascular: regular rate & rhythm  Gastrointestinal: abdomen is soft & non-distended, tenderness upon light palpation in the RLQ, no rebound tenderness / guarding  Neurological: GCS15, A&O x 3  Skin: mucous membranes moist, no diaphoresis, pallor, cyanosis or jaundice      I&O's Detail    06 Oct 2024 07:01  -  07 Oct 2024 07:00  --------------------------------------------------------  IN:    Lactated Ringers: 1200 mL    Oral Fluid: 460 mL  Total IN: 1660 mL    OUT:    Voided (mL): 350 mL  Total OUT: 350 mL    Total NET: 1310 mL          LABS:                        8.7    5.23  )-----------( 279      ( 07 Oct 2024 04:41 )             28.0     10-07    141  |  109[H]  |  14.0  ----------------------------<  113[H]  4.0   |  21.0[L]  |  1.17    Ca    8.5      07 Oct 2024 04:41  Phos  2.2     10-07  Mg     2.0     10-07      PT/INR - ( 07 Oct 2024 04:41 )   PT: 13.0 sec;   INR: 1.12 ratio         PTT - ( 07 Oct 2024 04:41 )  PTT:31.0 sec  Urinalysis Basic - ( 07 Oct 2024 04:41 )    Color: x / Appearance: x / SG: x / pH: x  Gluc: 113 mg/dL / Ketone: x  / Bili: x / Urobili: x   Blood: x / Protein: x / Nitrite: x   Leuk Esterase: x / RBC: x / WBC x   Sq Epi: x / Non Sq Epi: x / Bacteria: x       SUBJECTIVE / 24H EVENTS:    Pt seen and examined at bedside by the team during rounds. Mr. Reynolds is admitted with a perforated appendix and a cecal mass. He was given Mobi prep and has been having bowel movements every 30 mins overnight that became clear this morning. He states that his abdominal pain has improved since being treated.  BP was elevated overnight at 177/79 He is scheduled for his right hemicolectomy in the afternoon.  Pt denies CP, SOB, N/V, fever, chills.     Medical History: DM, htn, HLD, Arthritis, prostate cancer  Surg Hx: prostatectomy, 3 vessel CABG 2015, tonsillectomy    MEDICATIONS  (STANDING):  acetaminophen     Tablet .. 975 milliGRAM(s) Oral every 6 hours  amLODIPine   Tablet 5 milliGRAM(s) Oral daily  atorvastatin 40 milliGRAM(s) Oral at bedtime  dextrose 5%. 1000 milliLiter(s) (100 mL/Hr) IV Continuous <Continuous>  dextrose 5%. 1000 milliLiter(s) (50 mL/Hr) IV Continuous <Continuous>  dextrose 50% Injectable 12.5 Gram(s) IV Push once  dextrose 50% Injectable 25 Gram(s) IV Push once  dextrose 50% Injectable 25 Gram(s) IV Push once  enoxaparin Injectable 40 milliGRAM(s) SubCutaneous every 24 hours  glucagon  Injectable 1 milliGRAM(s) IntraMuscular once  influenza  Vaccine (HIGH DOSE) 0.5 milliLiter(s) IntraMuscular once  insulin glargine Injectable (LANTUS) 20 Unit(s) SubCutaneous at bedtime  insulin lispro (ADMELOG) corrective regimen sliding scale   SubCutaneous every 6 hours  lactated ringers. 1000 milliLiter(s) (100 mL/Hr) IV Continuous <Continuous>  lisinopril 40 milliGRAM(s) Oral daily  metoprolol tartrate 25 milliGRAM(s) Oral every 12 hours  pantoprazole  Injectable 40 milliGRAM(s) IV Push daily  piperacillin/tazobactam IVPB.. 3.375 Gram(s) IV Intermittent every 8 hours    MEDICATIONS  (PRN):  dextrose Oral Gel 15 Gram(s) Oral once PRN Blood Glucose LESS THAN 70 milliGRAM(s)/deciliter  morphine  - Injectable 4 milliGRAM(s) IV Push every 6 hours PRN Severe Pain (7 - 10)  morphine  - Injectable 2 milliGRAM(s) IV Push every 6 hours PRN Moderate Pain (4 - 6)    Fam History:  MI father + 2 siblings  CAD father + 2 siblings      Vital Signs Last 24 Hrs  T(C): 36.8 (07 Oct 2024 04:27), Max: 37.1 (06 Oct 2024 07:34)  T(F): 98.2 (07 Oct 2024 04:27), Max: 98.8 (06 Oct 2024 07:34)  HR: 66 (07 Oct 2024 06:17) (64 - 77)  BP: 166/72 (07 Oct 2024 06:17) (152/67 - 177/79)  BP(mean): --  RR: 18 (07 Oct 2024 04:27) (18 - 18)  SpO2: 96% (07 Oct 2024 04:27) (95% - 98%)    Parameters below as of 07 Oct 2024 04:27  Patient On (Oxygen Delivery Method): room air        Physical Exam  Constitutional: patient appears comfortable resting in bed, in no apparent distress  Respiratory: respirations are unlabored, no accessory muscle use, no conversational dyspnea  Cardiovascular: regular rate & rhythm  Gastrointestinal: abdomen is soft & non-distended, tenderness upon light palpation in the RLQ, no rebound tenderness / guarding  Neurological: GCS15, A&O x 3  Skin: mucous membranes moist, no diaphoresis, pallor, cyanosis or jaundice      I&O's Detail    06 Oct 2024 07:01  -  07 Oct 2024 07:00  --------------------------------------------------------  IN:    Lactated Ringers: 1200 mL    Oral Fluid: 460 mL  Total IN: 1660 mL    OUT:    Voided (mL): 350 mL  Total OUT: 350 mL    Total NET: 1310 mL          LABS:                        8.7    5.23  )-----------( 279      ( 07 Oct 2024 04:41 )             28.0     10-07    141  |  109[H]  |  14.0  ----------------------------<  113[H]  4.0   |  21.0[L]  |  1.17    Ca    8.5      07 Oct 2024 04:41  Phos  2.2     10-07  Mg     2.0     10-07      PT/INR - ( 07 Oct 2024 04:41 )   PT: 13.0 sec;   INR: 1.12 ratio         PTT - ( 07 Oct 2024 04:41 )  PTT:31.0 sec  Urinalysis Basic - ( 07 Oct 2024 04:41 )    Color: x / Appearance: x / SG: x / pH: x  Gluc: 113 mg/dL / Ketone: x  / Bili: x / Urobili: x   Blood: x / Protein: x / Nitrite: x   Leuk Esterase: x / RBC: x / WBC x   Sq Epi: x / Non Sq Epi: x / Bacteria: x       SUBJECTIVE / 24H EVENTS:    Pt seen and examined at bedside by the team during rounds. Mr. Reynolds is admitted with a perforated appendix and a cecal mass. He was given Mobi prep and has been having bowel movements every 30 mins overnight that became clear this morning. He states that his abdominal pain has improved since being treated.  BP was elevated overnight at 177/79 He is scheduled for his right hemicolectomy in the afternoon.  Pt denies CP, SOB, N/V, fever, chills.     Medical History: DM, htn, HLD, Arthritis, prostate cancer  Surg Hx: prostatectomy, 3 vessel CABG 2015, tonsillectomy    Heathcare Maintenance: Last colonoscopy was 2 years ago    MEDICATIONS  (STANDING):  acetaminophen     Tablet .. 975 milliGRAM(s) Oral every 6 hours  amLODIPine   Tablet 5 milliGRAM(s) Oral daily  atorvastatin 40 milliGRAM(s) Oral at bedtime  dextrose 5%. 1000 milliLiter(s) (100 mL/Hr) IV Continuous <Continuous>  dextrose 5%. 1000 milliLiter(s) (50 mL/Hr) IV Continuous <Continuous>  dextrose 50% Injectable 12.5 Gram(s) IV Push once  dextrose 50% Injectable 25 Gram(s) IV Push once  dextrose 50% Injectable 25 Gram(s) IV Push once  enoxaparin Injectable 40 milliGRAM(s) SubCutaneous every 24 hours  glucagon  Injectable 1 milliGRAM(s) IntraMuscular once  influenza  Vaccine (HIGH DOSE) 0.5 milliLiter(s) IntraMuscular once  insulin glargine Injectable (LANTUS) 20 Unit(s) SubCutaneous at bedtime  insulin lispro (ADMELOG) corrective regimen sliding scale   SubCutaneous every 6 hours  lactated ringers. 1000 milliLiter(s) (100 mL/Hr) IV Continuous <Continuous>  lisinopril 40 milliGRAM(s) Oral daily  metoprolol tartrate 25 milliGRAM(s) Oral every 12 hours  pantoprazole  Injectable 40 milliGRAM(s) IV Push daily  piperacillin/tazobactam IVPB.. 3.375 Gram(s) IV Intermittent every 8 hours    MEDICATIONS  (PRN):  dextrose Oral Gel 15 Gram(s) Oral once PRN Blood Glucose LESS THAN 70 milliGRAM(s)/deciliter  morphine  - Injectable 4 milliGRAM(s) IV Push every 6 hours PRN Severe Pain (7 - 10)  morphine  - Injectable 2 milliGRAM(s) IV Push every 6 hours PRN Moderate Pain (4 - 6)    Fam History:  MI father + 2 siblings  CAD father + 2 siblings      Vital Signs Last 24 Hrs  T(C): 36.8 (07 Oct 2024 04:27), Max: 37.1 (06 Oct 2024 07:34)  T(F): 98.2 (07 Oct 2024 04:27), Max: 98.8 (06 Oct 2024 07:34)  HR: 66 (07 Oct 2024 06:17) (64 - 77)  BP: 166/72 (07 Oct 2024 06:17) (152/67 - 177/79)  BP(mean): --  RR: 18 (07 Oct 2024 04:27) (18 - 18)  SpO2: 96% (07 Oct 2024 04:27) (95% - 98%)    Parameters below as of 07 Oct 2024 04:27  Patient On (Oxygen Delivery Method): room air        Physical Exam  Constitutional: patient appears comfortable resting in bed, in no apparent distress  Respiratory: respirations are unlabored, no accessory muscle use, no conversational dyspnea  Cardiovascular: regular rate & rhythm  Gastrointestinal: abdomen is soft & non-distended, tenderness upon light palpation in the RLQ, no rebound tenderness / guarding  Neurological: GCS15, A&O x 3  Skin: mucous membranes moist, no diaphoresis, pallor, cyanosis or jaundice      I&O's Detail    06 Oct 2024 07:01  -  07 Oct 2024 07:00  --------------------------------------------------------  IN:    Lactated Ringers: 1200 mL    Oral Fluid: 460 mL  Total IN: 1660 mL    OUT:    Voided (mL): 350 mL  Total OUT: 350 mL    Total NET: 1310 mL          LABS:    CEA: 25.4 ug                        8.7    5.23  )-----------( 279      ( 07 Oct 2024 04:41 )             28.0     10-07    141  |  109[H]  |  14.0  ----------------------------<  113[H]  4.0   |  21.0[L]  |  1.17    Ca    8.5      07 Oct 2024 04:41  Phos  2.2     10-07  Mg     2.0     10-07      PT/INR - ( 07 Oct 2024 04:41 )   PT: 13.0 sec;   INR: 1.12 ratio         PTT - ( 07 Oct 2024 04:41 )  PTT:31.0 sec  Urinalysis Basic - ( 07 Oct 2024 04:41 )    Color: x / Appearance: x / SG: x / pH: x  Gluc: 113 mg/dL / Ketone: x  / Bili: x / Urobili: x   Blood: x / Protein: x / Nitrite: x   Leuk Esterase: x / RBC: x / WBC x   Sq Epi: x / Non Sq Epi: x / Bacteria: x       SUBJECTIVE / 24H EVENTS:    Pt seen and examined at bedside this AM. He was given Mobi prep and has been having bowel movements every 30 mins overnight that became clear this morning. He states that his abdominal pain has improved since being treated.  BP was elevated overnight at 177/79 He is scheduled for a RA right hemicolectomy today 10/7.  Pt denies CP, SOB, N/V, fever, chills.     Medical History: DM, htn, HLD, Arthritis, prostate cancer  Surg Hx: prostatectomy, 3 vessel CABG 2015, tonsillectomy    Heathcare Maintenance: Last colonoscopy was 2 years ago    MEDICATIONS  (STANDING):  acetaminophen     Tablet .. 975 milliGRAM(s) Oral every 6 hours  amLODIPine   Tablet 5 milliGRAM(s) Oral daily  atorvastatin 40 milliGRAM(s) Oral at bedtime  dextrose 5%. 1000 milliLiter(s) (100 mL/Hr) IV Continuous <Continuous>  dextrose 5%. 1000 milliLiter(s) (50 mL/Hr) IV Continuous <Continuous>  dextrose 50% Injectable 12.5 Gram(s) IV Push once  dextrose 50% Injectable 25 Gram(s) IV Push once  dextrose 50% Injectable 25 Gram(s) IV Push once  enoxaparin Injectable 40 milliGRAM(s) SubCutaneous every 24 hours  glucagon  Injectable 1 milliGRAM(s) IntraMuscular once  influenza  Vaccine (HIGH DOSE) 0.5 milliLiter(s) IntraMuscular once  insulin glargine Injectable (LANTUS) 20 Unit(s) SubCutaneous at bedtime  insulin lispro (ADMELOG) corrective regimen sliding scale   SubCutaneous every 6 hours  lactated ringers. 1000 milliLiter(s) (100 mL/Hr) IV Continuous <Continuous>  lisinopril 40 milliGRAM(s) Oral daily  metoprolol tartrate 25 milliGRAM(s) Oral every 12 hours  pantoprazole  Injectable 40 milliGRAM(s) IV Push daily  piperacillin/tazobactam IVPB.. 3.375 Gram(s) IV Intermittent every 8 hours    MEDICATIONS  (PRN):  dextrose Oral Gel 15 Gram(s) Oral once PRN Blood Glucose LESS THAN 70 milliGRAM(s)/deciliter  morphine  - Injectable 4 milliGRAM(s) IV Push every 6 hours PRN Severe Pain (7 - 10)  morphine  - Injectable 2 milliGRAM(s) IV Push every 6 hours PRN Moderate Pain (4 - 6)    Fam History:  MI father + 2 siblings  CAD father + 2 siblings      Vital Signs Last 24 Hrs  T(C): 36.8 (07 Oct 2024 04:27), Max: 37.1 (06 Oct 2024 07:34)  T(F): 98.2 (07 Oct 2024 04:27), Max: 98.8 (06 Oct 2024 07:34)  HR: 66 (07 Oct 2024 06:17) (64 - 77)  BP: 166/72 (07 Oct 2024 06:17) (152/67 - 177/79)  BP(mean): --  RR: 18 (07 Oct 2024 04:27) (18 - 18)  SpO2: 96% (07 Oct 2024 04:27) (95% - 98%)    Parameters below as of 07 Oct 2024 04:27  Patient On (Oxygen Delivery Method): room air        Physical Exam  Constitutional: patient appears comfortable resting in bed, in no apparent distress  Respiratory: respirations are unlabored, no accessory muscle use, no conversational dyspnea  Cardiovascular: regular rate & rhythm  Gastrointestinal: abdomen is soft & non-distended, tenderness upon light palpation in the RLQ, no rebound tenderness / guarding  Neurological: GCS15, A&O x 3  Skin: mucous membranes moist, no diaphoresis, pallor, cyanosis or jaundice      I&O's Detail    06 Oct 2024 07:01  -  07 Oct 2024 07:00  --------------------------------------------------------  IN:    Lactated Ringers: 1200 mL    Oral Fluid: 460 mL  Total IN: 1660 mL    OUT:    Voided (mL): 350 mL  Total OUT: 350 mL    Total NET: 1310 mL          LABS:    CEA: 25.4 ug                        8.7    5.23  )-----------( 279      ( 07 Oct 2024 04:41 )             28.0     10-07    141  |  109[H]  |  14.0  ----------------------------<  113[H]  4.0   |  21.0[L]  |  1.17    Ca    8.5      07 Oct 2024 04:41  Phos  2.2     10-07  Mg     2.0     10-07      PT/INR - ( 07 Oct 2024 04:41 )   PT: 13.0 sec;   INR: 1.12 ratio         PTT - ( 07 Oct 2024 04:41 )  PTT:31.0 sec  Urinalysis Basic - ( 07 Oct 2024 04:41 )    Color: x / Appearance: x / SG: x / pH: x  Gluc: 113 mg/dL / Ketone: x  / Bili: x / Urobili: x   Blood: x / Protein: x / Nitrite: x   Leuk Esterase: x / RBC: x / WBC x   Sq Epi: x / Non Sq Epi: x / Bacteria: x

## 2024-10-07 NOTE — POST DISCHARGE NOTE - NOTIFICATION:
Patient's CT scan reflected on the Radiology Incidental Findings report for today. Patient is already scheduled for surgery inpatient for findings.

## 2024-10-07 NOTE — PROGRESS NOTE ADULT - SUBJECTIVE AND OBJECTIVE BOX
INTERVAL HPI/OVERNIGHT EVENTS:    MEDICATIONS  (STANDING):  acetaminophen     Tablet .. 975 milliGRAM(s) Oral every 6 hours  amLODIPine   Tablet 5 milliGRAM(s) Oral daily  atorvastatin 40 milliGRAM(s) Oral at bedtime  dextrose 5%. 1000 milliLiter(s) (100 mL/Hr) IV Continuous <Continuous>  dextrose 5%. 1000 milliLiter(s) (50 mL/Hr) IV Continuous <Continuous>  dextrose 50% Injectable 12.5 Gram(s) IV Push once  dextrose 50% Injectable 25 Gram(s) IV Push once  dextrose 50% Injectable 25 Gram(s) IV Push once  enoxaparin Injectable 40 milliGRAM(s) SubCutaneous every 24 hours  glucagon  Injectable 1 milliGRAM(s) IntraMuscular once  influenza  Vaccine (HIGH DOSE) 0.5 milliLiter(s) IntraMuscular once  insulin glargine Injectable (LANTUS) 20 Unit(s) SubCutaneous at bedtime  insulin lispro (ADMELOG) corrective regimen sliding scale   SubCutaneous every 6 hours  lactated ringers. 1000 milliLiter(s) (100 mL/Hr) IV Continuous <Continuous>  lisinopril 40 milliGRAM(s) Oral daily  metoprolol tartrate 25 milliGRAM(s) Oral every 12 hours  pantoprazole  Injectable 40 milliGRAM(s) IV Push daily  piperacillin/tazobactam IVPB.. 3.375 Gram(s) IV Intermittent every 8 hours  sodium phosphate 15 milliMole(s)/250 mL IVPB 15 milliMole(s) IV Intermittent once  sodium phosphate 15 milliMole(s)/250 mL IVPB 15 milliMole(s) IV Intermittent once    MEDICATIONS  (PRN):  dextrose Oral Gel 15 Gram(s) Oral once PRN Blood Glucose LESS THAN 70 milliGRAM(s)/deciliter  morphine  - Injectable 2 milliGRAM(s) IV Push every 6 hours PRN Moderate Pain (4 - 6)  morphine  - Injectable 4 milliGRAM(s) IV Push every 6 hours PRN Severe Pain (7 - 10)      Allergies    shellfish (Other; Rash)  No Known Drug Allergies            Review of system          MEDICATIONS  (STANDING):  acetaminophen     Tablet .. 975 milliGRAM(s) Oral every 6 hours  atorvastatin 40 milliGRAM(s) Oral at bedtime  dextrose 5%. 1000 milliLiter(s) (100 mL/Hr) IV Continuous <Continuous>  dextrose 5%. 1000 milliLiter(s) (50 mL/Hr) IV Continuous <Continuous>  dextrose 50% Injectable 12.5 Gram(s) IV Push once  dextrose 50% Injectable 25 Gram(s) IV Push once  dextrose 50% Injectable 25 Gram(s) IV Push once  glucagon  Injectable 1 milliGRAM(s) IntraMuscular once  influenza  Vaccine (HIGH DOSE) 0.5 milliLiter(s) IntraMuscular once  insulin glargine Injectable (LANTUS) 20 Unit(s) SubCutaneous at bedtime  insulin lispro (ADMELOG) corrective regimen sliding scale   SubCutaneous every 6 hours  lactated ringers. 1000 milliLiter(s) (100 mL/Hr) IV Continuous <Continuous>  lisinopril 40 milliGRAM(s) Oral daily  metoprolol tartrate 25 milliGRAM(s) Oral every 12 hours  pantoprazole  Injectable 40 milliGRAM(s) IV Push daily  piperacillin/tazobactam IVPB.. 3.375 Gram(s) IV Intermittent every 8 hours    MEDICATIONS  (PRN):  dextrose Oral Gel 15 Gram(s) Oral once PRN Blood Glucose LESS THAN 70 milliGRAM(s)/deciliter  morphine  - Injectable 2 milliGRAM(s) IV Push every 6 hours PRN Moderate Pain (4 - 6)  morphine  - Injectable 4 milliGRAM(s) IV Push every 6 hours PRN Severe Pain (7 - 10)      Vital Signs Last 24 Hrs  T(C): 36.7 (04 Oct 2024 17:00), Max: 37.4 (04 Oct 2024 01:52)  T(F): 98.1 (04 Oct 2024 17:00), Max: 99.4 (04 Oct 2024 01:52)  HR: 79 (04 Oct 2024 17:00) (70 - 91)  BP: 130/65 (04 Oct 2024 17:00) (130/65 - 168/78)  BP(mean): --  RR: 18 (04 Oct 2024 17:00) (17 - 18)  SpO2: 92% (04 Oct 2024 17:00) (92% - 98%)    Parameters below as of 04 Oct 2024 17:00  Patient On (Oxygen Delivery Method): room air      Height (cm): 180.3 (10-03-24 @ 21:34)  Weight (kg): 110.7 (10-03-24 @ 21:34)  BMI (kg/m2): 34.1 (10-03-24 @ 21:34)  BSA (m2): 2.29 (10-03-24 @ 21:34)    Physical Exam:    Constitutional: NAD, obese  Respiratory: CTAB, normal respirations  Cardiovascular: S1 and S2, RRR  Gastrointestinal: BS+, soft, LLQ tenderness to superficial palpation  Neurological: AOx3  Psychiatric: Normal mood and normal affect      LABS  10-04    139  |  107  |  21.5[H]  ----------------------------<  188[H]  4.3   |  20.0[L]  |  1.15    Ca    8.0[L]      04 Oct 2024 04:50    TPro  6.4[L]  /  Alb  3.5  /  TBili  0.8  /  DBili  x   /  AST  15  /  ALT  12  /  AlkPhos  72  10-04                          9.3    8.14  )-----------( 236      ( 04 Oct 2024 04:50 )             29.8             Ketone - Urine: Negative mg/dL (10-04 @ 13:10)  Ketone - Urine: Trace mg/dL (10-04 @ 00:20)    Alkaline Phosphatase: 72 U/L (10-04-24 @ 04:50)  Albumin: 3.5 g/dL (10-04-24 @ 04:50)  Aspartate Aminotransferase (AST/SGOT): 15 U/L (10-04-24 @ 04:50)  Alanine Aminotransferase (ALT/SGPT): 12 U/L (10-04-24 @ 04:50)  Alkaline Phosphatase: 80 U/L (10-04-24 @ 01:46)  Alkaline Phosphatase: 85 U/L (10-03-24 @ 23:07)  Aspartate Aminotransferase (AST/SGOT): 20 U/L (10-03-24 @ 23:07)  Albumin: 3.8 g/dL (10-03-24 @ 23:07)  Alanine Aminotransferase (ALT/SGPT): 14 U/L (10-03-24 @ 23:07)      Lipase: 19 U/L (10-03-24 @ 23:07)      CAPILLARY BLOOD GLUCOSE      POCT Blood Glucose.: 193 mg/dL (04 Oct 2024 12:26)  POCT Blood Glucose.: 162 mg/dL (04 Oct 2024 08:34)           INTERVAL HPI/OVERNIGHT EVENTS:  No issues over night.  sugars better    MEDICATIONS  (STANDING):  acetaminophen     Tablet .. 975 milliGRAM(s) Oral every 6 hours  amLODIPine   Tablet 5 milliGRAM(s) Oral daily  atorvastatin 40 milliGRAM(s) Oral at bedtime  dextrose 5%. 1000 milliLiter(s) (100 mL/Hr) IV Continuous <Continuous>  dextrose 5%. 1000 milliLiter(s) (50 mL/Hr) IV Continuous <Continuous>  dextrose 50% Injectable 12.5 Gram(s) IV Push once  dextrose 50% Injectable 25 Gram(s) IV Push once  dextrose 50% Injectable 25 Gram(s) IV Push once  enoxaparin Injectable 40 milliGRAM(s) SubCutaneous every 24 hours  glucagon  Injectable 1 milliGRAM(s) IntraMuscular once  influenza  Vaccine (HIGH DOSE) 0.5 milliLiter(s) IntraMuscular once  insulin glargine Injectable (LANTUS) 20 Unit(s) SubCutaneous at bedtime  insulin lispro (ADMELOG) corrective regimen sliding scale   SubCutaneous every 6 hours  lactated ringers. 1000 milliLiter(s) (100 mL/Hr) IV Continuous <Continuous>  lisinopril 40 milliGRAM(s) Oral daily  metoprolol tartrate 25 milliGRAM(s) Oral every 12 hours  pantoprazole  Injectable 40 milliGRAM(s) IV Push daily  piperacillin/tazobactam IVPB.. 3.375 Gram(s) IV Intermittent every 8 hours  sodium phosphate 15 milliMole(s)/250 mL IVPB 15 milliMole(s) IV Intermittent once  sodium phosphate 15 milliMole(s)/250 mL IVPB 15 milliMole(s) IV Intermittent once    MEDICATIONS  (PRN):  dextrose Oral Gel 15 Gram(s) Oral once PRN Blood Glucose LESS THAN 70 milliGRAM(s)/deciliter  morphine  - Injectable 2 milliGRAM(s) IV Push every 6 hours PRN Moderate Pain (4 - 6)  morphine  - Injectable 4 milliGRAM(s) IV Push every 6 hours PRN Severe Pain (7 - 10)      Allergies    shellfish (Other; Rash)  No Known Drug Allergies            Review of system    No cp, No n/v , abdominal pain better        MEDICATIONS  (STANDING):  acetaminophen     Tablet .. 975 milliGRAM(s) Oral every 6 hours  atorvastatin 40 milliGRAM(s) Oral at bedtime  dextrose 5%. 1000 milliLiter(s) (100 mL/Hr) IV Continuous <Continuous>  dextrose 5%. 1000 milliLiter(s) (50 mL/Hr) IV Continuous <Continuous>  dextrose 50% Injectable 12.5 Gram(s) IV Push once  dextrose 50% Injectable 25 Gram(s) IV Push once  dextrose 50% Injectable 25 Gram(s) IV Push once  glucagon  Injectable 1 milliGRAM(s) IntraMuscular once  influenza  Vaccine (HIGH DOSE) 0.5 milliLiter(s) IntraMuscular once  insulin glargine Injectable (LANTUS) 20 Unit(s) SubCutaneous at bedtime  insulin lispro (ADMELOG) corrective regimen sliding scale   SubCutaneous every 6 hours  lactated ringers. 1000 milliLiter(s) (100 mL/Hr) IV Continuous <Continuous>  lisinopril 40 milliGRAM(s) Oral daily  metoprolol tartrate 25 milliGRAM(s) Oral every 12 hours  pantoprazole  Injectable 40 milliGRAM(s) IV Push daily  piperacillin/tazobactam IVPB.. 3.375 Gram(s) IV Intermittent every 8 hours    MEDICATIONS  (PRN):  dextrose Oral Gel 15 Gram(s) Oral once PRN Blood Glucose LESS THAN 70 milliGRAM(s)/deciliter  morphine  - Injectable 2 milliGRAM(s) IV Push every 6 hours PRN Moderate Pain (4 - 6)  morphine  - Injectable 4 milliGRAM(s) IV Push every 6 hours PRN Severe Pain (7 - 10)      Vital Signs Last 24 Hrs  T(C): 36.7 (04 Oct 2024 17:00), Max: 37.4 (04 Oct 2024 01:52)  T(F): 98.1 (04 Oct 2024 17:00), Max: 99.4 (04 Oct 2024 01:52)  HR: 79 (04 Oct 2024 17:00) (70 - 91)  BP: 130/65 (04 Oct 2024 17:00) (130/65 - 168/78)  BP(mean): --  RR: 18 (04 Oct 2024 17:00) (17 - 18)  SpO2: 92% (04 Oct 2024 17:00) (92% - 98%)    Parameters below as of 04 Oct 2024 17:00  Patient On (Oxygen Delivery Method): room air      Height (cm): 180.3 (10-03-24 @ 21:34)  Weight (kg): 110.7 (10-03-24 @ 21:34)  BMI (kg/m2): 34.1 (10-03-24 @ 21:34)  BSA (m2): 2.29 (10-03-24 @ 21:34)    Physical Exam:    Constitutional: NAD, obese  Respiratory: CTAB, normal respirations  Cardiovascular: S1 and S2, RRR  Gastrointestinal: BS+, soft, LLQ tenderness to superficial palpation  Neurological: AOx3  Psychiatric: Normal mood and normal affect      LABS  10-04    139  |  107  |  21.5[H]  ----------------------------<  188[H]  4.3   |  20.0[L]  |  1.15    Ca    8.0[L]      04 Oct 2024 04:50    TPro  6.4[L]  /  Alb  3.5  /  TBili  0.8  /  DBili  x   /  AST  15  /  ALT  12  /  AlkPhos  72  10-04                          9.3    8.14  )-----------( 236      ( 04 Oct 2024 04:50 )             29.8             Ketone - Urine: Negative mg/dL (10-04 @ 13:10)  Ketone - Urine: Trace mg/dL (10-04 @ 00:20)    Alkaline Phosphatase: 72 U/L (10-04-24 @ 04:50)  Albumin: 3.5 g/dL (10-04-24 @ 04:50)  Aspartate Aminotransferase (AST/SGOT): 15 U/L (10-04-24 @ 04:50)  Alanine Aminotransferase (ALT/SGPT): 12 U/L (10-04-24 @ 04:50)  Alkaline Phosphatase: 80 U/L (10-04-24 @ 01:46)  Alkaline Phosphatase: 85 U/L (10-03-24 @ 23:07)  Aspartate Aminotransferase (AST/SGOT): 20 U/L (10-03-24 @ 23:07)  Albumin: 3.8 g/dL (10-03-24 @ 23:07)  Alanine Aminotransferase (ALT/SGPT): 14 U/L (10-03-24 @ 23:07)      Lipase: 19 U/L (10-03-24 @ 23:07)      CAPILLARY BLOOD GLUCOSE      POCT Blood Glucose.: 193 mg/dL (04 Oct 2024 12:26)  POCT Blood Glucose.: 162 mg/dL (04 Oct 2024 08:34)

## 2024-10-07 NOTE — PROGRESS NOTE ADULT - ASSESSMENT
69M obese w/ T2DM, HTN, HLD, CABGx3, prostate removal (for prostatic cancer) presenting with a 2 day hx of lower abd pain with subjective fever, found on CT to have perforated appendicitis  Consult for diabetes mgmt  home meds: mounjaro 12.5mg weekly, metformin 500mg 1 tab BID, novolin n 75/40  A1c 7.7%    Uncontrolled T2DM- FS reasonable in the hospital despite high doses of insulin at home  -check sugars AC  hs tid  -npo  -continue  with lantus 20 units QHS  -continue with insulin sliding scale  -check cpeptide and MITCH (doubt type 1 but this is listed in the chart)      Cecal mass, ruptured appendix  - surgery pending for Monday,   on abx. care per primary team 69M obese w/ T2DM, HTN, HLD, CABGx3, prostate removal (for prostatic cancer) presenting with a 2 day hx of lower abd pain with subjective fever, found on CT to have perforated appendicitis  Consult for diabetes mgmt  home meds: mounjaro 12.5mg weekly, metformin 500mg 1 tab BID, novolin n 75/40  A1c 7.7%    Uncontrolled T2DM- FS reasonable in the hospital despite high doses of insulin at home  -check sugars AC  hs tid  -npo  -continue  with lantus 20 units QHS  -continue with insulin sliding scale      Cecal mass, ruptured appendix  - surgery pending for today probably,   on abx. care per primary team

## 2024-10-08 LAB
ANION GAP SERPL CALC-SCNC: 16 MMOL/L — SIGNIFICANT CHANGE UP (ref 5–17)
BASOPHILS # BLD AUTO: 0.01 K/UL — SIGNIFICANT CHANGE UP (ref 0–0.2)
BASOPHILS NFR BLD AUTO: 0.1 % — SIGNIFICANT CHANGE UP (ref 0–2)
BUN SERPL-MCNC: 13.4 MG/DL — SIGNIFICANT CHANGE UP (ref 8–20)
CALCIUM SERPL-MCNC: 8.4 MG/DL — SIGNIFICANT CHANGE UP (ref 8.4–10.5)
CHLORIDE SERPL-SCNC: 102 MMOL/L — SIGNIFICANT CHANGE UP (ref 96–108)
CO2 SERPL-SCNC: 18 MMOL/L — LOW (ref 22–29)
CREAT SERPL-MCNC: 0.89 MG/DL — SIGNIFICANT CHANGE UP (ref 0.5–1.3)
EGFR: 92 ML/MIN/1.73M2 — SIGNIFICANT CHANGE UP
EOSINOPHIL # BLD AUTO: 0 K/UL — SIGNIFICANT CHANGE UP (ref 0–0.5)
EOSINOPHIL NFR BLD AUTO: 0 % — SIGNIFICANT CHANGE UP (ref 0–6)
GLUCOSE BLDC GLUCOMTR-MCNC: 205 MG/DL — HIGH (ref 70–99)
GLUCOSE BLDC GLUCOMTR-MCNC: 220 MG/DL — HIGH (ref 70–99)
GLUCOSE BLDC GLUCOMTR-MCNC: 228 MG/DL — HIGH (ref 70–99)
GLUCOSE BLDC GLUCOMTR-MCNC: 252 MG/DL — HIGH (ref 70–99)
GLUCOSE SERPL-MCNC: 203 MG/DL — HIGH (ref 70–99)
GRAM STN FLD: ABNORMAL
GRAM STN FLD: SIGNIFICANT CHANGE UP
HCT VFR BLD CALC: 30.3 % — LOW (ref 39–50)
HGB BLD-MCNC: 9.6 G/DL — LOW (ref 13–17)
IMM GRANULOCYTES NFR BLD AUTO: 0.3 % — SIGNIFICANT CHANGE UP (ref 0–0.9)
LYMPHOCYTES # BLD AUTO: 0.57 K/UL — LOW (ref 1–3.3)
LYMPHOCYTES # BLD AUTO: 6.2 % — LOW (ref 13–44)
MAGNESIUM SERPL-MCNC: 1.7 MG/DL — SIGNIFICANT CHANGE UP (ref 1.6–2.6)
MCHC RBC-ENTMCNC: 25.2 PG — LOW (ref 27–34)
MCHC RBC-ENTMCNC: 31.7 GM/DL — LOW (ref 32–36)
MCV RBC AUTO: 79.5 FL — LOW (ref 80–100)
MONOCYTES # BLD AUTO: 0.39 K/UL — SIGNIFICANT CHANGE UP (ref 0–0.9)
MONOCYTES NFR BLD AUTO: 4.2 % — SIGNIFICANT CHANGE UP (ref 2–14)
NEUTROPHILS # BLD AUTO: 8.23 K/UL — HIGH (ref 1.8–7.4)
NEUTROPHILS NFR BLD AUTO: 89.2 % — HIGH (ref 43–77)
PHOSPHATE SERPL-MCNC: 2.8 MG/DL — SIGNIFICANT CHANGE UP (ref 2.4–4.7)
PLATELET # BLD AUTO: 317 K/UL — SIGNIFICANT CHANGE UP (ref 150–400)
POTASSIUM SERPL-MCNC: 4.1 MMOL/L — SIGNIFICANT CHANGE UP (ref 3.5–5.3)
POTASSIUM SERPL-SCNC: 4.1 MMOL/L — SIGNIFICANT CHANGE UP (ref 3.5–5.3)
RBC # BLD: 3.81 M/UL — LOW (ref 4.2–5.8)
RBC # FLD: 15.2 % — HIGH (ref 10.3–14.5)
SODIUM SERPL-SCNC: 136 MMOL/L — SIGNIFICANT CHANGE UP (ref 135–145)
SPECIMEN SOURCE: SIGNIFICANT CHANGE UP
SPECIMEN SOURCE: SIGNIFICANT CHANGE UP
WBC # BLD: 9.23 K/UL — SIGNIFICANT CHANGE UP (ref 3.8–10.5)
WBC # FLD AUTO: 9.23 K/UL — SIGNIFICANT CHANGE UP (ref 3.8–10.5)

## 2024-10-08 PROCEDURE — 99232 SBSQ HOSP IP/OBS MODERATE 35: CPT

## 2024-10-08 PROCEDURE — 93010 ELECTROCARDIOGRAM REPORT: CPT

## 2024-10-08 RX ORDER — HYDRALAZINE HYDROCHLORIDE 100 MG/1
5 TABLET ORAL ONCE
Refills: 0 | Status: COMPLETED | OUTPATIENT
Start: 2024-10-08 | End: 2024-10-08

## 2024-10-08 RX ORDER — MAGNESIUM SULFATE 500 MG/ML
2 VIAL (ML) INJECTION ONCE
Refills: 0 | Status: COMPLETED | OUTPATIENT
Start: 2024-10-08 | End: 2024-10-08

## 2024-10-08 RX ORDER — INSULIN GLARGINE 300 U/ML
15 INJECTION, SOLUTION SUBCUTANEOUS AT BEDTIME
Refills: 0 | Status: DISCONTINUED | OUTPATIENT
Start: 2024-10-08 | End: 2024-10-10

## 2024-10-08 RX ORDER — ONDANSETRON HCL/PF 4 MG/2 ML
4 VIAL (ML) INJECTION ONCE
Refills: 0 | Status: COMPLETED | OUTPATIENT
Start: 2024-10-08 | End: 2024-10-08

## 2024-10-08 RX ORDER — EZETIMIBE 10 MG/1
1 TABLET ORAL
Refills: 0 | DISCHARGE

## 2024-10-08 RX ORDER — ONDANSETRON HCL/PF 4 MG/2 ML
4 VIAL (ML) INJECTION EVERY 6 HOURS
Refills: 0 | Status: DISCONTINUED | OUTPATIENT
Start: 2024-10-08 | End: 2024-10-11

## 2024-10-08 RX ORDER — KETOROLAC TROMETHAMINE 10 MG/1
15 TABLET, FILM COATED ORAL EVERY 6 HOURS
Refills: 0 | Status: DISCONTINUED | OUTPATIENT
Start: 2024-10-08 | End: 2024-10-09

## 2024-10-08 RX ORDER — ASPIRIN 325 MG
0 TABLET ORAL
Refills: 0 | DISCHARGE

## 2024-10-08 RX ADMIN — ATORVASTATIN CALCIUM 40 MILLIGRAM(S): 10 TABLET, FILM COATED ORAL at 22:13

## 2024-10-08 RX ADMIN — Medication 4 MILLIGRAM(S): at 03:04

## 2024-10-08 RX ADMIN — Medication 1000 MILLIGRAM(S): at 08:54

## 2024-10-08 RX ADMIN — PIPERACILLIN SODIUM AND TAZOBACTAM SODIUM 25 GRAM(S): 12; 1.5 INJECTION, POWDER, LYOPHILIZED, FOR SOLUTION INTRAVENOUS at 05:54

## 2024-10-08 RX ADMIN — Medication 4: at 08:48

## 2024-10-08 RX ADMIN — Medication 4 MILLIGRAM(S): at 08:11

## 2024-10-08 RX ADMIN — HYDRALAZINE HYDROCHLORIDE 5 MILLIGRAM(S): 100 TABLET ORAL at 15:22

## 2024-10-08 RX ADMIN — Medication 500 MILLIGRAM(S): at 05:53

## 2024-10-08 RX ADMIN — PIPERACILLIN SODIUM AND TAZOBACTAM SODIUM 25 GRAM(S): 12; 1.5 INJECTION, POWDER, LYOPHILIZED, FOR SOLUTION INTRAVENOUS at 22:02

## 2024-10-08 RX ADMIN — Medication 6: at 02:03

## 2024-10-08 RX ADMIN — Medication 400 MILLIGRAM(S): at 20:45

## 2024-10-08 RX ADMIN — Medication 400 MILLIGRAM(S): at 14:33

## 2024-10-08 RX ADMIN — Medication 4: at 17:21

## 2024-10-08 RX ADMIN — Medication 1000 MILLIGRAM(S): at 21:45

## 2024-10-08 RX ADMIN — KETOROLAC TROMETHAMINE 15 MILLIGRAM(S): 10 TABLET, FILM COATED ORAL at 17:37

## 2024-10-08 RX ADMIN — Medication 25 MILLIGRAM(S): at 15:23

## 2024-10-08 RX ADMIN — PANTOPRAZOLE SODIUM 40 MILLIGRAM(S): 40 TABLET, DELAYED RELEASE ORAL at 14:26

## 2024-10-08 RX ADMIN — INSULIN GLARGINE 15 UNIT(S): 300 INJECTION, SOLUTION SUBCUTANEOUS at 22:01

## 2024-10-08 RX ADMIN — Medication 5000 UNIT(S): at 05:54

## 2024-10-08 RX ADMIN — Medication 5000 UNIT(S): at 14:27

## 2024-10-08 RX ADMIN — Medication 400 MILLIGRAM(S): at 01:57

## 2024-10-08 RX ADMIN — Medication 25 MILLIGRAM(S): at 05:53

## 2024-10-08 RX ADMIN — PIPERACILLIN SODIUM AND TAZOBACTAM SODIUM 25 GRAM(S): 12; 1.5 INJECTION, POWDER, LYOPHILIZED, FOR SOLUTION INTRAVENOUS at 14:33

## 2024-10-08 RX ADMIN — Medication 100 MILLILITER(S): at 15:25

## 2024-10-08 RX ADMIN — Medication 25 GRAM(S): at 17:20

## 2024-10-08 RX ADMIN — Medication 5000 UNIT(S): at 22:01

## 2024-10-08 RX ADMIN — Medication 400 MILLIGRAM(S): at 08:12

## 2024-10-08 RX ADMIN — Medication 1000 MILLIGRAM(S): at 02:01

## 2024-10-08 NOTE — PROGRESS NOTE ADULT - ASSESSMENT
69M obese w/ T2DM, HTN, HLD, CABGx3, prostate removal, presented with a 2 day hx of lower abd pain with subjective fever, found on CT to have perforated appendicitis.    Consulted for diabetes management  Home regimen: mounjaro 12.5mg weekly, metformin 500mg 1 tab BID, novolin n insulin 75/40  Current a1c: 7.7%    1. T2DM  - Glucoses stable, NPO  - Continue moderate correction scale  - Will resume basal/prandial insulin when diet is resumed  - POCT goal 120-180    2. Cecal mass, ruptured appendix  - S/p diagnostic laparoscopy, open right hemicolectomy  - Pain control, IVF  - Care per surgical team    3. HLD  - Continue statin 69M obese w/ T2DM, HTN, HLD, CABGx3, prostate removal, presented with a 2 day hx of lower abd pain with subjective fever, found on CT to have perforated appendicitis.    Consulted for diabetes management  Home regimen: mounjaro 12.5mg weekly, metformin 500mg 1 tab BID, novolin n insulin 75/40  Current a1c: 7.7%    1. T2DM  - NPO  - Start lantus 15 units qhs  - Continue correction scale  - POCT goal 120-180    2. Cecal mass, ruptured appendix  - S/p diagnostic laparoscopy, open right hemicolectomy  - Pain control, IVF  - Care per surgical team    3. HLD  - Continue statin

## 2024-10-08 NOTE — PROGRESS NOTE ADULT - SUBJECTIVE AND OBJECTIVE BOX
Subjective: Patient seen and examined at bedside, reports an episode of emesis earlier this AM and feeling sore. Denies flatus or BM.     STATUS POST: open R hemicolectomy   POST OPERATIVE DAY #: 1    MEDICATIONS  (STANDING):  acetaminophen   IVPB .. 1000 milliGRAM(s) IV Intermittent every 6 hours  atorvastatin 40 milliGRAM(s) Oral at bedtime  dextrose 5%. 1000 milliLiter(s) (50 mL/Hr) IV Continuous <Continuous>  dextrose 5%. 1000 milliLiter(s) (100 mL/Hr) IV Continuous <Continuous>  dextrose 50% Injectable 25 Gram(s) IV Push once  dextrose 50% Injectable 25 Gram(s) IV Push once  dextrose 50% Injectable 12.5 Gram(s) IV Push once  glucagon  Injectable 1 milliGRAM(s) IntraMuscular once  heparin   Injectable 5000 Unit(s) SubCutaneous every 8 hours  influenza  Vaccine (HIGH DOSE) 0.5 milliLiter(s) IntraMuscular once  insulin lispro (ADMELOG) corrective regimen sliding scale   SubCutaneous every 6 hours  lactated ringers. 1000 milliLiter(s) (100 mL/Hr) IV Continuous <Continuous>  metoprolol tartrate 25 milliGRAM(s) Oral every 12 hours  pantoprazole  Injectable 40 milliGRAM(s) IV Push daily  piperacillin/tazobactam IVPB.. 3.375 Gram(s) IV Intermittent every 8 hours    MEDICATIONS  (PRN):  dextrose Oral Gel 15 Gram(s) Oral once PRN Blood Glucose LESS THAN 70 milliGRAM(s)/deciliter  ketorolac   Injectable 15 milliGRAM(s) IV Push every 6 hours PRN break through pain  ondansetron Injectable 4 milliGRAM(s) IV Push every 6 hours PRN Nausea and/or Vomiting  oxyCODONE    IR 10 milliGRAM(s) Oral every 4 hours PRN Severe Pain (7 - 10)  oxyCODONE    IR 5 milliGRAM(s) Oral every 4 hours PRN Moderate Pain (4 - 6)    Vital Signs Last 24 Hrs  T(C): 36.9 (08 Oct 2024 08:09), Max: 36.9 (08 Oct 2024 08:09)  T(F): 98.4 (08 Oct 2024 08:09), Max: 98.4 (08 Oct 2024 08:09)  HR: 85 (08 Oct 2024 08:09) (63 - 85)  BP: 187/84 (08 Oct 2024 08:09) (151/58 - 187/84)  BP(mean): 90 (08 Oct 2024 00:00) (84 - 107)  RR: 20 (08 Oct 2024 08:09) (12 - 22)  SpO2: 90% (08 Oct 2024 08:09) (90% - 100%)  Parameters below as of 08 Oct 2024 08:09  Patient On (Oxygen Delivery Method): room air    Physical Exam:  Constitutional: NAD  HEENT: PERRL, EOMI  Respiratory: Respirations non-labored, no accessory muscle use  Gastrointestinal: Soft, appropriately tender to palpation, + distention, prevena in place with good seal and suction   Extremities: moves extremities spontaneously   Neurological: A&O x 3; without gross deficit    LABS:                     9.6    9.23  )-----------( 317      ( 08 Oct 2024 06:47 )             30.3   10-08  136  |  102  |  13.4  ----------------------------<  203[H]  4.1   |  18.0[L]  |  0.89  Ca    8.4      08 Oct 2024 06:47  Phos  2.8     10-08  Mg     1.7     10-08    A: Patient is a 68 yo M s/p open R hemicolectomy for a cecal mass and perforated appendix, pod#1.      Plan:   Was on CLD, but after episode of vomiting --> NPO w sips and chips   monitor abdominal exam, if has more episodes of vomiting, will possibly need NGT  monitor for bowel function   F/u TOV   f/u surgical pathology   DVT ppx with SQH and SCDs  home meds

## 2024-10-08 NOTE — CHART NOTE - NSCHARTNOTEFT_GEN_A_CORE
Post-op Check    Subjective:  Pt offers no acute complaints at this time. Has mild abdominal tenderness. Has ice packs on his abdomen which helps with pain. Has mild intermittent nausea without vomiting. Denies any chest pain, SOB, palpitations.     STATUS POST:      POST OPERATIVE DAY #: 0    MEDICATIONS  (STANDING):  acetaminophen   IVPB .. 1000 milliGRAM(s) IV Intermittent every 6 hours  atorvastatin 40 milliGRAM(s) Oral at bedtime  dextrose 5%. 1000 milliLiter(s) (50 mL/Hr) IV Continuous <Continuous>  dextrose 5%. 1000 milliLiter(s) (100 mL/Hr) IV Continuous <Continuous>  dextrose 50% Injectable 25 Gram(s) IV Push once  dextrose 50% Injectable 12.5 Gram(s) IV Push once  dextrose 50% Injectable 25 Gram(s) IV Push once  glucagon  Injectable 1 milliGRAM(s) IntraMuscular once  heparin   Injectable 5000 Unit(s) SubCutaneous every 8 hours  influenza  Vaccine (HIGH DOSE) 0.5 milliLiter(s) IntraMuscular once  insulin lispro (ADMELOG) corrective regimen sliding scale   SubCutaneous every 6 hours  lactated ringers. 1000 milliLiter(s) (100 mL/Hr) IV Continuous <Continuous>  metoprolol tartrate 25 milliGRAM(s) Oral every 12 hours  pantoprazole  Injectable 40 milliGRAM(s) IV Push daily  piperacillin/tazobactam IVPB.. 3.375 Gram(s) IV Intermittent every 8 hours    MEDICATIONS  (PRN):  dextrose Oral Gel 15 Gram(s) Oral once PRN Blood Glucose LESS THAN 70 milliGRAM(s)/deciliter  oxyCODONE    IR 5 milliGRAM(s) Oral every 4 hours PRN Moderate Pain (4 - 6)  oxyCODONE    IR 10 milliGRAM(s) Oral every 4 hours PRN Severe Pain (7 - 10)      Vital Signs Last 24 Hrs  T(C): 36.6 (08 Oct 2024 00:55), Max: 36.8 (07 Oct 2024 04:27)  T(F): 97.8 (08 Oct 2024 00:55), Max: 98.2 (07 Oct 2024 04:27)  HR: 72 (08 Oct 2024 00:55) (63 - 77)  BP: 166/70 (08 Oct 2024 00:55) (148/70 - 180/77)  BP(mean): 90 (08 Oct 2024 00:00) (84 - 107)  RR: 18 (08 Oct 2024 00:55) (12 - 22)  SpO2: 96% (08 Oct 2024 00:55) (92% - 100%)    Parameters below as of 08 Oct 2024 00:55  Patient On (Oxygen Delivery Method): room air        Physical Exam:    General: Laying comfortably in bed, NAD, sleeping but arousable  HEENT: supple neck, trachea midline  Neck: No JVD,   Respiratory: no accessory muscle use, respirations non-labored  Abdomen: Large body habitus, soft, mild distention, RLQ and RUQ tendernss with palpation, pervena midline c/d/i  Neurological: A&O x 3; without gross deficit  : mohan in place, urine mahamed    A: 68 yo M, postoperative check s/p diagnostic laparoscopy, ex lap with right hemicolectomy for perforated appendix and cecal mass with possible peritoneal implant and omental implant    P:  Continue current care  Pain control  OOB as tolerated  Encourage IS  DVT ppx, SCDs and heparin at 330   F/u pathology reports   f/u cultures  remove mohan tomorrow  hold ASA

## 2024-10-08 NOTE — PROGRESS NOTE ADULT - SUBJECTIVE AND OBJECTIVE BOX
[facility]                                   [address] [place] [address]                                                      PHONE: [phone]                                                         FAX: [phone]  [email or web address]  ---------------------------------------------------------------------------------------------------------------------------------    Overnight events/patient complaints: patient seen at bedside. he's feeling okay this morning.       shellfish (Other; Rash)  No Known Drug Allergies    MEDICATIONS  (STANDING):  acetaminophen   IVPB .. 1000 milliGRAM(s) IV Intermittent every 6 hours  atorvastatin 40 milliGRAM(s) Oral at bedtime  dextrose 5%. 1000 milliLiter(s) (50 mL/Hr) IV Continuous <Continuous>  dextrose 5%. 1000 milliLiter(s) (100 mL/Hr) IV Continuous <Continuous>  dextrose 50% Injectable 25 Gram(s) IV Push once  dextrose 50% Injectable 12.5 Gram(s) IV Push once  dextrose 50% Injectable 25 Gram(s) IV Push once  glucagon  Injectable 1 milliGRAM(s) IntraMuscular once  heparin   Injectable 5000 Unit(s) SubCutaneous every 8 hours  influenza  Vaccine (HIGH DOSE) 0.5 milliLiter(s) IntraMuscular once  insulin lispro (ADMELOG) corrective regimen sliding scale   SubCutaneous every 6 hours  lactated ringers. 1000 milliLiter(s) (100 mL/Hr) IV Continuous <Continuous>  magnesium sulfate  IVPB 2 Gram(s) IV Intermittent once  metoprolol tartrate 25 milliGRAM(s) Oral every 12 hours  pantoprazole  Injectable 40 milliGRAM(s) IV Push daily  piperacillin/tazobactam IVPB.. 3.375 Gram(s) IV Intermittent every 8 hours    MEDICATIONS  (PRN):  dextrose Oral Gel 15 Gram(s) Oral once PRN Blood Glucose LESS THAN 70 milliGRAM(s)/deciliter  ketorolac   Injectable 15 milliGRAM(s) IV Push every 6 hours PRN break through pain  ondansetron Injectable 4 milliGRAM(s) IV Push every 6 hours PRN Nausea and/or Vomiting  oxyCODONE    IR 5 milliGRAM(s) Oral every 4 hours PRN Moderate Pain (4 - 6)  oxyCODONE    IR 10 milliGRAM(s) Oral every 4 hours PRN Severe Pain (7 - 10)      Vital Signs Last 24 Hrs  T(C): 36.9 (08 Oct 2024 08:09), Max: 36.9 (08 Oct 2024 08:09)  T(F): 98.4 (08 Oct 2024 08:09), Max: 98.4 (08 Oct 2024 08:09)  HR: 70 (08 Oct 2024 10:00) (63 - 85)  BP: 146/65 (08 Oct 2024 10:00) (146/65 - 187/84)  BP(mean): 90 (08 Oct 2024 00:00) (84 - 107)  RR: 18 (08 Oct 2024 10:00) (12 - 22)  SpO2: 96% (08 Oct 2024 10:00) (90% - 100%)    Parameters below as of 08 Oct 2024 10:00  Patient On (Oxygen Delivery Method): room air      ICU Vital Signs Last 24 Hrs  LILIANA REHMAN  I&O's Detail    07 Oct 2024 07:01  -  08 Oct 2024 07:00  --------------------------------------------------------  IN:    Lactated Ringers: 700 mL  Total IN: 700 mL    OUT:    Indwelling Catheter - Urethral (mL): 1700 mL  Total OUT: 1700 mL    Total NET: -1000 mL        Drug Dosing Weight  LILIANA REHMAN      PHYSICAL EXAM:  General: NAD  HEENT: Head; normocephalic, atraumatic.  Eyes: Pupils reactive, cornea wnl.  Neck: Supple, no nodes adenopathy, no NVD or carotid bruit or thyromegaly.  CARDIOVASCULAR: Normal S1 and S2, No murmur, rub, gallop or lift.   LUNGS: No rales, rhonchi or wheeze. Normal breath sounds bilaterally.  ABDOMEN: Soft, nontender without mass or organomegaly. bowel sounds normoactive.  EXTREMITIES: No clubbing, cyanosis or edema. Distal pulses wnl.   SKIN: warm and dry with normal turgor.  NEURO: Alert/oriented x 3  PSYCH: normal affect.        LABS:                        9.6    9.23  )-----------( 317      ( 08 Oct 2024 06:47 )             30.3     10-08    136  |  102  |  13.4  ----------------------------<  203[H]  4.1   |  18.0[L]  |  0.89    Ca    8.4      08 Oct 2024 06:47  Phos  2.8     10-08  Mg     1.7     10-08      LILIANA REHMAN      PT/INR - ( 07 Oct 2024 04:41 )   PT: 13.0 sec;   INR: 1.12 ratio         PTT - ( 07 Oct 2024 04:41 )  PTT:31.0 sec  Urinalysis Basic - ( 08 Oct 2024 06:47 )    Color: x / Appearance: x / SG: x / pH: x  Gluc: 203 mg/dL / Ketone: x  / Bili: x / Urobili: x   Blood: x / Protein: x / Nitrite: x   Leuk Esterase: x / RBC: x / WBC x   Sq Epi: x / Non Sq Epi: x / Bacteria: x            ASSESSMENT AND PLAN:  LILIANA REHMAN is an 69y Male  w/ PmHX of CAD S/P CABG 2015 HTN, HLD, DM, prostate Sx presenting with a two day history of lower abdominal pain found with a cecal mass and appendicitis. s/p ex lap and right hemicolectomy on 10/7/24.    - patient tolerated surgery well. awaiting pathology report  - supportive care and antibiotics per surgery  - resume home cardiac medications when able to    I will arrange close outpatient follow up. Please call back if needed.

## 2024-10-08 NOTE — PROGRESS NOTE ADULT - SUBJECTIVE AND OBJECTIVE BOX
INTERVAL EVENTS:  Follow up diabetes management. Reports no pain at time of exam. Glucoses stable over last 24 hrs.     MEDICATIONS  (STANDING):  acetaminophen   IVPB .. 1000 milliGRAM(s) IV Intermittent every 6 hours  atorvastatin 40 milliGRAM(s) Oral at bedtime  dextrose 5%. 1000 milliLiter(s) (100 mL/Hr) IV Continuous <Continuous>  dextrose 5%. 1000 milliLiter(s) (50 mL/Hr) IV Continuous <Continuous>  dextrose 50% Injectable 25 Gram(s) IV Push once  dextrose 50% Injectable 12.5 Gram(s) IV Push once  dextrose 50% Injectable 25 Gram(s) IV Push once  glucagon  Injectable 1 milliGRAM(s) IntraMuscular once  heparin   Injectable 5000 Unit(s) SubCutaneous every 8 hours  influenza  Vaccine (HIGH DOSE) 0.5 milliLiter(s) IntraMuscular once  insulin lispro (ADMELOG) corrective regimen sliding scale   SubCutaneous every 6 hours  lactated ringers. 1000 milliLiter(s) (100 mL/Hr) IV Continuous <Continuous>  metoprolol tartrate 25 milliGRAM(s) Oral every 12 hours  pantoprazole  Injectable 40 milliGRAM(s) IV Push daily  piperacillin/tazobactam IVPB.. 3.375 Gram(s) IV Intermittent every 8 hours    MEDICATIONS  (PRN):  dextrose Oral Gel 15 Gram(s) Oral once PRN Blood Glucose LESS THAN 70 milliGRAM(s)/deciliter  ondansetron Injectable 4 milliGRAM(s) IV Push every 6 hours PRN Nausea and/or Vomiting  oxyCODONE    IR 5 milliGRAM(s) Oral every 4 hours PRN Moderate Pain (4 - 6)  oxyCODONE    IR 10 milliGRAM(s) Oral every 4 hours PRN Severe Pain (7 - 10)    Allergies  shellfish (Other; Rash)  No Known Drug Allergies    Vital Signs Last 24 Hrs  T(C): 36.9 (08 Oct 2024 08:09), Max: 36.9 (08 Oct 2024 08:09)  T(F): 98.4 (08 Oct 2024 08:09), Max: 98.4 (08 Oct 2024 08:09)  HR: 85 (08 Oct 2024 08:09) (63 - 85)  BP: 187/84 (08 Oct 2024 08:09) (151/58 - 187/84)  BP(mean): 90 (08 Oct 2024 00:00) (84 - 107)  RR: 20 (08 Oct 2024 08:09) (12 - 22)  SpO2: 90% (08 Oct 2024 08:09) (90% - 100%)    Parameters below as of 08 Oct 2024 08:09  Patient On (Oxygen Delivery Method): room air    PHYSICAL EXAM:  General: No apparent distress  Respiratory: Lungs clear bilaterally  Cardiac: +S1, S2, no m/r/g  GI: +BS, soft, non tender, non distended  Extremities: No peripheral edema  Neuro: A+O X3    LABS:                        9.6    9.23  )-----------( 317      ( 08 Oct 2024 06:47 )             30.3     10-08    136  |  102  |  13.4  ----------------------------<  203[H]  4.1   |  18.0[L]  |  0.89    Ca    8.4      08 Oct 2024 06:47  Phos  2.8     10-08  Mg     1.7     10-08      Urinalysis Basic - ( 08 Oct 2024 06:47 )    Color: x / Appearance: x / SG: x / pH: x  Gluc: 203 mg/dL / Ketone: x  / Bili: x / Urobili: x   Blood: x / Protein: x / Nitrite: x   Leuk Esterase: x / RBC: x / WBC x   Sq Epi: x / Non Sq Epi: x / Bacteria: x    POCT Blood Glucose.: 205 mg/dL (10-08-24 @ 08:19)  POCT Blood Glucose.: 252 mg/dL (10-08-24 @ 01:59)  POCT Blood Glucose.: 217 mg/dL (10-07-24 @ 21:35)  POCT Blood Glucose.: 125 mg/dL (10-07-24 @ 13:24)

## 2024-10-09 LAB
ANION GAP SERPL CALC-SCNC: 11 MMOL/L — SIGNIFICANT CHANGE UP (ref 5–17)
BUN SERPL-MCNC: 17.3 MG/DL — SIGNIFICANT CHANGE UP (ref 8–20)
CALCIUM SERPL-MCNC: 8.4 MG/DL — SIGNIFICANT CHANGE UP (ref 8.4–10.5)
CHLORIDE SERPL-SCNC: 105 MMOL/L — SIGNIFICANT CHANGE UP (ref 96–108)
CO2 SERPL-SCNC: 22 MMOL/L — SIGNIFICANT CHANGE UP (ref 22–29)
CREAT SERPL-MCNC: 1.13 MG/DL — SIGNIFICANT CHANGE UP (ref 0.5–1.3)
CULTURE RESULTS: SIGNIFICANT CHANGE UP
EGFR: 70 ML/MIN/1.73M2 — SIGNIFICANT CHANGE UP
GAD65 AB SER-MCNC: 0 NMOL/L — SIGNIFICANT CHANGE UP
GLUCOSE BLDC GLUCOMTR-MCNC: 137 MG/DL — HIGH (ref 70–99)
GLUCOSE BLDC GLUCOMTR-MCNC: 158 MG/DL — HIGH (ref 70–99)
GLUCOSE BLDC GLUCOMTR-MCNC: 176 MG/DL — HIGH (ref 70–99)
GLUCOSE BLDC GLUCOMTR-MCNC: 195 MG/DL — HIGH (ref 70–99)
GLUCOSE BLDC GLUCOMTR-MCNC: 205 MG/DL — HIGH (ref 70–99)
GLUCOSE BLDC GLUCOMTR-MCNC: 213 MG/DL — HIGH (ref 70–99)
GLUCOSE BLDC GLUCOMTR-MCNC: 271 MG/DL — HIGH (ref 70–99)
GLUCOSE SERPL-MCNC: 164 MG/DL — HIGH (ref 70–99)
HCT VFR BLD CALC: 29.6 % — LOW (ref 39–50)
HGB BLD-MCNC: 9.3 G/DL — LOW (ref 13–17)
MAGNESIUM SERPL-MCNC: 2 MG/DL — SIGNIFICANT CHANGE UP (ref 1.6–2.6)
MCHC RBC-ENTMCNC: 25 PG — LOW (ref 27–34)
MCHC RBC-ENTMCNC: 31.4 GM/DL — LOW (ref 32–36)
MCV RBC AUTO: 79.6 FL — LOW (ref 80–100)
PHOSPHATE SERPL-MCNC: 1.5 MG/DL — LOW (ref 2.4–4.7)
PLATELET # BLD AUTO: 340 K/UL — SIGNIFICANT CHANGE UP (ref 150–400)
POTASSIUM SERPL-MCNC: 3.8 MMOL/L — SIGNIFICANT CHANGE UP (ref 3.5–5.3)
POTASSIUM SERPL-SCNC: 3.8 MMOL/L — SIGNIFICANT CHANGE UP (ref 3.5–5.3)
RBC # BLD: 3.72 M/UL — LOW (ref 4.2–5.8)
RBC # FLD: 15.6 % — HIGH (ref 10.3–14.5)
SODIUM SERPL-SCNC: 138 MMOL/L — SIGNIFICANT CHANGE UP (ref 135–145)
SPECIMEN SOURCE: SIGNIFICANT CHANGE UP
WBC # BLD: 8.89 K/UL — SIGNIFICANT CHANGE UP (ref 3.8–10.5)
WBC # FLD AUTO: 8.89 K/UL — SIGNIFICANT CHANGE UP (ref 3.8–10.5)

## 2024-10-09 PROCEDURE — 99232 SBSQ HOSP IP/OBS MODERATE 35: CPT

## 2024-10-09 RX ORDER — HYDRALAZINE HYDROCHLORIDE 100 MG/1
5 TABLET ORAL ONCE
Refills: 0 | Status: COMPLETED | OUTPATIENT
Start: 2024-10-09 | End: 2024-10-09

## 2024-10-09 RX ORDER — INSULIN LISPRO 100/ML
VIAL (ML) SUBCUTANEOUS
Refills: 0 | Status: DISCONTINUED | OUTPATIENT
Start: 2024-10-09 | End: 2024-10-10

## 2024-10-09 RX ORDER — SOD PHOS DI, MONO/K PHOS MONO 250 MG
1 TABLET ORAL THREE TIMES A DAY
Refills: 0 | Status: COMPLETED | OUTPATIENT
Start: 2024-10-09 | End: 2024-10-10

## 2024-10-09 RX ORDER — ASPIRIN 325 MG
81 TABLET ORAL DAILY
Refills: 0 | Status: DISCONTINUED | OUTPATIENT
Start: 2024-10-09 | End: 2024-10-11

## 2024-10-09 RX ADMIN — ATORVASTATIN CALCIUM 40 MILLIGRAM(S): 10 TABLET, FILM COATED ORAL at 21:36

## 2024-10-09 RX ADMIN — PANTOPRAZOLE SODIUM 40 MILLIGRAM(S): 40 TABLET, DELAYED RELEASE ORAL at 08:48

## 2024-10-09 RX ADMIN — KETOROLAC TROMETHAMINE 15 MILLIGRAM(S): 10 TABLET, FILM COATED ORAL at 18:18

## 2024-10-09 RX ADMIN — Medication 5000 UNIT(S): at 13:20

## 2024-10-09 RX ADMIN — Medication 100 MILLILITER(S): at 05:13

## 2024-10-09 RX ADMIN — INSULIN GLARGINE 15 UNIT(S): 300 INJECTION, SOLUTION SUBCUTANEOUS at 21:35

## 2024-10-09 RX ADMIN — Medication 40 MILLIGRAM(S): at 05:18

## 2024-10-09 RX ADMIN — OXYCODONE HYDROCHLORIDE 5 MILLIGRAM(S): 30 TABLET, FILM COATED, EXTENDED RELEASE ORAL at 13:16

## 2024-10-09 RX ADMIN — Medication 1 PACKET(S): at 21:39

## 2024-10-09 RX ADMIN — Medication 2: at 08:48

## 2024-10-09 RX ADMIN — Medication 5000 UNIT(S): at 05:18

## 2024-10-09 RX ADMIN — Medication 100 MILLILITER(S): at 18:19

## 2024-10-09 RX ADMIN — Medication 4: at 02:24

## 2024-10-09 RX ADMIN — KETOROLAC TROMETHAMINE 15 MILLIGRAM(S): 10 TABLET, FILM COATED ORAL at 08:48

## 2024-10-09 RX ADMIN — Medication 1 PACKET(S): at 13:21

## 2024-10-09 RX ADMIN — Medication 81 MILLIGRAM(S): at 13:16

## 2024-10-09 RX ADMIN — Medication 5000 UNIT(S): at 21:38

## 2024-10-09 RX ADMIN — PIPERACILLIN SODIUM AND TAZOBACTAM SODIUM 25 GRAM(S): 12; 1.5 INJECTION, POWDER, LYOPHILIZED, FOR SOLUTION INTRAVENOUS at 13:20

## 2024-10-09 RX ADMIN — PIPERACILLIN SODIUM AND TAZOBACTAM SODIUM 25 GRAM(S): 12; 1.5 INJECTION, POWDER, LYOPHILIZED, FOR SOLUTION INTRAVENOUS at 21:36

## 2024-10-09 RX ADMIN — Medication 2: at 05:28

## 2024-10-09 RX ADMIN — PIPERACILLIN SODIUM AND TAZOBACTAM SODIUM 25 GRAM(S): 12; 1.5 INJECTION, POWDER, LYOPHILIZED, FOR SOLUTION INTRAVENOUS at 05:15

## 2024-10-09 RX ADMIN — Medication 25 MILLIGRAM(S): at 05:19

## 2024-10-09 RX ADMIN — Medication 25 MILLIGRAM(S): at 18:14

## 2024-10-09 RX ADMIN — Medication 2: at 21:35

## 2024-10-09 RX ADMIN — HYDRALAZINE HYDROCHLORIDE 5 MILLIGRAM(S): 100 TABLET ORAL at 22:55

## 2024-10-09 RX ADMIN — Medication 6: at 18:18

## 2024-10-09 NOTE — PROGRESS NOTE ADULT - ASSESSMENT
69M obese w/ T2DM, HTN, HLD, CABGx3, prostate removal, presented with a 2 day hx of lower abd pain with subjective fever, found on CT to have perforated appendicitis.    Consulted for diabetes management  Home regimen: mounjaro 12.5mg weekly, metformin 500mg 1 tab BID, novolin n insulin 75/40  Current a1c: 7.7%  Endocrinologist: Dr Wan    1. T2DM  - Continue lantus 15 units qhs and moderate correction scale with meals  - As diet is advanced, will add prandial insulin  - POCT goal 120-180    2. Cecal mass, ruptured appendix  - S/p diagnostic laparoscopy, open right hemicolectomy  - Pain control  - Care per surgical team    3. HLD  - Continue statin

## 2024-10-09 NOTE — PROGRESS NOTE ADULT - SUBJECTIVE AND OBJECTIVE BOX
SUBJECTIVE / 24H EVENTS:    Pt seen and examined at bedside by the team during rounds. Mr. Rodriguez is a 70y/o male with PMH of DM, htn, HLD, prostate cancer admitted with perforated appendicitis with cecal mass. Had exploratory laparoscopy and open right hemicolectomy w/ ileocecal anastamosis POD#2. Patient is currently not in any pain. He has been using the incentive spirometry and wants his diet advanced and plans on walking today. He has passed gas and urine output is adequate. Pt denies CP, SOB, N/V, fever, chills.     MEDICATIONS  (STANDING):  atorvastatin 40 milliGRAM(s) Oral at bedtime  dextrose 5%. 1000 milliLiter(s) (100 mL/Hr) IV Continuous <Continuous>  dextrose 5%. 1000 milliLiter(s) (50 mL/Hr) IV Continuous <Continuous>  dextrose 50% Injectable 25 Gram(s) IV Push once  dextrose 50% Injectable 25 Gram(s) IV Push once  dextrose 50% Injectable 12.5 Gram(s) IV Push once  glucagon  Injectable 1 milliGRAM(s) IntraMuscular once  heparin   Injectable 5000 Unit(s) SubCutaneous every 8 hours  influenza  Vaccine (HIGH DOSE) 0.5 milliLiter(s) IntraMuscular once  insulin glargine Injectable (LANTUS) 15 Unit(s) SubCutaneous at bedtime  insulin lispro (ADMELOG) corrective regimen sliding scale   SubCutaneous every 6 hours  lactated ringers. 1000 milliLiter(s) (100 mL/Hr) IV Continuous <Continuous>  lisinopril 40 milliGRAM(s) Oral daily  metoprolol tartrate 25 milliGRAM(s) Oral every 12 hours  pantoprazole  Injectable 40 milliGRAM(s) IV Push daily  piperacillin/tazobactam IVPB.. 3.375 Gram(s) IV Intermittent every 8 hours    MEDICATIONS  (PRN):  dextrose Oral Gel 15 Gram(s) Oral once PRN Blood Glucose LESS THAN 70 milliGRAM(s)/deciliter  ketorolac   Injectable 15 milliGRAM(s) IV Push every 6 hours PRN break through pain  ondansetron Injectable 4 milliGRAM(s) IV Push every 6 hours PRN Nausea and/or Vomiting  oxyCODONE    IR 10 milliGRAM(s) Oral every 4 hours PRN Severe Pain (7 - 10)  oxyCODONE    IR 5 milliGRAM(s) Oral every 4 hours PRN Moderate Pain (4 - 6)      Vital Signs Last 24 Hrs  T(C): 36.7 (09 Oct 2024 04:30), Max: 36.9 (08 Oct 2024 08:09)  T(F): 98.1 (09 Oct 2024 04:30), Max: 98.5 (08 Oct 2024 21:00)  HR: 71 (09 Oct 2024 04:30) (64 - 85)  BP: 177/76 (09 Oct 2024 04:30) (111/56 - 192/65)  BP(mean): --  RR: 18 (09 Oct 2024 04:30) (17 - 20)  SpO2: 94% (09 Oct 2024 04:30) (90% - 96%)    Parameters below as of 09 Oct 2024 04:30  Patient On (Oxygen Delivery Method): room air        Physical Exam  Constitutional: patient appears comfortable resting in bed, in no apparent distress  Respiratory: respirations are unlabored, no accessory muscle use, no conversational dyspnea  Cardiovascular: regular rate & rhythm  Gastrointestinal: abdomen is soft & non-distended, non-tender, no rebound tenderness / guarding  Neurological: GCS15, A&O x 3  Skin: mucous membranes moist, no diaphoresis, pallor, cyanosis or jaundice      I&O's Detail    08 Oct 2024 07:01  -  09 Oct 2024 07:00  --------------------------------------------------------  IN:    Lactated Ringers: 1000 mL  Total IN: 1000 mL    OUT:    Indwelling Catheter - Urethral (mL): 350 mL    Voided (mL): 700 mL  Total OUT: 1050 mL    Total NET: -50 mL          LABS:                        9.3    8.89  )-----------( 340      ( 09 Oct 2024 05:10 )             29.6     10-09    138  |  105  |  17.3  ----------------------------<  164[H]  3.8   |  22.0  |  1.13    Ca    8.4      09 Oct 2024 05:10  Phos  1.5     10-09  Mg     2.0     10-09        Urinalysis Basic - ( 09 Oct 2024 05:10 )    Color: x / Appearance: x / SG: x / pH: x  Gluc: 164 mg/dL / Ketone: x  / Bili: x / Urobili: x   Blood: x / Protein: x / Nitrite: x   Leuk Esterase: x / RBC: x / WBC x   Sq Epi: x / Non Sq Epi: x / Bacteria: x       SUBJECTIVE / 24H EVENTS:    Pt seen and examined at bedside by the team during rounds. Mr. Rodriguez is a 68y/o male with PMH of DM, htn, HLD, prostate cancer admitted with perforated appendicitis with cecal mass. Had exploratory laparoscopy and open right hemicolectomy w/ ileocecal anastomosis POD#2. Patient is currently not in any pain. PAtient reports that the nausea and emesis has resolved. He has been using the incentive spirometry and wants his diet advanced and plans on walking today. He has not passed gas and urine output is adequate. Pt denies CP, SOB, N/V, fever, chills.     MEDICATIONS  (STANDING):  atorvastatin 40 milliGRAM(s) Oral at bedtime  dextrose 5%. 1000 milliLiter(s) (100 mL/Hr) IV Continuous <Continuous>  dextrose 5%. 1000 milliLiter(s) (50 mL/Hr) IV Continuous <Continuous>  dextrose 50% Injectable 25 Gram(s) IV Push once  dextrose 50% Injectable 25 Gram(s) IV Push once  dextrose 50% Injectable 12.5 Gram(s) IV Push once  glucagon  Injectable 1 milliGRAM(s) IntraMuscular once  heparin   Injectable 5000 Unit(s) SubCutaneous every 8 hours  influenza  Vaccine (HIGH DOSE) 0.5 milliLiter(s) IntraMuscular once  insulin glargine Injectable (LANTUS) 15 Unit(s) SubCutaneous at bedtime  insulin lispro (ADMELOG) corrective regimen sliding scale   SubCutaneous every 6 hours  lactated ringers. 1000 milliLiter(s) (100 mL/Hr) IV Continuous <Continuous>  lisinopril 40 milliGRAM(s) Oral daily  metoprolol tartrate 25 milliGRAM(s) Oral every 12 hours  pantoprazole  Injectable 40 milliGRAM(s) IV Push daily  piperacillin/tazobactam IVPB.. 3.375 Gram(s) IV Intermittent every 8 hours    MEDICATIONS  (PRN):  dextrose Oral Gel 15 Gram(s) Oral once PRN Blood Glucose LESS THAN 70 milliGRAM(s)/deciliter  ketorolac   Injectable 15 milliGRAM(s) IV Push every 6 hours PRN break through pain  ondansetron Injectable 4 milliGRAM(s) IV Push every 6 hours PRN Nausea and/or Vomiting  oxyCODONE    IR 10 milliGRAM(s) Oral every 4 hours PRN Severe Pain (7 - 10)  oxyCODONE    IR 5 milliGRAM(s) Oral every 4 hours PRN Moderate Pain (4 - 6)      Vital Signs Last 24 Hrs  T(C): 36.7 (09 Oct 2024 04:30), Max: 36.9 (08 Oct 2024 08:09)  T(F): 98.1 (09 Oct 2024 04:30), Max: 98.5 (08 Oct 2024 21:00)  HR: 71 (09 Oct 2024 04:30) (64 - 85)  BP: 177/76 (09 Oct 2024 04:30) (111/56 - 192/65)  BP(mean): --  RR: 18 (09 Oct 2024 04:30) (17 - 20)  SpO2: 94% (09 Oct 2024 04:30) (90% - 96%)    Parameters below as of 09 Oct 2024 04:30  Patient On (Oxygen Delivery Method): room air        Physical Exam  Constitutional: patient appears comfortable resting in bed, in no apparent distress  Respiratory: respirations are unlabored, no accessory muscle use, no conversational dyspnea  Cardiovascular: regular rate & rhythm  Gastrointestinal: abdomen is soft & non-distended, non-tender, no rebound tenderness / guarding. Dressing is clean and intact, prevena in place with good suction.   Neurological: GCS15, A&O x 4  Skin: mucous membranes moist, no diaphoresis, pallor, cyanosis or jaundice      I&O's Detail    08 Oct 2024 07:01  -  09 Oct 2024 07:00  --------------------------------------------------------  IN:    Lactated Ringers: 1000 mL  Total IN: 1000 mL    OUT:    Indwelling Catheter - Urethral (mL): 350 mL    Voided (mL): 700 mL  Total OUT: 1050 mL    Total NET: -50 mL          LABS:                        9.3    8.89  )-----------( 340      ( 09 Oct 2024 05:10 )             29.6     10-09    138  |  105  |  17.3  ----------------------------<  164[H]  3.8   |  22.0  |  1.13    Ca    8.4      09 Oct 2024 05:10  Phos  1.5     10-09  Mg     2.0     10-09        Urinalysis Basic - ( 09 Oct 2024 05:10 )    Color: x / Appearance: x / SG: x / pH: x  Gluc: 164 mg/dL / Ketone: x  / Bili: x / Urobili: x   Blood: x / Protein: x / Nitrite: x   Leuk Esterase: x / RBC: x / WBC x   Sq Epi: x / Non Sq Epi: x / Bacteria: x

## 2024-10-09 NOTE — PROGRESS NOTE ADULT - SUBJECTIVE AND OBJECTIVE BOX
INTERVAL EVENTS:  Follow up diabetes management. Advanced to clear liquid diet this morning. Lantus restarted last night, glucose improved this morning. Denies acute pain.     MEDICATIONS  (STANDING):  atorvastatin 40 milliGRAM(s) Oral at bedtime  dextrose 5%. 1000 milliLiter(s) (100 mL/Hr) IV Continuous <Continuous>  dextrose 5%. 1000 milliLiter(s) (50 mL/Hr) IV Continuous <Continuous>  dextrose 50% Injectable 25 Gram(s) IV Push once  dextrose 50% Injectable 25 Gram(s) IV Push once  dextrose 50% Injectable 12.5 Gram(s) IV Push once  glucagon  Injectable 1 milliGRAM(s) IntraMuscular once  heparin   Injectable 5000 Unit(s) SubCutaneous every 8 hours  influenza  Vaccine (HIGH DOSE) 0.5 milliLiter(s) IntraMuscular once  insulin glargine Injectable (LANTUS) 15 Unit(s) SubCutaneous at bedtime  insulin lispro (ADMELOG) corrective regimen sliding scale   SubCutaneous every 6 hours  lactated ringers. 1000 milliLiter(s) (100 mL/Hr) IV Continuous <Continuous>  lisinopril 40 milliGRAM(s) Oral daily  metoprolol tartrate 25 milliGRAM(s) Oral every 12 hours  pantoprazole  Injectable 40 milliGRAM(s) IV Push daily  piperacillin/tazobactam IVPB.. 3.375 Gram(s) IV Intermittent every 8 hours  potassium phosphate / sodium phosphate Powder (PHOS-NaK) 1 Packet(s) Oral three times a day    MEDICATIONS  (PRN):  dextrose Oral Gel 15 Gram(s) Oral once PRN Blood Glucose LESS THAN 70 milliGRAM(s)/deciliter  ketorolac   Injectable 15 milliGRAM(s) IV Push every 6 hours PRN break through pain  ondansetron Injectable 4 milliGRAM(s) IV Push every 6 hours PRN Nausea and/or Vomiting  oxyCODONE    IR 10 milliGRAM(s) Oral every 4 hours PRN Severe Pain (7 - 10)  oxyCODONE    IR 5 milliGRAM(s) Oral every 4 hours PRN Moderate Pain (4 - 6)    Allergies  shellfish (Other; Rash)  No Known Drug Allergies    Vital Signs Last 24 Hrs  T(C): 36.7 (09 Oct 2024 04:30), Max: 36.9 (08 Oct 2024 21:00)  T(F): 98.1 (09 Oct 2024 04:30), Max: 98.5 (08 Oct 2024 21:00)  HR: 71 (09 Oct 2024 04:30) (64 - 75)  BP: 177/76 (09 Oct 2024 04:30) (111/56 - 192/65)  BP(mean): --  RR: 18 (09 Oct 2024 04:30) (17 - 18)  SpO2: 94% (09 Oct 2024 04:30) (93% - 96%)    Parameters below as of 09 Oct 2024 04:30  Patient On (Oxygen Delivery Method): room air    PHYSICAL EXAM:  General: No apparent distress  Neck: Supple, trachea midline, no thyromegaly  Respiratory: Lungs clear bilaterally, normal rate, effort  Cardiac: +S1, S2, no m/r/g  GI: +BS, soft, non tender, non distended  Extremities: No peripheral edema, no pedal lesions  Neuro: A+O X3    LABS:                        9.3    8.89  )-----------( 340      ( 09 Oct 2024 05:10 )             29.6     10-09    138  |  105  |  17.3  ----------------------------<  164[H]  3.8   |  22.0  |  1.13    Ca    8.4      09 Oct 2024 05:10  Phos  1.5     10-09  Mg     2.0     10-09      Urinalysis Basic - ( 09 Oct 2024 05:10 )    Color: x / Appearance: x / SG: x / pH: x  Gluc: 164 mg/dL / Ketone: x  / Bili: x / Urobili: x   Blood: x / Protein: x / Nitrite: x   Leuk Esterase: x / RBC: x / WBC x   Sq Epi: x / Non Sq Epi: x / Bacteria: x    POCT Blood Glucose.: 158 mg/dL (10-09-24 @ 07:59)  POCT Blood Glucose.: 176 mg/dL (10-09-24 @ 05:23)  POCT Blood Glucose.: 213 mg/dL (10-09-24 @ 02:17)  POCT Blood Glucose.: 220 mg/dL (10-08-24 @ 21:53)  POCT Blood Glucose.: 228 mg/dL (10-08-24 @ 17:19)

## 2024-10-09 NOTE — PROGRESS NOTE ADULT - ASSESSMENT
Pt admitted for ruptured appendix with cecal mass had diagnostic laproscopy with open r. hemicolectomy POD#2    Plan:  Continue zosyn  Get pt walking  clear liquid diet  maintain pain control  monitor blood pressure  resume aspirin Pt admitted for ruptured appendix with cecal mass had diagnostic laproscopy with open r. hemicolectomy POD#2.  Patient is progressing appropriately, nausea has improved and ready to CLD.      Plan:  Continue zosyn  Get pt walking  clear liquid diet today   maintain pain control  monitor blood pressure, resuming all BP medication today  resume home aspirin  DVT ppx  Monitor diet tolerance and likely advance when ROBF

## 2024-10-10 LAB
ANION GAP SERPL CALC-SCNC: 12 MMOL/L — SIGNIFICANT CHANGE UP (ref 5–17)
BUN SERPL-MCNC: 14.1 MG/DL — SIGNIFICANT CHANGE UP (ref 8–20)
CALCIUM SERPL-MCNC: 7.8 MG/DL — LOW (ref 8.4–10.5)
CHLORIDE SERPL-SCNC: 105 MMOL/L — SIGNIFICANT CHANGE UP (ref 96–108)
CO2 SERPL-SCNC: 21 MMOL/L — LOW (ref 22–29)
CREAT SERPL-MCNC: 1.02 MG/DL — SIGNIFICANT CHANGE UP (ref 0.5–1.3)
EGFR: 79 ML/MIN/1.73M2 — SIGNIFICANT CHANGE UP
GLUCOSE BLDC GLUCOMTR-MCNC: 220 MG/DL — HIGH (ref 70–99)
GLUCOSE BLDC GLUCOMTR-MCNC: 223 MG/DL — HIGH (ref 70–99)
GLUCOSE BLDC GLUCOMTR-MCNC: 236 MG/DL — HIGH (ref 70–99)
GLUCOSE BLDC GLUCOMTR-MCNC: 237 MG/DL — HIGH (ref 70–99)
GLUCOSE SERPL-MCNC: 210 MG/DL — HIGH (ref 70–99)
HCT VFR BLD CALC: 27.1 % — LOW (ref 39–50)
HGB BLD-MCNC: 8.5 G/DL — LOW (ref 13–17)
MAGNESIUM SERPL-MCNC: 1.9 MG/DL — SIGNIFICANT CHANGE UP (ref 1.6–2.6)
MCHC RBC-ENTMCNC: 24.9 PG — LOW (ref 27–34)
MCHC RBC-ENTMCNC: 31.4 GM/DL — LOW (ref 32–36)
MCV RBC AUTO: 79.5 FL — LOW (ref 80–100)
PHOSPHATE SERPL-MCNC: 1.8 MG/DL — LOW (ref 2.4–4.7)
PLATELET # BLD AUTO: 328 K/UL — SIGNIFICANT CHANGE UP (ref 150–400)
POTASSIUM SERPL-MCNC: 3.9 MMOL/L — SIGNIFICANT CHANGE UP (ref 3.5–5.3)
POTASSIUM SERPL-SCNC: 3.9 MMOL/L — SIGNIFICANT CHANGE UP (ref 3.5–5.3)
RBC # BLD: 3.41 M/UL — LOW (ref 4.2–5.8)
RBC # FLD: 15.9 % — HIGH (ref 10.3–14.5)
SODIUM SERPL-SCNC: 138 MMOL/L — SIGNIFICANT CHANGE UP (ref 135–145)
WBC # BLD: 7.14 K/UL — SIGNIFICANT CHANGE UP (ref 3.8–10.5)
WBC # FLD AUTO: 7.14 K/UL — SIGNIFICANT CHANGE UP (ref 3.8–10.5)

## 2024-10-10 PROCEDURE — 99231 SBSQ HOSP IP/OBS SF/LOW 25: CPT

## 2024-10-10 RX ORDER — HYDRALAZINE HYDROCHLORIDE 100 MG/1
5 TABLET ORAL ONCE
Refills: 0 | Status: COMPLETED | OUTPATIENT
Start: 2024-10-10 | End: 2024-10-10

## 2024-10-10 RX ORDER — INSULIN GLARGINE 300 U/ML
18 INJECTION, SOLUTION SUBCUTANEOUS AT BEDTIME
Refills: 0 | Status: DISCONTINUED | OUTPATIENT
Start: 2024-10-10 | End: 2024-10-11

## 2024-10-10 RX ORDER — INSULIN LISPRO 100/ML
5 VIAL (ML) SUBCUTANEOUS
Refills: 0 | Status: DISCONTINUED | OUTPATIENT
Start: 2024-10-10 | End: 2024-10-11

## 2024-10-10 RX ORDER — INSULIN LISPRO 100/ML
VIAL (ML) SUBCUTANEOUS
Refills: 0 | Status: DISCONTINUED | OUTPATIENT
Start: 2024-10-10 | End: 2024-10-11

## 2024-10-10 RX ORDER — AMLODIPINE BESYLATE 5 MG
5 TABLET ORAL DAILY
Refills: 0 | Status: DISCONTINUED | OUTPATIENT
Start: 2024-10-10 | End: 2024-10-11

## 2024-10-10 RX ADMIN — INSULIN GLARGINE 18 UNIT(S): 300 INJECTION, SOLUTION SUBCUTANEOUS at 21:47

## 2024-10-10 RX ADMIN — Medication 5 MILLIGRAM(S): at 08:42

## 2024-10-10 RX ADMIN — ATORVASTATIN CALCIUM 40 MILLIGRAM(S): 10 TABLET, FILM COATED ORAL at 21:38

## 2024-10-10 RX ADMIN — Medication 100 MILLILITER(S): at 05:09

## 2024-10-10 RX ADMIN — Medication 5000 UNIT(S): at 21:38

## 2024-10-10 RX ADMIN — Medication 5 UNIT(S): at 11:44

## 2024-10-10 RX ADMIN — Medication 5000 UNIT(S): at 05:09

## 2024-10-10 RX ADMIN — PIPERACILLIN SODIUM AND TAZOBACTAM SODIUM 25 GRAM(S): 12; 1.5 INJECTION, POWDER, LYOPHILIZED, FOR SOLUTION INTRAVENOUS at 05:09

## 2024-10-10 RX ADMIN — Medication 4: at 17:08

## 2024-10-10 RX ADMIN — Medication 4: at 11:43

## 2024-10-10 RX ADMIN — Medication 25 MILLIGRAM(S): at 17:08

## 2024-10-10 RX ADMIN — Medication 5 UNIT(S): at 17:08

## 2024-10-10 RX ADMIN — Medication 25 MILLIGRAM(S): at 05:10

## 2024-10-10 RX ADMIN — PIPERACILLIN SODIUM AND TAZOBACTAM SODIUM 25 GRAM(S): 12; 1.5 INJECTION, POWDER, LYOPHILIZED, FOR SOLUTION INTRAVENOUS at 21:47

## 2024-10-10 RX ADMIN — Medication 40 MILLIGRAM(S): at 05:10

## 2024-10-10 RX ADMIN — Medication 100 MILLILITER(S): at 21:38

## 2024-10-10 RX ADMIN — Medication 4: at 08:14

## 2024-10-10 RX ADMIN — HYDRALAZINE HYDROCHLORIDE 5 MILLIGRAM(S): 100 TABLET ORAL at 14:40

## 2024-10-10 RX ADMIN — Medication 1 PACKET(S): at 05:09

## 2024-10-10 RX ADMIN — OXYCODONE HYDROCHLORIDE 5 MILLIGRAM(S): 30 TABLET, FILM COATED, EXTENDED RELEASE ORAL at 06:32

## 2024-10-10 RX ADMIN — OXYCODONE HYDROCHLORIDE 5 MILLIGRAM(S): 30 TABLET, FILM COATED, EXTENDED RELEASE ORAL at 05:32

## 2024-10-10 NOTE — PROGRESS NOTE ADULT - SUBJECTIVE AND OBJECTIVE BOX
INTERVAL EVENTS:  Follow up diabetes management. Diet advanced to full liquid, tolerating well. Glucoses increasing over last 24 hr with mild hyperglycemia.     MEDICATIONS  (STANDING):  amLODIPine   Tablet 5 milliGRAM(s) Oral daily  aspirin  chewable 81 milliGRAM(s) Oral daily  atorvastatin 40 milliGRAM(s) Oral at bedtime  dextrose 5%. 1000 milliLiter(s) (100 mL/Hr) IV Continuous <Continuous>  dextrose 5%. 1000 milliLiter(s) (50 mL/Hr) IV Continuous <Continuous>  dextrose 50% Injectable 25 Gram(s) IV Push once  dextrose 50% Injectable 12.5 Gram(s) IV Push once  dextrose 50% Injectable 25 Gram(s) IV Push once  glucagon  Injectable 1 milliGRAM(s) IntraMuscular once  heparin   Injectable 5000 Unit(s) SubCutaneous every 8 hours  influenza  Vaccine (HIGH DOSE) 0.5 milliLiter(s) IntraMuscular once  insulin glargine Injectable (LANTUS) 18 Unit(s) SubCutaneous at bedtime  insulin lispro (ADMELOG) corrective regimen sliding scale   SubCutaneous three times a day before meals  insulin lispro Injectable (ADMELOG) 5 Unit(s) SubCutaneous three times a day before meals  lactated ringers. 1000 milliLiter(s) (100 mL/Hr) IV Continuous <Continuous>  lisinopril 40 milliGRAM(s) Oral daily  metoprolol tartrate 25 milliGRAM(s) Oral every 12 hours  pantoprazole  Injectable 40 milliGRAM(s) IV Push daily  piperacillin/tazobactam IVPB.. 3.375 Gram(s) IV Intermittent every 8 hours    MEDICATIONS  (PRN):  ondansetron Injectable 4 milliGRAM(s) IV Push every 6 hours PRN Nausea and/or Vomiting  oxyCODONE    IR 10 milliGRAM(s) Oral every 4 hours PRN Severe Pain (7 - 10)  oxyCODONE    IR 5 milliGRAM(s) Oral every 4 hours PRN Moderate Pain (4 - 6)    Allergies  shellfish (Other; Rash)  No Known Drug Allergies    Vital Signs Last 24 Hrs  T(C): 36.7 (10 Oct 2024 08:06), Max: 37.3 (10 Oct 2024 04:58)  T(F): 98.1 (10 Oct 2024 08:06), Max: 99.1 (10 Oct 2024 04:58)  HR: 70 (10 Oct 2024 08:06) (70 - 81)  BP: 122/63 (10 Oct 2024 08:06) (122/63 - 193/79)  BP(mean): --  RR: 18 (10 Oct 2024 08:06) (18 - 18)  SpO2: 93% (10 Oct 2024 08:06) (93% - 98%)    Parameters below as of 10 Oct 2024 08:06  Patient On (Oxygen Delivery Method): room air    PHYSICAL EXAM:  General: No apparent distress  Respiratory: Lungs clear bilaterally  Cardiac: +S1, S2, no m/r/g  GI: +BS, soft, non tender, non distended  Extremities: No peripheral edema  Neuro: A+O X3    LABS:                        8.5    7.14  )-----------( 328      ( 10 Oct 2024 05:19 )             27.1     10-10    138  |  105  |  14.1  ----------------------------<  210[H]  3.9   |  21.0[L]  |  1.02    Ca    7.8[L]      10 Oct 2024 05:19  Phos  1.8     10-10  Mg     1.9     10-10      Urinalysis Basic - ( 10 Oct 2024 05:19 )    Color: x / Appearance: x / SG: x / pH: x  Gluc: 210 mg/dL / Ketone: x  / Bili: x / Urobili: x   Blood: x / Protein: x / Nitrite: x   Leuk Esterase: x / RBC: x / WBC x   Sq Epi: x / Non Sq Epi: x / Bacteria: x    POCT Blood Glucose.: 223 mg/dL (10-10-24 @ 08:12)  POCT Blood Glucose.: 195 mg/dL (10-09-24 @ 21:25)  POCT Blood Glucose.: 271 mg/dL (10-09-24 @ 18:09)  POCT Blood Glucose.: 205 mg/dL (10-09-24 @ 12:19)

## 2024-10-10 NOTE — PROGRESS NOTE ADULT - ASSESSMENT
Pt admitted for ruptured appendix with cecal mass had diagnostic laproscopy w/ open R. hemicolectomy POD#3. Patient is progressing appropriately.    Plan  -Continue zosyn  -Continue walking  -advance diet to thicker food  -maintain pain control  -monitor blood pressure  -DVT ppx

## 2024-10-10 NOTE — PROGRESS NOTE ADULT - ASSESSMENT
69M obese w/ T2DM, HTN, HLD, CABGx3, prostate removal, presented with a 2 day hx of lower abd pain with subjective fever, found on CT to have perforated appendicitis.    Consulted for diabetes management  Home regimen: mounjaro 12.5mg weekly, metformin 500mg 1 tab BID, novolin n insulin 75/40  Current a1c: 7.7%  Endocrinologist: Dr Wan    1. T2DM- mild hyperglycemia  - Start admelog 5 units TID with meals  - Increase lantus to 18 units qhs  - Moderate correction scale  - POCT goal 120-180    2. Cecal mass, ruptured appendix  - S/p diagnostic laparoscopy, open right hemicolectomy  - Pain control  - Care per surgical team    3. HLD  - Continue statin

## 2024-10-10 NOTE — PROGRESS NOTE ADULT - SUBJECTIVE AND OBJECTIVE BOX
SUBJECTIVE / 24H EVENTS:    Pt seen and examined at bedside by the team during rounds. Mr Reynolds is a 70y/o male with PMHx of DM, htn, HLD, prostate cancer admitted with perforated appendicitis with cecal mass. Had an exploratory laparoscopy and right open hemicolectomy w/ ileocecal anastomosis POD#3. Patient is currently not in any pain but does admit to some pain when he moves and had some pain that was relieved with urination. This morning pt had a BP of 183/71 and was given 5mg of hydralazine. Pt denies gas or bowel movements. Pt denies CP, SOB, N/V, fever, chills.     MEDICATIONS  (STANDING):  amLODIPine   Tablet 5 milliGRAM(s) Oral daily  aspirin  chewable 81 milliGRAM(s) Oral daily  atorvastatin 40 milliGRAM(s) Oral at bedtime  dextrose 5%. 1000 milliLiter(s) (100 mL/Hr) IV Continuous <Continuous>  dextrose 5%. 1000 milliLiter(s) (50 mL/Hr) IV Continuous <Continuous>  dextrose 50% Injectable 25 Gram(s) IV Push once  dextrose 50% Injectable 25 Gram(s) IV Push once  dextrose 50% Injectable 12.5 Gram(s) IV Push once  glucagon  Injectable 1 milliGRAM(s) IntraMuscular once  heparin   Injectable 5000 Unit(s) SubCutaneous every 8 hours  influenza  Vaccine (HIGH DOSE) 0.5 milliLiter(s) IntraMuscular once  insulin glargine Injectable (LANTUS) 15 Unit(s) SubCutaneous at bedtime  insulin lispro (ADMELOG) corrective regimen sliding scale   SubCutaneous four times a day before meals  lactated ringers. 1000 milliLiter(s) (100 mL/Hr) IV Continuous <Continuous>  lisinopril 40 milliGRAM(s) Oral daily  metoprolol tartrate 25 milliGRAM(s) Oral every 12 hours  pantoprazole  Injectable 40 milliGRAM(s) IV Push daily  piperacillin/tazobactam IVPB.. 3.375 Gram(s) IV Intermittent every 8 hours    MEDICATIONS  (PRN):  ondansetron Injectable 4 milliGRAM(s) IV Push every 6 hours PRN Nausea and/or Vomiting  oxyCODONE    IR 5 milliGRAM(s) Oral every 4 hours PRN Moderate Pain (4 - 6)  oxyCODONE    IR 10 milliGRAM(s) Oral every 4 hours PRN Severe Pain (7 - 10)      Vital Signs Last 24 Hrs  T(C): 37.3 (10 Oct 2024 04:58), Max: 37.4 (09 Oct 2024 08:40)  T(F): 99.1 (10 Oct 2024 04:58), Max: 99.3 (09 Oct 2024 08:40)  HR: 73 (10 Oct 2024 04:58) (68 - 81)  BP: 183/71 (10 Oct 2024 04:58) (110/60 - 193/79)  BP(mean): --  RR: 18 (10 Oct 2024 04:58) (18 - 18)  SpO2: 96% (10 Oct 2024 04:58) (93% - 98%)    Parameters below as of 10 Oct 2024 04:58  Patient On (Oxygen Delivery Method): room air        Physical Exam  Constitutional: patient appears comfortable resting in bed, in no apparent distress  Respiratory: respirations are unlabored, no accessory muscle use, no conversational dyspnea  Cardiovascular: regular rate & rhythm  Gastrointestinal: abdomen is soft & non-distended, non-tender, no rebound tenderness / guarding  Neurological: GCS15, A&O x 3  Skin: mucous membranes moist, no diaphoresis, pallor, cyanosis or jaundice      I&O's Detail    09 Oct 2024 07:01  -  10 Oct 2024 07:00  --------------------------------------------------------  IN:    Lactated Ringers: 700 mL    Oral Fluid: 240 mL  Total IN: 940 mL    OUT:    Voided (mL): 1950 mL  Total OUT: 1950 mL    Total NET: -1010 mL          LABS:                        8.5    7.14  )-----------( 328      ( 10 Oct 2024 05:19 )             27.1     10-10    138  |  105  |  14.1  ----------------------------<  210[H]  3.9   |  21.0[L]  |  1.02    Ca    7.8[L]      10 Oct 2024 05:19  Phos  1.8     10-10  Mg     1.9     10-10        Urinalysis Basic - ( 10 Oct 2024 05:19 )    Color: x / Appearance: x / SG: x / pH: x  Gluc: 210 mg/dL / Ketone: x  / Bili: x / Urobili: x   Blood: x / Protein: x / Nitrite: x   Leuk Esterase: x / RBC: x / WBC x   Sq Epi: x / Non Sq Epi: x / Bacteria: x

## 2024-10-11 ENCOUNTER — TRANSCRIPTION ENCOUNTER (OUTPATIENT)
Age: 69
End: 2024-10-11

## 2024-10-11 VITALS
TEMPERATURE: 98 F | OXYGEN SATURATION: 96 % | DIASTOLIC BLOOD PRESSURE: 60 MMHG | SYSTOLIC BLOOD PRESSURE: 162 MMHG | RESPIRATION RATE: 16 BRPM | HEART RATE: 62 BPM

## 2024-10-11 LAB
ANION GAP SERPL CALC-SCNC: 12 MMOL/L — SIGNIFICANT CHANGE UP (ref 5–17)
BUN SERPL-MCNC: 12.4 MG/DL — SIGNIFICANT CHANGE UP (ref 8–20)
CALCIUM SERPL-MCNC: 8.5 MG/DL — SIGNIFICANT CHANGE UP (ref 8.4–10.5)
CHLORIDE SERPL-SCNC: 105 MMOL/L — SIGNIFICANT CHANGE UP (ref 96–108)
CO2 SERPL-SCNC: 23 MMOL/L — SIGNIFICANT CHANGE UP (ref 22–29)
CREAT SERPL-MCNC: 0.99 MG/DL — SIGNIFICANT CHANGE UP (ref 0.5–1.3)
EGFR: 82 ML/MIN/1.73M2 — SIGNIFICANT CHANGE UP
GLUCOSE BLDC GLUCOMTR-MCNC: 217 MG/DL — HIGH (ref 70–99)
GLUCOSE BLDC GLUCOMTR-MCNC: 220 MG/DL — HIGH (ref 70–99)
GLUCOSE BLDC GLUCOMTR-MCNC: 226 MG/DL — HIGH (ref 70–99)
GLUCOSE SERPL-MCNC: 214 MG/DL — HIGH (ref 70–99)
HCT VFR BLD CALC: 29.8 % — LOW (ref 39–50)
HGB BLD-MCNC: 9.5 G/DL — LOW (ref 13–17)
MAGNESIUM SERPL-MCNC: 2 MG/DL — SIGNIFICANT CHANGE UP (ref 1.6–2.6)
MCHC RBC-ENTMCNC: 25.4 PG — LOW (ref 27–34)
MCHC RBC-ENTMCNC: 31.9 GM/DL — LOW (ref 32–36)
MCV RBC AUTO: 79.7 FL — LOW (ref 80–100)
PHOSPHATE SERPL-MCNC: 2.4 MG/DL — SIGNIFICANT CHANGE UP (ref 2.4–4.7)
PLATELET # BLD AUTO: 386 K/UL — SIGNIFICANT CHANGE UP (ref 150–400)
POTASSIUM SERPL-MCNC: 4.1 MMOL/L — SIGNIFICANT CHANGE UP (ref 3.5–5.3)
POTASSIUM SERPL-SCNC: 4.1 MMOL/L — SIGNIFICANT CHANGE UP (ref 3.5–5.3)
RBC # BLD: 3.74 M/UL — LOW (ref 4.2–5.8)
RBC # FLD: 15.9 % — HIGH (ref 10.3–14.5)
SODIUM SERPL-SCNC: 140 MMOL/L — SIGNIFICANT CHANGE UP (ref 135–145)
SURGICAL PATHOLOGY STUDY: SIGNIFICANT CHANGE UP
WBC # BLD: 6.86 K/UL — SIGNIFICANT CHANGE UP (ref 3.8–10.5)
WBC # FLD AUTO: 6.86 K/UL — SIGNIFICANT CHANGE UP (ref 3.8–10.5)

## 2024-10-11 PROCEDURE — 99231 SBSQ HOSP IP/OBS SF/LOW 25: CPT

## 2024-10-11 RX ORDER — INSULIN GLARGINE 300 U/ML
22 INJECTION, SOLUTION SUBCUTANEOUS AT BEDTIME
Refills: 0 | Status: DISCONTINUED | OUTPATIENT
Start: 2024-10-11 | End: 2024-10-11

## 2024-10-11 RX ORDER — INSULIN LISPRO 100/ML
8 VIAL (ML) SUBCUTANEOUS
Refills: 0 | Status: DISCONTINUED | OUTPATIENT
Start: 2024-10-11 | End: 2024-10-11

## 2024-10-11 RX ORDER — SODIUM PHOSPHATE IN D5W 15MMOL/250
30 PLASTIC BAG, INJECTION (ML) INTRAVENOUS ONCE
Refills: 0 | Status: COMPLETED | OUTPATIENT
Start: 2024-10-11 | End: 2024-10-11

## 2024-10-11 RX ADMIN — Medication 25 MILLIGRAM(S): at 05:18

## 2024-10-11 RX ADMIN — Medication 4: at 09:09

## 2024-10-11 RX ADMIN — PIPERACILLIN SODIUM AND TAZOBACTAM SODIUM 25 GRAM(S): 12; 1.5 INJECTION, POWDER, LYOPHILIZED, FOR SOLUTION INTRAVENOUS at 05:19

## 2024-10-11 RX ADMIN — Medication 8 UNIT(S): at 12:31

## 2024-10-11 RX ADMIN — Medication 81 MILLIGRAM(S): at 09:08

## 2024-10-11 RX ADMIN — Medication 40 MILLIGRAM(S): at 05:18

## 2024-10-11 RX ADMIN — Medication 5 UNIT(S): at 09:09

## 2024-10-11 RX ADMIN — Medication 5 MILLIGRAM(S): at 05:18

## 2024-10-11 RX ADMIN — Medication 4: at 17:35

## 2024-10-11 RX ADMIN — Medication 5000 UNIT(S): at 05:19

## 2024-10-11 RX ADMIN — Medication 100 MILLILITER(S): at 05:19

## 2024-10-11 RX ADMIN — Medication 85 MILLIMOLE(S): at 12:31

## 2024-10-11 RX ADMIN — Medication 4: at 12:30

## 2024-10-11 RX ADMIN — PIPERACILLIN SODIUM AND TAZOBACTAM SODIUM 25 GRAM(S): 12; 1.5 INJECTION, POWDER, LYOPHILIZED, FOR SOLUTION INTRAVENOUS at 13:28

## 2024-10-11 RX ADMIN — Medication 5000 UNIT(S): at 13:28

## 2024-10-11 RX ADMIN — PANTOPRAZOLE SODIUM 40 MILLIGRAM(S): 40 TABLET, DELAYED RELEASE ORAL at 09:13

## 2024-10-11 RX ADMIN — Medication 25 MILLIGRAM(S): at 17:36

## 2024-10-11 RX ADMIN — Medication 8 UNIT(S): at 17:36

## 2024-10-11 NOTE — DISCHARGE NOTE PROVIDER - NSDCCPTREATMENT_GEN_ALL_CORE_FT
PRINCIPAL PROCEDURE  Procedure: Right hemicolectomy  Findings and Treatment: -can take Tylenol 975mg every 6 hours as needed for pain  -continue home medications  -can resume regular diet  -follow-up in surgery office with Dr. Martinez in 2 weeks, can call office to make appointment and with questions or concerns  -if development of severe pain, intractable nausea/vomiting, present to ED

## 2024-10-11 NOTE — PROGRESS NOTE ADULT - SUBJECTIVE AND OBJECTIVE BOX
INTERVAL EVENTS:  Follow up diabetes management. Glucoses >200 over last 24 hrs. Endorses good appetite, diet will possibly be advanced today by primary team as per pt.    MEDICATIONS  (STANDING):  amLODIPine   Tablet 5 milliGRAM(s) Oral daily  aspirin  chewable 81 milliGRAM(s) Oral daily  atorvastatin 40 milliGRAM(s) Oral at bedtime  dextrose 5%. 1000 milliLiter(s) (100 mL/Hr) IV Continuous <Continuous>  dextrose 5%. 1000 milliLiter(s) (50 mL/Hr) IV Continuous <Continuous>  dextrose 50% Injectable 25 Gram(s) IV Push once  dextrose 50% Injectable 25 Gram(s) IV Push once  dextrose 50% Injectable 12.5 Gram(s) IV Push once  glucagon  Injectable 1 milliGRAM(s) IntraMuscular once  heparin   Injectable 5000 Unit(s) SubCutaneous every 8 hours  influenza  Vaccine (HIGH DOSE) 0.5 milliLiter(s) IntraMuscular once  insulin glargine Injectable (LANTUS) 22 Unit(s) SubCutaneous at bedtime  insulin lispro (ADMELOG) corrective regimen sliding scale   SubCutaneous three times a day before meals  insulin lispro Injectable (ADMELOG) 8 Unit(s) SubCutaneous three times a day before meals  lactated ringers. 1000 milliLiter(s) (100 mL/Hr) IV Continuous <Continuous>  lisinopril 40 milliGRAM(s) Oral daily  metoprolol tartrate 25 milliGRAM(s) Oral every 12 hours  pantoprazole  Injectable 40 milliGRAM(s) IV Push daily  piperacillin/tazobactam IVPB.. 3.375 Gram(s) IV Intermittent every 8 hours  sodium phosphate 30 milliMole(s)/500 mL IVPB 30 milliMole(s) IV Intermittent once    MEDICATIONS  (PRN):  ondansetron Injectable 4 milliGRAM(s) IV Push every 6 hours PRN Nausea and/or Vomiting  oxyCODONE    IR 10 milliGRAM(s) Oral every 4 hours PRN Severe Pain (7 - 10)  oxyCODONE    IR 5 milliGRAM(s) Oral every 4 hours PRN Moderate Pain (4 - 6)    Allergies  shellfish (Other; Rash)  No Known Drug Allergies    Vital Signs Last 24 Hrs  T(C): 36.8 (11 Oct 2024 08:12), Max: 37.1 (11 Oct 2024 05:14)  T(F): 98.2 (11 Oct 2024 08:12), Max: 98.7 (11 Oct 2024 05:14)  HR: 63 (11 Oct 2024 08:12) (61 - 76)  BP: 173/71 (11 Oct 2024 08:12) (137/64 - 200/82)  BP(mean): --  RR: 14 (11 Oct 2024 08:12) (14 - 18)  SpO2: 94% (11 Oct 2024 08:12) (93% - 94%)    Parameters below as of 11 Oct 2024 08:12  Patient On (Oxygen Delivery Method): room air    PHYSICAL EXAM:  General: No apparent distress  Respiratory: Lungs clear bilaterally  Cardiac: +S1, S2, no m/r/g  GI: +BS, soft, non tender, non distended  Extremities: No peripheral edema  Neuro: A+O X3    LABS:                        9.5    6.86  )-----------( 386      ( 11 Oct 2024 05:28 )             29.8     10-11    140  |  105  |  12.4  ----------------------------<  214[H]  4.1   |  23.0  |  0.99    Ca    8.5      11 Oct 2024 05:28  Phos  2.4     10-11  Mg     2.0     10-11      Urinalysis Basic - ( 11 Oct 2024 05:28 )    Color: x / Appearance: x / SG: x / pH: x  Gluc: 214 mg/dL / Ketone: x  / Bili: x / Urobili: x   Blood: x / Protein: x / Nitrite: x   Leuk Esterase: x / RBC: x / WBC x   Sq Epi: x / Non Sq Epi: x / Bacteria: x    POCT Blood Glucose.: 226 mg/dL (10-11-24 @ 08:43)  POCT Blood Glucose.: 237 mg/dL (10-10-24 @ 21:41)  POCT Blood Glucose.: 220 mg/dL (10-10-24 @ 17:06)  POCT Blood Glucose.: 236 mg/dL (10-10-24 @ 11:41)

## 2024-10-11 NOTE — DIETITIAN INITIAL EVALUATION ADULT - ADD RECOMMEND
Continue current diet as tolerated   Change Ensure Plus HP to Ensure Max for BG control (150 kcals, 1 g sugar, 30 g pro each)  Monitor nutrition related labs, weight trends, BMs and I/Os  Reinforce diet education as needed

## 2024-10-11 NOTE — DIETITIAN INITIAL EVALUATION ADULT - ORAL INTAKE PTA/DIET HISTORY
Met with pt and his family at bedside this afternoon. Pt reports good appetite, observed plate waste, consumed ~75% of lunch. Tolerating well, no c/o N/V/C/D or chewing/swallowing difficulties. Allergy to shellfish noted. Pt states he has been losing weight x 1 year, about 40 lbs since starting Mounjaro. Most recent weight prior to admission was 244 lbs. BW 10/9 240 lbs. Pt has been having taste changes, discussed using alternative seasonings to enhance flavors of food. Provided low fiber and consistent CHO diet education and handout. Addressed nutrition related questions and concerns. Pt receptive, with good understanding. Made aware RD remains available if needed.

## 2024-10-11 NOTE — PROGRESS NOTE ADULT - NS ATTEND AMEND GEN_ALL_CORE FT
I have seen and examined patient with NP, agree with above assessment and plan.

## 2024-10-11 NOTE — DISCHARGE NOTE PROVIDER - NSDCMRMEDTOKEN_GEN_ALL_CORE_FT
acetaminophen-oxyCODONE 325 mg-5 mg oral tablet: 1 tab(s) orally every 4 hours, As needed, Moderate Pain  Aldactone 25 mg oral tablet: 2 tab(s) orally once a day  aspirin 81 mg oral tablet: orally once a day  atorvastatin 40 mg oral tablet: 1 tab(s) orally once a day (at bedtime)  ezetimibe 10 mg oral tablet: 1 tab(s) orally once a day  Lasix 40 mg oral tablet: 1 tab(s) orally once a day  metFORMIN 1000 mg oral tablet: 1 tab(s) orally 2 times a day (with meals)  metoprolol tartrate 25 mg oral tablet: 1 tab(s) orally every 12 hours  NovoLIN N 100 units/mL subcutaneous suspension: 120 unit(s) subcutaneous 2 times a day  ramipril 10 mg oral capsule: 1 cap(s) orally once a day   acetaminophen-oxyCODONE 325 mg-5 mg oral tablet: 1 tab(s) orally every 4 hours, As needed, Moderate Pain  Aldactone 25 mg oral tablet: 2 tab(s) orally once a day  amoxicillin-clavulanate 875 mg-125 mg oral tablet: 875 milligram(s) orally 2 times a day  aspirin 81 mg oral tablet: orally once a day  ezetimibe 10 mg oral tablet: 1 tab(s) orally once a day  Lasix 40 mg oral tablet: 1 tab(s) orally once a day  metFORMIN 1000 mg oral tablet: 1 tab(s) orally 2 times a day (with meals)  metoprolol tartrate 25 mg oral tablet: 1 tab(s) orally every 12 hours  NovoLIN N 100 units/mL subcutaneous suspension: 120 unit(s) subcutaneous 2 times a day  ramipril 10 mg oral capsule: 1 cap(s) orally once a day

## 2024-10-11 NOTE — DIETITIAN INITIAL EVALUATION ADULT - OTHER INFO
70 y/o male with PMH of DM, htn, HLD, prostate cancer admitted with perforated appendicitis with cecal mass. Had exploratory laparoscopy and open right hemicolectomy w/ ileocecal anastomosis on 10/7.

## 2024-10-11 NOTE — DIETITIAN INITIAL EVALUATION ADULT - PERTINENT MEDS FT
MEDICATIONS  (STANDING):  dextrose 5%. 1000 milliLiter(s) (50 mL/Hr) IV Continuous <Continuous>  insulin glargine Injectable (LANTUS) 22 Unit(s) SubCutaneous at bedtime  insulin lispro (ADMELOG) corrective regimen sliding scale   SubCutaneous three times a day before meals  lactated ringers. 1000 milliLiter(s) (100 mL/Hr) IV Continuous <Continuous>  pantoprazole  Injectable 40 milliGRAM(s) IV Push daily    MEDICATIONS  (PRN):  ondansetron Injectable 4 milliGRAM(s) IV Push every 6 hours PRN Nausea and/or Vomiting  oxyCODONE    IR 5 milliGRAM(s) Oral every 4 hours PRN Moderate Pain (4 - 6)

## 2024-10-11 NOTE — DIETITIAN INITIAL EVALUATION ADULT - NS FNS DIET ORDER
Diet, Low Fiber:   Consistent Carbohydrate {Evening Snack} (CSTCHOSN)  Supplement Feeding Modality:  Oral  Ensure Enlive Cans or Servings Per Day:  3       Frequency:  Three Times a day (10-11-24 @ 10:39)

## 2024-10-11 NOTE — DIETITIAN INITIAL EVALUATION ADULT - PERTINENT LABORATORY DATA
10-11    140  |  105  |  12.4  ----------------------------<  214[H]  4.1   |  23.0  |  0.99    Ca    8.5      11 Oct 2024 05:28  Phos  2.4     10-11  Mg     2.0     10-11    POCT Blood Glucose.: 220 mg/dL (10-11-24 @ 12:11)  A1C with Estimated Average Glucose Result: 7.7 % (10-05-24 @ 04:52)

## 2024-10-11 NOTE — PROGRESS NOTE ADULT - ATTENDING COMMENTS
Patient seen and examined at bedside this morning  He is doing well overall, reports small volume emesis this morning, currently denied nausea. No bowel function at this time  Abdomen is softly distended, appropriately tender, Provena in intact and functioning  A/P: Continue with small volume of cleat liquids, do not advance at this time, continue IV fluids, IV antibiotics for intraabdominal infection, follow up for bowel function. Hourly IS use, OOB with ambulation, daily PT. Resume antiplatelet therapy 10/9. Continue supportive care during recovery    Vital Signs Last 24 Hrs  T(C): 36.8 (08 Oct 2024 14:23), Max: 36.9 (08 Oct 2024 08:09)  T(F): 98.3 (08 Oct 2024 14:23), Max: 98.4 (08 Oct 2024 08:09)  HR: 75 (08 Oct 2024 14:23) (63 - 85)  BP: 192/65 (08 Oct 2024 14:23) (146/65 - 192/65)  BP(mean): 90 (08 Oct 2024 00:00) (84 - 107)  RR: 18 (08 Oct 2024 14:23) (12 - 22)  SpO2: 93% (08 Oct 2024 14:23) (90% - 100%)    Parameters below as of 08 Oct 2024 14:23  Patient On (Oxygen Delivery Method): room air                          9.6    9.23  )-----------( 317      ( 08 Oct 2024 06:47 )             30.3   10-08    136  |  102  |  13.4  ----------------------------<  203[H]  4.1   |  18.0[L]  |  0.89    Ca    8.4      08 Oct 2024 06:47  Phos  2.8     10-08  Mg     1.7     10-08
Patient seen and examined this morning.  He was admitted with perforated appendicitis with an associated mass in the cecum.  There is no evidence of metastatic disease on staging workup.  Since admission, he reports improvement in his pain.  No fevers or chills.  On exam, he is in no distress.  Abdomen is soft, nondistended and tender in the right lower abdomen without rebound or guarding.  His white blood cell count is slightly up to 11.5 from 8.  Will continue bowel rest, IV antibiotics and he is scheduled for right colectomy on Monday.  Appreciate cardiology clearance.
Patient seen and examined.  He is status post robotic converted to open right colectomy for perforated appendicitis with associated cecal mass.  He has been doing very well and is tolerating diet and having bowel function.  On exam, his abdomen is soft, nondistended and minimally tender.  Incision is clean and intact with staples.  Plan to discharge home today.  Discharge instructions reviewed      Patient had sepsis on admission  due to perforated appendicitis, resolving/resolved
Patient seen and examined. He was admitted for perforated appendicitis with associated cecal mass. He reports significant improvement in abdominal pain. Has been stable and has no tenderness on my exam. Plan for mechanical and antibiotic bowel prep today (advised to go slow on PO) with plans for right colectomy tomorrow.
Patient seen and examined, chart reviewed.  Feels well, pain very well-controlled, no N/V.  Still no flatus or BM.  Afebrile, vitals WNL, pain controlled.  Abdomen is soft, no significant distention, no rebound/guarding, appropriately tender.  UOP appropriate.  Labs with mild hypomagnesemia to 1.8.    -- Diet - advance as tolerated to low fiber diet, patient encouraged to take this slowly  -- IS, ambulation  -- Multimodal pain control  -- DVT prophylaxis  -- IV fluids until tolerating better PO  -- Awaiting return of bowel function prior to consideration for discharge  -- Continue IV antibiotics in house - likely switch to PO antibiotics upon discharge
SE  No complaints.  Ubaldo clears.  No flatus yet.  AFVSS  NAD  soft, NT, moderate distention  Labs reviewed  A/P -  Can advance to full liquid diet.  Encourage OOB and ambulating.
Patient seen and examined at bedside this afternoon  He is doing well at this time, NPO for operative intervention  Abdomen soft, ND, mild RLQ tenderness  A/P: Robot assisted right hemicolectomy, supportive care post-operative

## 2024-10-11 NOTE — DISCHARGE NOTE PROVIDER - HOSPITAL COURSE
patient is a 70yo male w/ pmh of HTN, HLD, DM, CABGx3, prostate removal (for prostatic cancer) presented with a two day history of lower abdominal pain and subjective fever , on presentation patient complain of lower abdominal pain associated with nausea/ vomitingx2 and diarrhea, reports some recent weight loss last few months , last colonoscopy was 2 years ago (per patient found to have polyps however was benign) , denies family history of colon cancer , denies lightheadedness/dizziness, denies SOB/chest pain. CT scan showed Cecal mass measuring 5.3 x 5.2 cm, concerning for neoplasm. Patient was taken for an open R hemicolectomy on 10/7 without complications. Post operatively pain remained well controlled and diet was advanced as tolerated. Patient was able to tolerate PO intake, ambulate, had return of bowel and bladder function, pain remained well controlled, and remained hemodynamically stable. All lab work and vitals within normal limits and patient deemed appropriate for discharge to home with outpatient follow up.

## 2024-10-11 NOTE — PROGRESS NOTE ADULT - ASSESSMENT
69M obese w/ T2DM, HTN, HLD, CABGx3, prostate removal, presented with a 2 day hx of lower abd pain with subjective fever, found on CT to have perforated appendicitis.    Consulted for diabetes management  Home regimen: mounjaro 12.5mg weekly, metformin 500mg 1 tab BID, novolin n insulin 75/40  Current a1c: 7.7%  Endocrinologist: Dr Wan    1. T2DM- mild hyperglycemia  - Increase admelog to 8 units TID  - Increase lantus to 22 units qhs  - Moderate correction scale  - POCT goal 120-180    2. Cecal mass, ruptured appendix  - S/p diagnostic laparoscopy, open right hemicolectomy  - Pain control  - Care per surgical team    3. HLD  - Continue statin

## 2024-10-11 NOTE — DISCHARGE NOTE NURSING/CASE MANAGEMENT/SOCIAL WORK - PATIENT PORTAL LINK FT
You can access the FollowMyHealth Patient Portal offered by [facility] by registering at the following website: [email or web address]. By joining [facility] [facility]’s FollowMyHealth portal, you will also be able to view your health information using other applications (apps) compatible with our system.

## 2024-10-11 NOTE — CDI QUERY NOTE - NSCDIOTHERTXTBX_GEN_ALL_CORE_HH
Admitted with abdominal pain, fever at home found to have cecal mass, perforated appendicitis and peritonitis  S/p open right hemicolectomy  Sepsis documented in ED    Can the status of sepsis be clarified in next progress note, after review?   - Sepsis on admission 2/2 appendicitis/peritonitis, resolving/resolved   - Sepsis ruled out   - Other    EVIDENCE/DOCUMENTATION  ED:  HPI: presenting with a two day history of lower abdominal pain. this morning he had an episode of vomiting and he developed chills a few hours ago. patient was seen at urgent care and told to come to the ED for imaging.    Principal Discharge DX:	Perforated appendicitis.  Medical Decision Making: septic workup sent given hx of fever. CT scan showing perforated appendicitis and cecal mass. surgery consulted. patient to be admitted for further evaluation and treatment by surgical team.    SEPSIS – was this patient treated for sepsis? Yes.   Presentation suggestive of: Bacterial Etiology Suspicion of sepsis at: 03-Oct-2024 22:00.     ED VITAL (from H&P)  Vital Signs Last 24 Hrs  T(C): 36.4 (04 Oct 2024 15:06), Max: 37.4 (04 Oct 2024 01:52)  T(F): 97.6 (04 Oct 2024 15:06), Max: 99.4 (04 Oct 2024 01:52)  HR: 70 (04 Oct 2024 15:06) (70 - 91)  BP: 145/62 (04 Oct 2024 15:06) (135/65 - 168/78)  BP(mean): --  RR: 18 (04 Oct 2024 15:06) (17 - 18)  SpO2: 96% (04 Oct 2024 15:06) (95% - 98%)    WBC Counts:  WBC Count: 11.51 K/uL *H* [3.80 - 10.50] (10-05-24)  WBC Count: 8.14 K/uL [3.80 - 10.50] (10-04-24)  WBC Count: 11.43 K/uL *H* [3.80 - 10.50] (10-03-24)    LACTATE:  Lactate, Blood: 2.3 mmol/L (10.03.24 @ 23:07)  Lactate, Blood: 1.3 mmol/L (10.04.24 @ 01:24)    Thank you

## 2024-10-11 NOTE — PROGRESS NOTE ADULT - SUBJECTIVE AND OBJECTIVE BOX
Subjective: Patient seen and examined at bedside this AM, no current complaints, denies SOB/CP/N/V. Patient tolerating diet, ambulating independently, voiding freely. Had small liquid BM. Overnight patient had some hypertension, he was given his home meds.       MEDICATIONS  (STANDING):  amLODIPine   Tablet 5 milliGRAM(s) Oral daily  aspirin  chewable 81 milliGRAM(s) Oral daily  atorvastatin 40 milliGRAM(s) Oral at bedtime  dextrose 5%. 1000 milliLiter(s) (50 mL/Hr) IV Continuous <Continuous>  dextrose 5%. 1000 milliLiter(s) (100 mL/Hr) IV Continuous <Continuous>  dextrose 50% Injectable 12.5 Gram(s) IV Push once  dextrose 50% Injectable 25 Gram(s) IV Push once  dextrose 50% Injectable 25 Gram(s) IV Push once  glucagon  Injectable 1 milliGRAM(s) IntraMuscular once  heparin   Injectable 5000 Unit(s) SubCutaneous every 8 hours  influenza  Vaccine (HIGH DOSE) 0.5 milliLiter(s) IntraMuscular once  insulin glargine Injectable (LANTUS) 18 Unit(s) SubCutaneous at bedtime  insulin lispro (ADMELOG) corrective regimen sliding scale   SubCutaneous three times a day before meals  insulin lispro Injectable (ADMELOG) 5 Unit(s) SubCutaneous three times a day before meals  lactated ringers. 1000 milliLiter(s) (100 mL/Hr) IV Continuous <Continuous>  lisinopril 40 milliGRAM(s) Oral daily  metoprolol tartrate 25 milliGRAM(s) Oral every 12 hours  pantoprazole  Injectable 40 milliGRAM(s) IV Push daily  piperacillin/tazobactam IVPB.. 3.375 Gram(s) IV Intermittent every 8 hours    MEDICATIONS  (PRN):  ondansetron Injectable 4 milliGRAM(s) IV Push every 6 hours PRN Nausea and/or Vomiting  oxyCODONE    IR 10 milliGRAM(s) Oral every 4 hours PRN Severe Pain (7 - 10)  oxyCODONE    IR 5 milliGRAM(s) Oral every 4 hours PRN Moderate Pain (4 - 6)      Vital Signs Last 24 Hrs  T(C): 37.1 (11 Oct 2024 05:14), Max: 37.1 (11 Oct 2024 05:14)  T(F): 98.7 (11 Oct 2024 05:14), Max: 98.7 (11 Oct 2024 05:14)  HR: 63 (11 Oct 2024 06:20) (61 - 76)  BP: 171/77 (11 Oct 2024 06:20) (122/63 - 200/82)  BP(mean): --  RR: 16 (11 Oct 2024 05:14) (16 - 18)  SpO2: 93% (11 Oct 2024 05:14) (93% - 94%)    Parameters below as of 11 Oct 2024 05:14  Patient On (Oxygen Delivery Method): room air        Physical Exam:    Constitutional: NAD  HEENT: PERRL, EOMI  Respiratory: Respirations non-labored, no accessory muscle use  Gastrointestinal: Soft, mild TTP in RLQ, mildly distended, incisions c/d/i   Neurological: A&O x 3      LABS:                        9.5    6.86  )-----------( 386      ( 11 Oct 2024 05:28 )             29.8     10-11    140  |  105  |  12.4  ----------------------------<  214[H]  4.1   |  23.0  |  0.99    Ca    8.5      11 Oct 2024 05:28  Phos  2.4     10-11  Mg     2.0     10-11        Urinalysis Basic - ( 11 Oct 2024 05:28 )    Color: x / Appearance: x / SG: x / pH: x  Gluc: 214 mg/dL / Ketone: x  / Bili: x / Urobili: x   Blood: x / Protein: x / Nitrite: x   Leuk Esterase: x / RBC: x / WBC x   Sq Epi: x / Non Sq Epi: x / Bacteria: x        A: Pt admitted for ruptured appendix with cecal mass had diagnostic laproscopy w/ open R. hemicolectomy on 10/7, now POD#4. Patient is progressing appropriately. Will advance diet today and encourage OOB.     Plan  -Continue zosyn  -Continue walking  -Diet: Regular  -maintain pain control  -monitor blood pressure (on home meds)  -DVT ppx: SQH, SCDs

## 2024-10-11 NOTE — DISCHARGE NOTE PROVIDER - CARE PROVIDER_API CALL
Josefina Martinez.  Colon/Rectal Surgery  [address] [place] [address]  Phone: [phone]  Fax: [phone]  Follow Up Time: 2 weeks

## 2024-10-11 NOTE — DIETITIAN INITIAL EVALUATION ADULT - PERSON TAUGHT/METHOD
verbal instruction/written material/teach back - (Patient repeats in own words)/patient instructed/spouse instructed/support person

## 2024-10-11 NOTE — PROGRESS NOTE ADULT - REASON FOR ADMISSION
cecal mass and perforated appendicitis

## 2024-10-13 LAB
CULTURE RESULTS: ABNORMAL
CULTURE RESULTS: SIGNIFICANT CHANGE UP
SPECIMEN SOURCE: SIGNIFICANT CHANGE UP
SPECIMEN SOURCE: SIGNIFICANT CHANGE UP

## 2024-10-16 ENCOUNTER — NON-APPOINTMENT (OUTPATIENT)
Age: 69
End: 2024-10-16

## 2024-10-17 ENCOUNTER — OUTPATIENT (OUTPATIENT)
Dept: OUTPATIENT SERVICES | Facility: HOSPITAL | Age: 69
LOS: 1 days | Discharge: HOME | End: 2024-10-17

## 2024-10-17 DIAGNOSIS — R79.9 ABNORMAL FINDING OF BLOOD CHEMISTRY, UNSPECIFIED: ICD-10-CM

## 2024-10-17 DIAGNOSIS — Z98.89 OTHER SPECIFIED POSTPROCEDURAL STATES: Chronic | ICD-10-CM

## 2024-10-22 ENCOUNTER — NON-APPOINTMENT (OUTPATIENT)
Age: 69
End: 2024-10-22

## 2024-10-22 PROBLEM — C18.9 COLON CANCER: Status: ACTIVE | Noted: 2024-10-22

## 2024-10-23 ENCOUNTER — RESULT REVIEW (OUTPATIENT)
Age: 69
End: 2024-10-23

## 2024-10-23 ENCOUNTER — APPOINTMENT (OUTPATIENT)
Dept: HEMATOLOGY ONCOLOGY | Facility: CLINIC | Age: 69
End: 2024-10-23
Payer: MEDICARE

## 2024-10-23 ENCOUNTER — NON-APPOINTMENT (OUTPATIENT)
Age: 69
End: 2024-10-23

## 2024-10-23 VITALS
WEIGHT: 239.2 LBS | OXYGEN SATURATION: 97 % | DIASTOLIC BLOOD PRESSURE: 67 MMHG | HEIGHT: 71 IN | TEMPERATURE: 97.2 F | SYSTOLIC BLOOD PRESSURE: 148 MMHG | BODY MASS INDEX: 33.49 KG/M2 | HEART RATE: 72 BPM

## 2024-10-23 LAB
BASOPHILS # BLD AUTO: 0.1 K/UL — SIGNIFICANT CHANGE UP (ref 0–0.2)
BASOPHILS NFR BLD AUTO: 1.2 % — SIGNIFICANT CHANGE UP (ref 0–2)
EOSINOPHIL # BLD AUTO: 0.2 K/UL — SIGNIFICANT CHANGE UP (ref 0–0.5)
EOSINOPHIL NFR BLD AUTO: 2.9 % — SIGNIFICANT CHANGE UP (ref 0–6)
HCT VFR BLD CALC: 29.8 % — LOW (ref 39–50)
HGB BLD-MCNC: 9.1 G/DL — LOW (ref 13–17)
LYMPHOCYTES # BLD AUTO: 1.6 K/UL — SIGNIFICANT CHANGE UP (ref 1–3.3)
LYMPHOCYTES # BLD AUTO: 29.3 % — SIGNIFICANT CHANGE UP (ref 13–44)
MCHC RBC-ENTMCNC: 25.6 PG — LOW (ref 27–34)
MCHC RBC-ENTMCNC: 30.5 G/DL — LOW (ref 32–36)
MCV RBC AUTO: 84 FL — SIGNIFICANT CHANGE UP (ref 80–100)
MONOCYTES # BLD AUTO: 0.4 K/UL — SIGNIFICANT CHANGE UP (ref 0–0.9)
MONOCYTES NFR BLD AUTO: 6.4 % — SIGNIFICANT CHANGE UP (ref 2–14)
NEUTROPHILS # BLD AUTO: 3.4 K/UL — SIGNIFICANT CHANGE UP (ref 1.8–7.4)
NEUTROPHILS NFR BLD AUTO: 60.1 % — SIGNIFICANT CHANGE UP (ref 43–77)
PLATELET # BLD AUTO: 333 K/UL — SIGNIFICANT CHANGE UP (ref 150–400)
RBC # BLD: 3.55 M/UL — LOW (ref 4.2–5.8)
RBC # FLD: 15.7 % — HIGH (ref 10.3–14.5)
WBC # BLD: 5.6 K/UL — SIGNIFICANT CHANGE UP (ref 3.8–10.5)
WBC # FLD AUTO: 5.6 K/UL — SIGNIFICANT CHANGE UP (ref 3.8–10.5)

## 2024-10-23 PROCEDURE — 99205 OFFICE O/P NEW HI 60 MIN: CPT

## 2024-10-23 RX ORDER — CAPECITABINE 500 MG/1
500 TABLET ORAL TWICE DAILY
Qty: 112 | Refills: 11 | Status: ACTIVE | COMMUNITY
Start: 2024-10-23 | End: 1900-01-01

## 2024-10-24 LAB
ALBUMIN SERPL ELPH-MCNC: 4 G/DL
ALP BLD-CCNC: 118 U/L
ALT SERPL-CCNC: 16 U/L
ANION GAP SERPL CALC-SCNC: 15 MMOL/L
APTT BLD: 29.4 SEC
AST SERPL-CCNC: 20 U/L
BILIRUB SERPL-MCNC: 0.4 MG/DL
BUN SERPL-MCNC: 29 MG/DL
CALCIUM SERPL-MCNC: 9.4 MG/DL
CEA SERPL-MCNC: 3.8 NG/ML
CHLORIDE SERPL-SCNC: 106 MMOL/L
CO2 SERPL-SCNC: 19 MMOL/L
CREAT SERPL-MCNC: 1.4 MG/DL
EGFR: 54 ML/MIN/1.73M2
GLUCOSE SERPL-MCNC: 284 MG/DL
HAV IGM SER QL: NONREACTIVE
HBV CORE IGG+IGM SER QL: NONREACTIVE
HBV CORE IGM SER QL: NONREACTIVE
HBV SURFACE AB SER QL: NONREACTIVE
HBV SURFACE AG SER QL: NONREACTIVE
HCV AB SER QL: NONREACTIVE
HCV S/CO RATIO: 0.1 S/CO
INR PPP: 0.86 RATIO
MAGNESIUM SERPL-MCNC: 1.8 MG/DL
POTASSIUM SERPL-SCNC: 5.1 MMOL/L
PROT SERPL-MCNC: 7.1 G/DL
PT BLD: 10.2 SEC
SODIUM SERPL-SCNC: 140 MMOL/L

## 2024-10-28 ENCOUNTER — APPOINTMENT (OUTPATIENT)
Dept: COLORECTAL SURGERY | Facility: CLINIC | Age: 69
End: 2024-10-28
Payer: MEDICARE

## 2024-10-28 VITALS
WEIGHT: 239 LBS | SYSTOLIC BLOOD PRESSURE: 132 MMHG | RESPIRATION RATE: 16 BRPM | DIASTOLIC BLOOD PRESSURE: 60 MMHG | OXYGEN SATURATION: 97 % | HEART RATE: 64 BPM | TEMPERATURE: 97 F | HEIGHT: 71 IN | BODY MASS INDEX: 33.46 KG/M2

## 2024-10-28 DIAGNOSIS — C18.9 MALIGNANT NEOPLASM OF COLON, UNSPECIFIED: ICD-10-CM

## 2024-10-28 PROCEDURE — 99024 POSTOP FOLLOW-UP VISIT: CPT

## 2024-11-13 ENCOUNTER — RESULT REVIEW (OUTPATIENT)
Age: 69
End: 2024-11-13

## 2024-11-13 ENCOUNTER — NON-APPOINTMENT (OUTPATIENT)
Age: 69
End: 2024-11-13

## 2024-11-13 ENCOUNTER — APPOINTMENT (OUTPATIENT)
Dept: HEMATOLOGY ONCOLOGY | Facility: CLINIC | Age: 69
End: 2024-11-13
Payer: MEDICARE

## 2024-11-13 VITALS
SYSTOLIC BLOOD PRESSURE: 170 MMHG | OXYGEN SATURATION: 97 % | HEIGHT: 71 IN | WEIGHT: 233 LBS | HEART RATE: 68 BPM | TEMPERATURE: 96.9 F | DIASTOLIC BLOOD PRESSURE: 62 MMHG | BODY MASS INDEX: 32.62 KG/M2

## 2024-11-13 LAB
BASOPHILS # BLD AUTO: 0.1 K/UL — SIGNIFICANT CHANGE UP (ref 0–0.2)
BASOPHILS NFR BLD AUTO: 2 % — SIGNIFICANT CHANGE UP (ref 0–2)
EOSINOPHIL # BLD AUTO: 0.2 K/UL — SIGNIFICANT CHANGE UP (ref 0–0.5)
EOSINOPHIL NFR BLD AUTO: 3 % — SIGNIFICANT CHANGE UP (ref 0–6)
HCT VFR BLD CALC: 32.2 % — LOW (ref 39–50)
HGB BLD-MCNC: 10.1 G/DL — LOW (ref 13–17)
LYMPHOCYTES # BLD AUTO: 1.3 K/UL — SIGNIFICANT CHANGE UP (ref 1–3.3)
LYMPHOCYTES # BLD AUTO: 24.2 % — SIGNIFICANT CHANGE UP (ref 13–44)
MCHC RBC-ENTMCNC: 26.9 PG — LOW (ref 27–34)
MCHC RBC-ENTMCNC: 31.3 G/DL — LOW (ref 32–36)
MCV RBC AUTO: 85.9 FL — SIGNIFICANT CHANGE UP (ref 80–100)
MONOCYTES # BLD AUTO: 0.4 K/UL — SIGNIFICANT CHANGE UP (ref 0–0.9)
MONOCYTES NFR BLD AUTO: 7.6 % — SIGNIFICANT CHANGE UP (ref 2–14)
NEUTROPHILS # BLD AUTO: 3.4 K/UL — SIGNIFICANT CHANGE UP (ref 1.8–7.4)
NEUTROPHILS NFR BLD AUTO: 63.2 % — SIGNIFICANT CHANGE UP (ref 43–77)
PLATELET # BLD AUTO: 250 K/UL — SIGNIFICANT CHANGE UP (ref 150–400)
RBC # BLD: 3.74 M/UL — LOW (ref 4.2–5.8)
RBC # FLD: 21.7 % — HIGH (ref 10.3–14.5)
WBC # BLD: 5.4 K/UL — SIGNIFICANT CHANGE UP (ref 3.8–10.5)
WBC # FLD AUTO: 5.4 K/UL — SIGNIFICANT CHANGE UP (ref 3.8–10.5)

## 2024-11-13 PROCEDURE — 99215 OFFICE O/P EST HI 40 MIN: CPT

## 2024-11-14 LAB
ALBUMIN SERPL ELPH-MCNC: 4.2 G/DL
ALP BLD-CCNC: 99 U/L
ALT SERPL-CCNC: 35 U/L
ANION GAP SERPL CALC-SCNC: 13 MMOL/L
AST SERPL-CCNC: 44 U/L
BILIRUB SERPL-MCNC: 0.8 MG/DL
BUN SERPL-MCNC: 24 MG/DL
CALCIUM SERPL-MCNC: 9.3 MG/DL
CHLORIDE SERPL-SCNC: 106 MMOL/L
CO2 SERPL-SCNC: 24 MMOL/L
CREAT SERPL-MCNC: 1.11 MG/DL
EGFR: 71 ML/MIN/1.73M2
GLUCOSE SERPL-MCNC: 223 MG/DL
MAGNESIUM SERPL-MCNC: 1.8 MG/DL
POTASSIUM SERPL-SCNC: 4.6 MMOL/L
PROT SERPL-MCNC: 7 G/DL
SODIUM SERPL-SCNC: 143 MMOL/L

## 2024-11-26 PROCEDURE — 96360 HYDRATION IV INFUSION INIT: CPT

## 2024-11-26 PROCEDURE — 86850 RBC ANTIBODY SCREEN: CPT

## 2024-11-26 PROCEDURE — 86341 ISLET CELL ANTIBODY: CPT

## 2024-11-26 PROCEDURE — 93005 ELECTROCARDIOGRAM TRACING: CPT

## 2024-11-26 PROCEDURE — 80048 BASIC METABOLIC PNL TOTAL CA: CPT

## 2024-11-26 PROCEDURE — 86900 BLOOD TYPING SEROLOGIC ABO: CPT

## 2024-11-26 PROCEDURE — 87040 BLOOD CULTURE FOR BACTERIA: CPT

## 2024-11-26 PROCEDURE — 81001 URINALYSIS AUTO W/SCOPE: CPT

## 2024-11-26 PROCEDURE — 99285 EMERGENCY DEPT VISIT HI MDM: CPT

## 2024-11-26 PROCEDURE — 71260 CT THORAX DX C+: CPT | Mod: MC

## 2024-11-26 PROCEDURE — 84100 ASSAY OF PHOSPHORUS: CPT

## 2024-11-26 PROCEDURE — 87077 CULTURE AEROBIC IDENTIFY: CPT

## 2024-11-26 PROCEDURE — 83735 ASSAY OF MAGNESIUM: CPT

## 2024-11-26 PROCEDURE — 88309 TISSUE EXAM BY PATHOLOGIST: CPT

## 2024-11-26 PROCEDURE — 87637 SARSCOV2&INF A&B&RSV AMP PRB: CPT

## 2024-11-26 PROCEDURE — 82378 CARCINOEMBRYONIC ANTIGEN: CPT

## 2024-11-26 PROCEDURE — 86923 COMPATIBILITY TEST ELECTRIC: CPT

## 2024-11-26 PROCEDURE — 82962 GLUCOSE BLOOD TEST: CPT

## 2024-11-26 PROCEDURE — C1889: CPT

## 2024-11-26 PROCEDURE — 85610 PROTHROMBIN TIME: CPT

## 2024-11-26 PROCEDURE — 85730 THROMBOPLASTIN TIME PARTIAL: CPT

## 2024-11-26 PROCEDURE — 82746 ASSAY OF FOLIC ACID SERUM: CPT

## 2024-11-26 PROCEDURE — 83690 ASSAY OF LIPASE: CPT

## 2024-11-26 PROCEDURE — C9399: CPT

## 2024-11-26 PROCEDURE — 80053 COMPREHEN METABOLIC PANEL: CPT

## 2024-11-26 PROCEDURE — 83550 IRON BINDING TEST: CPT

## 2024-11-26 PROCEDURE — 87070 CULTURE OTHR SPECIMN AEROBIC: CPT

## 2024-11-26 PROCEDURE — 84075 ASSAY ALKALINE PHOSPHATASE: CPT

## 2024-11-26 PROCEDURE — 82607 VITAMIN B-12: CPT

## 2024-11-26 PROCEDURE — 87086 URINE CULTURE/COLONY COUNT: CPT

## 2024-11-26 PROCEDURE — 84466 ASSAY OF TRANSFERRIN: CPT

## 2024-11-26 PROCEDURE — 96361 HYDRATE IV INFUSION ADD-ON: CPT

## 2024-11-26 PROCEDURE — 87075 CULTR BACTERIA EXCEPT BLOOD: CPT

## 2024-11-26 PROCEDURE — 83605 ASSAY OF LACTIC ACID: CPT

## 2024-11-26 PROCEDURE — 85025 COMPLETE CBC W/AUTO DIFF WBC: CPT

## 2024-11-26 PROCEDURE — 88342 IMHCHEM/IMCYTCHM 1ST ANTB: CPT

## 2024-11-26 PROCEDURE — 87205 SMEAR GRAM STAIN: CPT

## 2024-11-26 PROCEDURE — 74177 CT ABD & PELVIS W/CONTRAST: CPT | Mod: MC

## 2024-11-26 PROCEDURE — 86901 BLOOD TYPING SEROLOGIC RH(D): CPT

## 2024-11-26 PROCEDURE — 88307 TISSUE EXAM BY PATHOLOGIST: CPT

## 2024-11-26 PROCEDURE — C8929: CPT

## 2024-11-26 PROCEDURE — 85027 COMPLETE CBC AUTOMATED: CPT

## 2024-11-26 PROCEDURE — 36415 COLL VENOUS BLD VENIPUNCTURE: CPT

## 2024-11-26 PROCEDURE — 82728 ASSAY OF FERRITIN: CPT

## 2024-11-26 PROCEDURE — 83036 HEMOGLOBIN GLYCOSYLATED A1C: CPT

## 2024-11-26 PROCEDURE — 83540 ASSAY OF IRON: CPT

## 2024-11-26 PROCEDURE — 71045 X-RAY EXAM CHEST 1 VIEW: CPT

## 2024-11-26 PROCEDURE — 84681 ASSAY OF C-PEPTIDE: CPT

## 2024-12-03 ENCOUNTER — APPOINTMENT (OUTPATIENT)
Dept: DERMATOLOGY | Facility: CLINIC | Age: 69
End: 2024-12-03
Payer: MEDICARE

## 2024-12-03 PROCEDURE — 99213 OFFICE O/P EST LOW 20 MIN: CPT

## 2024-12-10 ENCOUNTER — APPOINTMENT (OUTPATIENT)
Dept: ORTHOPEDIC SURGERY | Facility: CLINIC | Age: 69
End: 2024-12-10
Payer: MEDICARE

## 2024-12-10 VITALS — HEIGHT: 71 IN | WEIGHT: 240 LBS | BODY MASS INDEX: 33.6 KG/M2

## 2024-12-10 DIAGNOSIS — R22.32 LOCALIZED SWELLING, MASS AND LUMP, LEFT UPPER LIMB: ICD-10-CM

## 2024-12-10 DIAGNOSIS — M79.645 PAIN IN LEFT FINGER(S): ICD-10-CM

## 2024-12-10 DIAGNOSIS — M79.642 PAIN IN LEFT HAND: ICD-10-CM

## 2024-12-10 PROCEDURE — 99204 OFFICE O/P NEW MOD 45 MIN: CPT

## 2024-12-10 PROCEDURE — 73130 X-RAY EXAM OF HAND: CPT | Mod: 50

## 2024-12-13 ENCOUNTER — RESULT REVIEW (OUTPATIENT)
Age: 69
End: 2024-12-13

## 2024-12-13 ENCOUNTER — APPOINTMENT (OUTPATIENT)
Dept: HEMATOLOGY ONCOLOGY | Facility: CLINIC | Age: 69
End: 2024-12-13
Payer: MEDICARE

## 2024-12-13 VITALS
BODY MASS INDEX: 33.6 KG/M2 | OXYGEN SATURATION: 96 % | SYSTOLIC BLOOD PRESSURE: 159 MMHG | WEIGHT: 240 LBS | HEIGHT: 71 IN | HEART RATE: 71 BPM | DIASTOLIC BLOOD PRESSURE: 79 MMHG

## 2024-12-13 DIAGNOSIS — C18.9 MALIGNANT NEOPLASM OF COLON, UNSPECIFIED: ICD-10-CM

## 2024-12-13 LAB
ALBUMIN SERPL ELPH-MCNC: 4.3 G/DL
ALP BLD-CCNC: 97 U/L
ALT SERPL-CCNC: 37 U/L
ANION GAP SERPL CALC-SCNC: 14 MMOL/L
AST SERPL-CCNC: 48 U/L
BASOPHILS # BLD AUTO: 0.1 K/UL — SIGNIFICANT CHANGE UP (ref 0–0.2)
BASOPHILS NFR BLD AUTO: 1.3 % — SIGNIFICANT CHANGE UP (ref 0–2)
BILIRUB SERPL-MCNC: 1.1 MG/DL
BUN SERPL-MCNC: 22 MG/DL
CALCIUM SERPL-MCNC: 9.5 MG/DL
CHLORIDE SERPL-SCNC: 104 MMOL/L
CO2 SERPL-SCNC: 22 MMOL/L
CREAT SERPL-MCNC: 1.08 MG/DL
EGFR: 74 ML/MIN/1.73M2
EOSINOPHIL # BLD AUTO: 0.1 K/UL — SIGNIFICANT CHANGE UP (ref 0–0.5)
EOSINOPHIL NFR BLD AUTO: 1.9 % — SIGNIFICANT CHANGE UP (ref 0–6)
GLUCOSE SERPL-MCNC: 259 MG/DL
HCT VFR BLD CALC: 34.8 % — LOW (ref 39–50)
HGB BLD-MCNC: 10.9 G/DL — LOW (ref 13–17)
LYMPHOCYTES # BLD AUTO: 1.5 K/UL — SIGNIFICANT CHANGE UP (ref 1–3.3)
LYMPHOCYTES # BLD AUTO: 30.2 % — SIGNIFICANT CHANGE UP (ref 13–44)
MAGNESIUM SERPL-MCNC: 1.9 MG/DL
MCHC RBC-ENTMCNC: 27.9 PG — SIGNIFICANT CHANGE UP (ref 27–34)
MCHC RBC-ENTMCNC: 31.4 G/DL — LOW (ref 32–36)
MCV RBC AUTO: 88.9 FL — SIGNIFICANT CHANGE UP (ref 80–100)
MONOCYTES # BLD AUTO: 0.4 K/UL — SIGNIFICANT CHANGE UP (ref 0–0.9)
MONOCYTES NFR BLD AUTO: 8.1 % — SIGNIFICANT CHANGE UP (ref 2–14)
NEUTROPHILS # BLD AUTO: 3 K/UL — SIGNIFICANT CHANGE UP (ref 1.8–7.4)
NEUTROPHILS NFR BLD AUTO: 58.6 % — SIGNIFICANT CHANGE UP (ref 43–77)
PLATELET # BLD AUTO: 225 K/UL — SIGNIFICANT CHANGE UP (ref 150–400)
POTASSIUM SERPL-SCNC: 4.9 MMOL/L
PROT SERPL-MCNC: 7.2 G/DL
RBC # BLD: 3.91 M/UL — LOW (ref 4.2–5.8)
RBC # FLD: 21.7 % — HIGH (ref 10.3–14.5)
SODIUM SERPL-SCNC: 139 MMOL/L
WBC # BLD: 5.1 K/UL — SIGNIFICANT CHANGE UP (ref 3.8–10.5)
WBC # FLD AUTO: 5.1 K/UL — SIGNIFICANT CHANGE UP (ref 3.8–10.5)

## 2024-12-13 PROCEDURE — 99214 OFFICE O/P EST MOD 30 MIN: CPT

## 2024-12-16 ENCOUNTER — RESULT REVIEW (OUTPATIENT)
Age: 69
End: 2024-12-16

## 2024-12-16 ENCOUNTER — APPOINTMENT (OUTPATIENT)
Dept: ORTHOPEDIC SURGERY | Facility: AMBULATORY SURGERY CENTER | Age: 69
End: 2024-12-16

## 2024-12-16 PROCEDURE — 26116 EXC HAND TUM DEEP < 1.5 CM: CPT | Mod: F2

## 2024-12-24 ENCOUNTER — APPOINTMENT (OUTPATIENT)
Dept: ORTHOPEDIC SURGERY | Facility: CLINIC | Age: 69
End: 2024-12-24
Payer: MEDICARE

## 2024-12-24 DIAGNOSIS — M79.642 PAIN IN LEFT HAND: ICD-10-CM

## 2024-12-24 DIAGNOSIS — R22.32 LOCALIZED SWELLING, MASS AND LUMP, LEFT UPPER LIMB: ICD-10-CM

## 2024-12-24 PROCEDURE — 99024 POSTOP FOLLOW-UP VISIT: CPT

## 2024-12-30 RX ORDER — DICLOFENAC SODIUM 10 MG/G
1 GEL TOPICAL
Qty: 1 | Refills: 0 | Status: ACTIVE | COMMUNITY
Start: 2024-12-30 | End: 1900-01-01

## 2024-12-31 ENCOUNTER — OUTPATIENT (OUTPATIENT)
Dept: OUTPATIENT SERVICES | Facility: HOSPITAL | Age: 69
LOS: 1 days | Discharge: HOME | End: 2024-12-31

## 2024-12-31 DIAGNOSIS — R79.9 ABNORMAL FINDING OF BLOOD CHEMISTRY, UNSPECIFIED: ICD-10-CM

## 2025-01-03 ENCOUNTER — APPOINTMENT (OUTPATIENT)
Dept: HEMATOLOGY ONCOLOGY | Facility: CLINIC | Age: 70
End: 2025-01-03
Payer: MEDICARE

## 2025-01-03 ENCOUNTER — RESULT REVIEW (OUTPATIENT)
Age: 70
End: 2025-01-03

## 2025-01-03 VITALS
BODY MASS INDEX: 35.32 KG/M2 | WEIGHT: 252.31 LBS | SYSTOLIC BLOOD PRESSURE: 138 MMHG | OXYGEN SATURATION: 96 % | DIASTOLIC BLOOD PRESSURE: 70 MMHG | HEART RATE: 85 BPM | TEMPERATURE: 97.3 F | HEIGHT: 71 IN

## 2025-01-03 LAB
BASOPHILS # BLD AUTO: 0.1 K/UL — SIGNIFICANT CHANGE UP (ref 0–0.2)
BASOPHILS NFR BLD AUTO: 1.4 % — SIGNIFICANT CHANGE UP (ref 0–2)
EOSINOPHIL # BLD AUTO: 0.2 K/UL — SIGNIFICANT CHANGE UP (ref 0–0.5)
EOSINOPHIL NFR BLD AUTO: 2.8 % — SIGNIFICANT CHANGE UP (ref 0–6)
HCT VFR BLD CALC: 33.8 % — LOW (ref 39–50)
HGB BLD-MCNC: 11 G/DL — LOW (ref 13–17)
LYMPHOCYTES # BLD AUTO: 1.7 K/UL — SIGNIFICANT CHANGE UP (ref 1–3.3)
LYMPHOCYTES # BLD AUTO: 30.5 % — SIGNIFICANT CHANGE UP (ref 13–44)
MCHC RBC-ENTMCNC: 29 PG — SIGNIFICANT CHANGE UP (ref 27–34)
MCHC RBC-ENTMCNC: 32.6 G/DL — SIGNIFICANT CHANGE UP (ref 32–36)
MCV RBC AUTO: 88.8 FL — SIGNIFICANT CHANGE UP (ref 80–100)
MONOCYTES # BLD AUTO: 0.5 K/UL — SIGNIFICANT CHANGE UP (ref 0–0.9)
MONOCYTES NFR BLD AUTO: 8.2 % — SIGNIFICANT CHANGE UP (ref 2–14)
NEUTROPHILS # BLD AUTO: 3.2 K/UL — SIGNIFICANT CHANGE UP (ref 1.8–7.4)
NEUTROPHILS NFR BLD AUTO: 57.1 % — SIGNIFICANT CHANGE UP (ref 43–77)
PLATELET # BLD AUTO: 227 K/UL — SIGNIFICANT CHANGE UP (ref 150–400)
RBC # BLD: 3.81 M/UL — LOW (ref 4.2–5.8)
RBC # FLD: 21.1 % — HIGH (ref 10.3–14.5)
WBC # BLD: 5.5 K/UL — SIGNIFICANT CHANGE UP (ref 3.8–10.5)
WBC # FLD AUTO: 5.5 K/UL — SIGNIFICANT CHANGE UP (ref 3.8–10.5)

## 2025-01-03 PROCEDURE — 99215 OFFICE O/P EST HI 40 MIN: CPT

## 2025-01-04 LAB
ALBUMIN SERPL ELPH-MCNC: 4.2 G/DL
ALP BLD-CCNC: 99 U/L
ALT SERPL-CCNC: 21 U/L
ANION GAP SERPL CALC-SCNC: 13 MMOL/L
AST SERPL-CCNC: 31 U/L
BILIRUB SERPL-MCNC: 1 MG/DL
BUN SERPL-MCNC: 24 MG/DL
CALCIUM SERPL-MCNC: 9.2 MG/DL
CEA SERPL-MCNC: 10 NG/ML
CHLORIDE SERPL-SCNC: 107 MMOL/L
CO2 SERPL-SCNC: 21 MMOL/L
CREAT SERPL-MCNC: 1.16 MG/DL
EGFR: 68 ML/MIN/1.73M2
GLUCOSE SERPL-MCNC: 255 MG/DL
MAGNESIUM SERPL-MCNC: 1.7 MG/DL
POTASSIUM SERPL-SCNC: 4.7 MMOL/L
PROT SERPL-MCNC: 7.1 G/DL
SODIUM SERPL-SCNC: 141 MMOL/L

## 2025-01-10 ENCOUNTER — TRANSCRIPTION ENCOUNTER (OUTPATIENT)
Age: 70
End: 2025-01-10

## 2025-01-21 ENCOUNTER — RESULT REVIEW (OUTPATIENT)
Age: 70
End: 2025-01-21

## 2025-01-21 ENCOUNTER — APPOINTMENT (OUTPATIENT)
Dept: HEMATOLOGY ONCOLOGY | Facility: CLINIC | Age: 70
End: 2025-01-21
Payer: MEDICARE

## 2025-01-21 VITALS
OXYGEN SATURATION: 97 % | SYSTOLIC BLOOD PRESSURE: 161 MMHG | BODY MASS INDEX: 35.63 KG/M2 | HEIGHT: 71 IN | TEMPERATURE: 97.2 F | WEIGHT: 254.5 LBS | HEART RATE: 74 BPM | DIASTOLIC BLOOD PRESSURE: 56 MMHG

## 2025-01-21 LAB
ALBUMIN SERPL ELPH-MCNC: 4.1 G/DL
ALP BLD-CCNC: 94 U/L
ALT SERPL-CCNC: 24 U/L
ANION GAP SERPL CALC-SCNC: 13 MMOL/L
AST SERPL-CCNC: 42 U/L
BASOPHILS # BLD AUTO: 0.1 K/UL — SIGNIFICANT CHANGE UP (ref 0–0.2)
BASOPHILS NFR BLD AUTO: 1.1 % — SIGNIFICANT CHANGE UP (ref 0–2)
BILIRUB SERPL-MCNC: 0.9 MG/DL
BUN SERPL-MCNC: 26 MG/DL
CALCIUM SERPL-MCNC: 9.4 MG/DL
CHLORIDE SERPL-SCNC: 107 MMOL/L
CO2 SERPL-SCNC: 23 MMOL/L
CREAT SERPL-MCNC: 1.2 MG/DL
EGFR: 65 ML/MIN/1.73M2
EOSINOPHIL # BLD AUTO: 0.3 K/UL — SIGNIFICANT CHANGE UP (ref 0–0.5)
EOSINOPHIL NFR BLD AUTO: 4.3 % — SIGNIFICANT CHANGE UP (ref 0–6)
GLUCOSE SERPL-MCNC: 223 MG/DL
HCT VFR BLD CALC: 33 % — LOW (ref 39–50)
HGB BLD-MCNC: 11 G/DL — LOW (ref 13–17)
LYMPHOCYTES # BLD AUTO: 1.5 K/UL — SIGNIFICANT CHANGE UP (ref 1–3.3)
LYMPHOCYTES # BLD AUTO: 22.9 % — SIGNIFICANT CHANGE UP (ref 13–44)
MAGNESIUM SERPL-MCNC: 1.9 MG/DL
MCHC RBC-ENTMCNC: 30 PG — SIGNIFICANT CHANGE UP (ref 27–34)
MCHC RBC-ENTMCNC: 33.1 G/DL — SIGNIFICANT CHANGE UP (ref 32–36)
MCV RBC AUTO: 90.4 FL — SIGNIFICANT CHANGE UP (ref 80–100)
MONOCYTES # BLD AUTO: 0.4 K/UL — SIGNIFICANT CHANGE UP (ref 0–0.9)
MONOCYTES NFR BLD AUTO: 6.5 % — SIGNIFICANT CHANGE UP (ref 2–14)
NEUTROPHILS # BLD AUTO: 4.3 K/UL — SIGNIFICANT CHANGE UP (ref 1.8–7.4)
NEUTROPHILS NFR BLD AUTO: 65.1 % — SIGNIFICANT CHANGE UP (ref 43–77)
PLATELET # BLD AUTO: 234 K/UL — SIGNIFICANT CHANGE UP (ref 150–400)
POTASSIUM SERPL-SCNC: 4.6 MMOL/L
PROT SERPL-MCNC: 6.9 G/DL
RBC # BLD: 3.65 M/UL — LOW (ref 4.2–5.8)
RBC # FLD: 21.7 % — HIGH (ref 10.3–14.5)
SODIUM SERPL-SCNC: 142 MMOL/L
WBC # BLD: 6.7 K/UL — SIGNIFICANT CHANGE UP (ref 3.8–10.5)
WBC # FLD AUTO: 6.7 K/UL — SIGNIFICANT CHANGE UP (ref 3.8–10.5)

## 2025-01-21 PROCEDURE — 99214 OFFICE O/P EST MOD 30 MIN: CPT

## 2025-01-27 ENCOUNTER — APPOINTMENT (OUTPATIENT)
Dept: COLORECTAL SURGERY | Facility: CLINIC | Age: 70
End: 2025-01-27
Payer: MEDICARE

## 2025-01-27 VITALS
BODY MASS INDEX: 34.3 KG/M2 | RESPIRATION RATE: 15 BRPM | HEART RATE: 73 BPM | WEIGHT: 245 LBS | HEIGHT: 71 IN | DIASTOLIC BLOOD PRESSURE: 69 MMHG | SYSTOLIC BLOOD PRESSURE: 112 MMHG | TEMPERATURE: 97.3 F

## 2025-01-27 DIAGNOSIS — C18.9 MALIGNANT NEOPLASM OF COLON, UNSPECIFIED: ICD-10-CM

## 2025-01-27 PROCEDURE — 99214 OFFICE O/P EST MOD 30 MIN: CPT

## 2025-02-04 ENCOUNTER — APPOINTMENT (OUTPATIENT)
Dept: ORTHOPEDIC SURGERY | Facility: CLINIC | Age: 70
End: 2025-02-04
Payer: MEDICARE

## 2025-02-04 DIAGNOSIS — M79.642 PAIN IN LEFT HAND: ICD-10-CM

## 2025-02-04 DIAGNOSIS — R22.32 LOCALIZED SWELLING, MASS AND LUMP, LEFT UPPER LIMB: ICD-10-CM

## 2025-02-04 PROCEDURE — 99024 POSTOP FOLLOW-UP VISIT: CPT

## 2025-02-26 ENCOUNTER — APPOINTMENT (OUTPATIENT)
Dept: HEMATOLOGY ONCOLOGY | Facility: CLINIC | Age: 70
End: 2025-02-26
Payer: MEDICARE

## 2025-02-26 ENCOUNTER — RESULT REVIEW (OUTPATIENT)
Age: 70
End: 2025-02-26

## 2025-02-26 ENCOUNTER — NON-APPOINTMENT (OUTPATIENT)
Age: 70
End: 2025-02-26

## 2025-02-26 VITALS
BODY MASS INDEX: 35.35 KG/M2 | HEIGHT: 71 IN | WEIGHT: 252.52 LBS | TEMPERATURE: 97.1 F | OXYGEN SATURATION: 97 % | DIASTOLIC BLOOD PRESSURE: 60 MMHG | HEART RATE: 67 BPM | SYSTOLIC BLOOD PRESSURE: 137 MMHG

## 2025-02-26 LAB
ALBUMIN SERPL ELPH-MCNC: 4.4 G/DL
ALP BLD-CCNC: 83 U/L
ALT SERPL-CCNC: 21 U/L
ANION GAP SERPL CALC-SCNC: 14 MMOL/L
ANISOCYTOSIS BLD QL: SLIGHT — SIGNIFICANT CHANGE UP
AST SERPL-CCNC: 37 U/L
BASOPHILS # BLD AUTO: 0.06 K/UL — SIGNIFICANT CHANGE UP (ref 0–0.2)
BASOPHILS NFR BLD AUTO: 1.4 % — SIGNIFICANT CHANGE UP (ref 0–2)
BILIRUB SERPL-MCNC: 1.2 MG/DL
BUN SERPL-MCNC: 23 MG/DL
BURR CELLS BLD QL SMEAR: SLIGHT — SIGNIFICANT CHANGE UP
CALCIUM SERPL-MCNC: 9.3 MG/DL
CEA SERPL-MCNC: 11.2 NG/ML
CHLORIDE SERPL-SCNC: 106 MMOL/L
CO2 SERPL-SCNC: 20 MMOL/L
CREAT SERPL-MCNC: 1.26 MG/DL
DACRYOCYTES BLD QL SMEAR: SLIGHT — SIGNIFICANT CHANGE UP
EGFR: 61 ML/MIN/1.73M2
ELLIPTOCYTES BLD QL SMEAR: SLIGHT — SIGNIFICANT CHANGE UP
EOSINOPHIL # BLD AUTO: 0.14 K/UL — SIGNIFICANT CHANGE UP (ref 0–0.5)
EOSINOPHIL NFR BLD AUTO: 3.2 % — SIGNIFICANT CHANGE UP (ref 0–6)
GLUCOSE SERPL-MCNC: 236 MG/DL
HCT VFR BLD CALC: 34.1 % — LOW (ref 39–50)
HGB BLD-MCNC: 11 G/DL — LOW (ref 13–17)
HYPOCHROMIA BLD QL: SLIGHT — SIGNIFICANT CHANGE UP
IMM GRANULOCYTES # BLD AUTO: 0.01 K/UL — SIGNIFICANT CHANGE UP (ref 0–0.07)
IMM GRANULOCYTES NFR BLD AUTO: 0.2 % — SIGNIFICANT CHANGE UP (ref 0–0.9)
LG PLATELETS BLD QL AUTO: SLIGHT — SIGNIFICANT CHANGE UP
LYMPHOCYTES # BLD AUTO: 1.22 K/UL — SIGNIFICANT CHANGE UP (ref 1–3.3)
LYMPHOCYTES NFR BLD AUTO: 28.1 % — SIGNIFICANT CHANGE UP (ref 13–44)
MACROCYTES BLD QL: SLIGHT — SIGNIFICANT CHANGE UP
MAGNESIUM SERPL-MCNC: 1.7 MG/DL
MANUAL SMEAR VERIFICATION: SIGNIFICANT CHANGE UP
MCHC RBC-ENTMCNC: 30.6 PG — SIGNIFICANT CHANGE UP (ref 27–34)
MCHC RBC-ENTMCNC: 32.3 G/DL — SIGNIFICANT CHANGE UP (ref 32–36)
MCV RBC AUTO: 94.7 FL — SIGNIFICANT CHANGE UP (ref 80–100)
MICROCYTES BLD QL: SLIGHT — SIGNIFICANT CHANGE UP
MONOCYTES # BLD AUTO: 0.45 K/UL — SIGNIFICANT CHANGE UP (ref 0–0.9)
MONOCYTES NFR BLD AUTO: 10.4 % — SIGNIFICANT CHANGE UP (ref 2–14)
NEUTROPHILS # BLD AUTO: 2.46 K/UL — SIGNIFICANT CHANGE UP (ref 1.8–7.4)
NEUTROPHILS NFR BLD AUTO: 56.7 % — SIGNIFICANT CHANGE UP (ref 43–77)
NRBC # BLD AUTO: 0 K/UL — SIGNIFICANT CHANGE UP (ref 0–0)
NRBC # FLD: 0 K/UL — SIGNIFICANT CHANGE UP (ref 0–0)
NRBC BLD AUTO-RTO: 0 /100 WBCS — SIGNIFICANT CHANGE UP (ref 0–0)
OVALOCYTES BLD QL SMEAR: SLIGHT — SIGNIFICANT CHANGE UP
PLAT MORPH BLD: NORMAL — SIGNIFICANT CHANGE UP
PLATELET # BLD AUTO: 222 K/UL — SIGNIFICANT CHANGE UP (ref 150–400)
PMV BLD: 10.9 FL — SIGNIFICANT CHANGE UP (ref 7–13)
POIKILOCYTOSIS BLD QL AUTO: ABNORMAL
POTASSIUM SERPL-SCNC: 4.6 MMOL/L
PROT SERPL-MCNC: 7.1 G/DL
RBC # BLD: 3.6 M/UL — LOW (ref 4.2–5.8)
RBC # FLD: 21.6 % — HIGH (ref 10.3–14.5)
RBC BLD AUTO: ABNORMAL
SCHISTOCYTES BLD QL AUTO: SLIGHT — SIGNIFICANT CHANGE UP
SODIUM SERPL-SCNC: 140 MMOL/L
WBC # BLD: 4.34 K/UL — SIGNIFICANT CHANGE UP (ref 3.8–10.5)
WBC # FLD AUTO: 4.34 K/UL — SIGNIFICANT CHANGE UP (ref 3.8–10.5)
WBC MORPHOLOGY: NORMAL — SIGNIFICANT CHANGE UP

## 2025-02-26 PROCEDURE — 99214 OFFICE O/P EST MOD 30 MIN: CPT

## 2025-03-11 ENCOUNTER — OUTPATIENT (OUTPATIENT)
Dept: OUTPATIENT SERVICES | Facility: HOSPITAL | Age: 70
LOS: 1 days | Discharge: HOME | End: 2025-03-11

## 2025-03-11 DIAGNOSIS — R79.9 ABNORMAL FINDING OF BLOOD CHEMISTRY, UNSPECIFIED: ICD-10-CM

## 2025-03-11 DIAGNOSIS — Z98.89 OTHER SPECIFIED POSTPROCEDURAL STATES: Chronic | ICD-10-CM

## 2025-03-25 ENCOUNTER — RESULT REVIEW (OUTPATIENT)
Age: 70
End: 2025-03-25

## 2025-03-25 ENCOUNTER — APPOINTMENT (OUTPATIENT)
Dept: HEMATOLOGY ONCOLOGY | Facility: CLINIC | Age: 70
End: 2025-03-25
Payer: MEDICARE

## 2025-03-25 VITALS
HEIGHT: 71 IN | TEMPERATURE: 97.1 F | BODY MASS INDEX: 36.02 KG/M2 | DIASTOLIC BLOOD PRESSURE: 63 MMHG | SYSTOLIC BLOOD PRESSURE: 145 MMHG | OXYGEN SATURATION: 95 % | WEIGHT: 257.28 LBS | HEART RATE: 80 BPM

## 2025-03-25 LAB
ALBUMIN SERPL ELPH-MCNC: 4.3 G/DL
ALP BLD-CCNC: 93 U/L
ALT SERPL-CCNC: 26 U/L
ANION GAP SERPL CALC-SCNC: 11 MMOL/L
ANISOCYTOSIS BLD QL: SLIGHT — SIGNIFICANT CHANGE UP
AST SERPL-CCNC: 41 U/L
BASOPHILS # BLD AUTO: 0.07 K/UL — SIGNIFICANT CHANGE UP (ref 0–0.2)
BASOPHILS NFR BLD AUTO: 1.2 % — SIGNIFICANT CHANGE UP (ref 0–2)
BILIRUB SERPL-MCNC: 1.3 MG/DL
BUN SERPL-MCNC: 22 MG/DL
CALCIUM SERPL-MCNC: 9.1 MG/DL
CEA SERPL-MCNC: 16 NG/ML
CHLORIDE SERPL-SCNC: 109 MMOL/L
CO2 SERPL-SCNC: 20 MMOL/L
CREAT SERPL-MCNC: 1.12 MG/DL
EGFRCR SERPLBLD CKD-EPI 2021: 70 ML/MIN/1.73M2
EOSINOPHIL # BLD AUTO: 0.15 K/UL — SIGNIFICANT CHANGE UP (ref 0–0.5)
EOSINOPHIL NFR BLD AUTO: 2.5 % — SIGNIFICANT CHANGE UP (ref 0–6)
GLUCOSE SERPL-MCNC: 189 MG/DL
HCT VFR BLD CALC: 33.2 % — LOW (ref 39–50)
HGB BLD-MCNC: 10.9 G/DL — LOW (ref 13–17)
IMM GRANULOCYTES # BLD AUTO: 0.06 K/UL — SIGNIFICANT CHANGE UP (ref 0–0.07)
IMM GRANULOCYTES NFR BLD AUTO: 1 % — HIGH (ref 0–0.9)
LYMPHOCYTES # BLD AUTO: 1.4 K/UL — SIGNIFICANT CHANGE UP (ref 1–3.3)
LYMPHOCYTES NFR BLD AUTO: 23.8 % — SIGNIFICANT CHANGE UP (ref 13–44)
MACROCYTES BLD QL: SLIGHT — SIGNIFICANT CHANGE UP
MAGNESIUM SERPL-MCNC: 1.8 MG/DL
MANUAL SMEAR VERIFICATION: SIGNIFICANT CHANGE UP
MCHC RBC-ENTMCNC: 31.4 PG — SIGNIFICANT CHANGE UP (ref 27–34)
MCHC RBC-ENTMCNC: 32.8 G/DL — SIGNIFICANT CHANGE UP (ref 32–36)
MCV RBC AUTO: 95.7 FL — SIGNIFICANT CHANGE UP (ref 80–100)
MICROCYTES BLD QL: SLIGHT — SIGNIFICANT CHANGE UP
MONOCYTES # BLD AUTO: 0.6 K/UL — SIGNIFICANT CHANGE UP (ref 0–0.9)
MONOCYTES NFR BLD AUTO: 10.2 % — SIGNIFICANT CHANGE UP (ref 2–14)
NEUTROPHILS # BLD AUTO: 3.61 K/UL — SIGNIFICANT CHANGE UP (ref 1.8–7.4)
NEUTROPHILS NFR BLD AUTO: 61.3 % — SIGNIFICANT CHANGE UP (ref 43–77)
NRBC # BLD AUTO: 0 K/UL — SIGNIFICANT CHANGE UP (ref 0–0)
NRBC # FLD: 0 K/UL — SIGNIFICANT CHANGE UP (ref 0–0)
NRBC BLD AUTO-RTO: 0 /100 WBCS — SIGNIFICANT CHANGE UP (ref 0–0)
PLAT MORPH BLD: NORMAL — SIGNIFICANT CHANGE UP
PLATELET # BLD AUTO: 212 K/UL — SIGNIFICANT CHANGE UP (ref 150–400)
PMV BLD: 10.8 FL — SIGNIFICANT CHANGE UP (ref 7–13)
POIKILOCYTOSIS BLD QL AUTO: SLIGHT — SIGNIFICANT CHANGE UP
POTASSIUM SERPL-SCNC: 4.2 MMOL/L
PROT SERPL-MCNC: 7 G/DL
RBC # BLD: 3.47 M/UL — LOW (ref 4.2–5.8)
RBC # FLD: 21.3 % — HIGH (ref 10.3–14.5)
RBC BLD AUTO: SIGNIFICANT CHANGE UP
SODIUM SERPL-SCNC: 140 MMOL/L
WBC # BLD: 5.89 K/UL — SIGNIFICANT CHANGE UP (ref 3.8–10.5)
WBC # FLD AUTO: 5.89 K/UL — SIGNIFICANT CHANGE UP (ref 3.8–10.5)

## 2025-03-25 PROCEDURE — 99215 OFFICE O/P EST HI 40 MIN: CPT

## 2025-03-25 RX ORDER — PREDNISONE 50 MG/1
50 TABLET ORAL
Qty: 3 | Refills: 1 | Status: ACTIVE | COMMUNITY
Start: 2025-03-25 | End: 1900-01-01

## 2025-03-25 RX ORDER — DIPHENHYDRAMINE HCL 50 MG/1
50 CAPSULE ORAL
Qty: 1 | Refills: 1 | Status: ACTIVE | COMMUNITY
Start: 2025-03-25 | End: 1900-01-01

## 2025-04-18 ENCOUNTER — OUTPATIENT (OUTPATIENT)
Dept: OUTPATIENT SERVICES | Facility: HOSPITAL | Age: 70
LOS: 1 days | End: 2025-04-18

## 2025-04-18 ENCOUNTER — APPOINTMENT (OUTPATIENT)
Dept: CT IMAGING | Facility: CLINIC | Age: 70
End: 2025-04-18
Payer: MEDICARE

## 2025-04-18 DIAGNOSIS — Z98.89 OTHER SPECIFIED POSTPROCEDURAL STATES: Chronic | ICD-10-CM

## 2025-04-18 DIAGNOSIS — C18.9 MALIGNANT NEOPLASM OF COLON, UNSPECIFIED: ICD-10-CM

## 2025-04-18 PROCEDURE — 71260 CT THORAX DX C+: CPT | Mod: 26

## 2025-04-18 PROCEDURE — 74177 CT ABD & PELVIS W/CONTRAST: CPT | Mod: 26

## 2025-04-29 ENCOUNTER — NON-APPOINTMENT (OUTPATIENT)
Age: 70
End: 2025-04-29

## 2025-04-29 ENCOUNTER — APPOINTMENT (OUTPATIENT)
Dept: HEMATOLOGY ONCOLOGY | Facility: CLINIC | Age: 70
End: 2025-04-29
Payer: MEDICARE

## 2025-04-29 VITALS
OXYGEN SATURATION: 96 % | HEIGHT: 71 IN | DIASTOLIC BLOOD PRESSURE: 69 MMHG | HEART RATE: 80 BPM | TEMPERATURE: 97.6 F | SYSTOLIC BLOOD PRESSURE: 176 MMHG | WEIGHT: 252 LBS | BODY MASS INDEX: 35.28 KG/M2

## 2025-04-29 DIAGNOSIS — Z86.39 PERSONAL HISTORY OF OTHER ENDOCRINE, NUTRITIONAL AND METABOLIC DISEASE: ICD-10-CM

## 2025-04-29 DIAGNOSIS — I25.10 ATHEROSCLEROTIC HEART DISEASE OF NATIVE CORONARY ARTERY W/OUT ANGINA PECTORIS: ICD-10-CM

## 2025-04-29 DIAGNOSIS — C18.9 MALIGNANT NEOPLASM OF COLON, UNSPECIFIED: ICD-10-CM

## 2025-04-29 PROCEDURE — 99215 OFFICE O/P EST HI 40 MIN: CPT

## 2025-04-29 PROCEDURE — 93010 ELECTROCARDIOGRAM REPORT: CPT

## 2025-04-29 RX ORDER — ONDANSETRON 8 MG/1
8 TABLET, ORALLY DISINTEGRATING ORAL EVERY 8 HOURS
Qty: 90 | Refills: 1 | Status: ACTIVE | COMMUNITY
Start: 2025-04-29 | End: 1900-01-01

## 2025-04-29 RX ORDER — OLANZAPINE 5 MG/1
5 TABLET, FILM COATED ORAL
Qty: 30 | Refills: 0 | Status: ACTIVE | COMMUNITY
Start: 2025-04-29 | End: 1900-01-01

## 2025-05-05 ENCOUNTER — APPOINTMENT (OUTPATIENT)
Dept: DERMATOLOGY | Facility: CLINIC | Age: 70
End: 2025-05-05
Payer: MEDICARE

## 2025-05-05 PROCEDURE — 99213 OFFICE O/P EST LOW 20 MIN: CPT

## 2025-05-06 ENCOUNTER — RESULT REVIEW (OUTPATIENT)
Age: 70
End: 2025-05-06

## 2025-05-06 ENCOUNTER — APPOINTMENT (OUTPATIENT)
Dept: HEMATOLOGY ONCOLOGY | Facility: CLINIC | Age: 70
End: 2025-05-06

## 2025-05-06 LAB
BASOPHILS # BLD AUTO: 0.08 K/UL — SIGNIFICANT CHANGE UP (ref 0–0.2)
BASOPHILS NFR BLD AUTO: 1.2 % — SIGNIFICANT CHANGE UP (ref 0–2)
EOSINOPHIL # BLD AUTO: 0.26 K/UL — SIGNIFICANT CHANGE UP (ref 0–0.5)
EOSINOPHIL NFR BLD AUTO: 4 % — SIGNIFICANT CHANGE UP (ref 0–6)
HCT VFR BLD CALC: 36.8 % — LOW (ref 39–50)
HGB BLD-MCNC: 12 G/DL — LOW (ref 13–17)
IMM GRANULOCYTES # BLD AUTO: 0.03 K/UL — SIGNIFICANT CHANGE UP (ref 0–0.07)
IMM GRANULOCYTES NFR BLD AUTO: 0.5 % — SIGNIFICANT CHANGE UP (ref 0–0.9)
LYMPHOCYTES # BLD AUTO: 1.64 K/UL — SIGNIFICANT CHANGE UP (ref 1–3.3)
LYMPHOCYTES NFR BLD AUTO: 25.3 % — SIGNIFICANT CHANGE UP (ref 13–44)
MCHC RBC-ENTMCNC: 31.4 PG — SIGNIFICANT CHANGE UP (ref 27–34)
MCHC RBC-ENTMCNC: 32.6 G/DL — SIGNIFICANT CHANGE UP (ref 32–36)
MCV RBC AUTO: 96.3 FL — SIGNIFICANT CHANGE UP (ref 80–100)
MONOCYTES # BLD AUTO: 0.56 K/UL — SIGNIFICANT CHANGE UP (ref 0–0.9)
MONOCYTES NFR BLD AUTO: 8.6 % — SIGNIFICANT CHANGE UP (ref 2–14)
NEUTROPHILS # BLD AUTO: 3.91 K/UL — SIGNIFICANT CHANGE UP (ref 1.8–7.4)
NEUTROPHILS NFR BLD AUTO: 60.4 % — SIGNIFICANT CHANGE UP (ref 43–77)
NRBC # BLD AUTO: 0 K/UL — SIGNIFICANT CHANGE UP (ref 0–0)
NRBC # FLD: 0 K/UL — SIGNIFICANT CHANGE UP (ref 0–0)
NRBC BLD AUTO-RTO: 0 /100 WBCS — SIGNIFICANT CHANGE UP (ref 0–0)
PLATELET # BLD AUTO: 224 K/UL — SIGNIFICANT CHANGE UP (ref 150–400)
PMV BLD: 11.8 FL — SIGNIFICANT CHANGE UP (ref 7–13)
RBC # BLD: 3.82 M/UL — LOW (ref 4.2–5.8)
RBC # FLD: 18 % — HIGH (ref 10.3–14.5)
WBC # BLD: 6.48 K/UL — SIGNIFICANT CHANGE UP (ref 3.8–10.5)
WBC # FLD AUTO: 6.48 K/UL — SIGNIFICANT CHANGE UP (ref 3.8–10.5)

## 2025-05-07 LAB
ALBUMIN SERPL ELPH-MCNC: 4.4 G/DL
ALP BLD-CCNC: 98 U/L
ALT SERPL-CCNC: 26 U/L
ANION GAP SERPL CALC-SCNC: 16 MMOL/L
AST SERPL-CCNC: 40 U/L
BILIRUB SERPL-MCNC: 0.8 MG/DL
BUN SERPL-MCNC: 25 MG/DL
CALCIUM SERPL-MCNC: 9.9 MG/DL
CEA SERPL-MCNC: 23.2 NG/ML
CHLORIDE SERPL-SCNC: 106 MMOL/L
CO2 SERPL-SCNC: 22 MMOL/L
CREAT SERPL-MCNC: 1.39 MG/DL
EGFRCR SERPLBLD CKD-EPI 2021: 54 ML/MIN/1.73M2
GLUCOSE SERPL-MCNC: 193 MG/DL
INR PPP: 0.94 RATIO
MAGNESIUM SERPL-MCNC: 1.8 MG/DL
POTASSIUM SERPL-SCNC: 5.1 MMOL/L
PROT SERPL-MCNC: 7.3 G/DL
PT BLD: 11.1 SEC
SODIUM SERPL-SCNC: 144 MMOL/L

## 2025-05-27 ENCOUNTER — OUTPATIENT (OUTPATIENT)
Dept: OUTPATIENT SERVICES | Facility: HOSPITAL | Age: 70
LOS: 1 days | Discharge: HOME | End: 2025-05-27
Payer: MEDICARE

## 2025-05-27 DIAGNOSIS — R79.9 ABNORMAL FINDING OF BLOOD CHEMISTRY, UNSPECIFIED: ICD-10-CM

## 2025-05-27 DIAGNOSIS — Z98.89 OTHER SPECIFIED POSTPROCEDURAL STATES: Chronic | ICD-10-CM

## 2025-06-03 ENCOUNTER — APPOINTMENT (OUTPATIENT)
Dept: HEMATOLOGY ONCOLOGY | Facility: CLINIC | Age: 70
End: 2025-06-03

## 2025-06-04 ENCOUNTER — OUTPATIENT (OUTPATIENT)
Dept: OUTPATIENT SERVICES | Facility: HOSPITAL | Age: 70
LOS: 1 days | End: 2025-06-04

## 2025-06-04 ENCOUNTER — APPOINTMENT (OUTPATIENT)
Dept: INTERVENTIONAL RADIOLOGY/VASCULAR | Facility: CLINIC | Age: 70
End: 2025-06-04

## 2025-06-04 VITALS
TEMPERATURE: 97.2 F | RESPIRATION RATE: 16 BRPM | SYSTOLIC BLOOD PRESSURE: 172 MMHG | OXYGEN SATURATION: 96 % | HEART RATE: 62 BPM | DIASTOLIC BLOOD PRESSURE: 76 MMHG

## 2025-06-04 DIAGNOSIS — Z98.89 OTHER SPECIFIED POSTPROCEDURAL STATES: Chronic | ICD-10-CM

## 2025-06-04 DIAGNOSIS — C18.9 MALIGNANT NEOPLASM OF COLON, UNSPECIFIED: ICD-10-CM

## 2025-06-04 PROCEDURE — 76937 US GUIDE VASCULAR ACCESS: CPT | Mod: 26

## 2025-06-04 PROCEDURE — 36561 INSERT TUNNELED CV CATH: CPT | Mod: RT

## 2025-06-04 PROCEDURE — 77001 FLUOROGUIDE FOR VEIN DEVICE: CPT | Mod: 26

## 2025-06-09 ENCOUNTER — RESULT REVIEW (OUTPATIENT)
Age: 70
End: 2025-06-09

## 2025-06-09 ENCOUNTER — APPOINTMENT (OUTPATIENT)
Age: 70
End: 2025-06-09

## 2025-06-09 LAB
BASOPHILS # BLD AUTO: 0.05 K/UL — SIGNIFICANT CHANGE UP (ref 0–0.2)
BASOPHILS NFR BLD AUTO: 0.9 % — SIGNIFICANT CHANGE UP (ref 0–2)
EOSINOPHIL # BLD AUTO: 0.12 K/UL — SIGNIFICANT CHANGE UP (ref 0–0.5)
EOSINOPHIL NFR BLD AUTO: 2.3 % — SIGNIFICANT CHANGE UP (ref 0–6)
HCT VFR BLD CALC: 35.5 % — LOW (ref 39–50)
HGB BLD-MCNC: 11.4 G/DL — LOW (ref 13–17)
IMM GRANULOCYTES # BLD AUTO: 0.02 K/UL — SIGNIFICANT CHANGE UP (ref 0–0.07)
IMM GRANULOCYTES NFR BLD AUTO: 0.4 % — SIGNIFICANT CHANGE UP (ref 0–0.9)
LYMPHOCYTES # BLD AUTO: 1.11 K/UL — SIGNIFICANT CHANGE UP (ref 1–3.3)
LYMPHOCYTES NFR BLD AUTO: 21 % — SIGNIFICANT CHANGE UP (ref 13–44)
MCHC RBC-ENTMCNC: 29.8 PG — SIGNIFICANT CHANGE UP (ref 27–34)
MCHC RBC-ENTMCNC: 32.1 G/DL — SIGNIFICANT CHANGE UP (ref 32–36)
MCV RBC AUTO: 92.7 FL — SIGNIFICANT CHANGE UP (ref 80–100)
MONOCYTES # BLD AUTO: 0.47 K/UL — SIGNIFICANT CHANGE UP (ref 0–0.9)
MONOCYTES NFR BLD AUTO: 8.9 % — SIGNIFICANT CHANGE UP (ref 2–14)
NEUTROPHILS # BLD AUTO: 3.52 K/UL — SIGNIFICANT CHANGE UP (ref 1.8–7.4)
NEUTROPHILS NFR BLD AUTO: 66.5 % — SIGNIFICANT CHANGE UP (ref 43–77)
NRBC # BLD AUTO: 0 K/UL — SIGNIFICANT CHANGE UP (ref 0–0)
NRBC # FLD: 0 K/UL — SIGNIFICANT CHANGE UP (ref 0–0)
NRBC BLD AUTO-RTO: 0 /100 WBCS — SIGNIFICANT CHANGE UP (ref 0–0)
PLATELET # BLD AUTO: 220 K/UL — SIGNIFICANT CHANGE UP (ref 150–400)
PMV BLD: 11.3 FL — SIGNIFICANT CHANGE UP (ref 7–13)
RBC # BLD: 3.83 M/UL — LOW (ref 4.2–5.8)
RBC # FLD: 15.5 % — HIGH (ref 10.3–14.5)
WBC # BLD: 5.29 K/UL — SIGNIFICANT CHANGE UP (ref 3.8–10.5)
WBC # FLD AUTO: 5.29 K/UL — SIGNIFICANT CHANGE UP (ref 3.8–10.5)

## 2025-06-10 LAB
ALBUMIN SERPL ELPH-MCNC: 4 G/DL — SIGNIFICANT CHANGE UP (ref 3.3–5)
ALP SERPL-CCNC: 87 U/L — SIGNIFICANT CHANGE UP (ref 40–120)
ALT FLD-CCNC: 25 U/L — SIGNIFICANT CHANGE UP (ref 10–50)
ANION GAP SERPL CALC-SCNC: 13 MMOL/L — SIGNIFICANT CHANGE UP (ref 5–17)
AST SERPL-CCNC: 31 U/L — SIGNIFICANT CHANGE UP (ref 10–40)
BILIRUB SERPL-MCNC: 0.8 MG/DL — SIGNIFICANT CHANGE UP (ref 0.2–1.2)
BUN SERPL-MCNC: 24 MG/DL — HIGH (ref 7–23)
CALCIUM SERPL-MCNC: 9.4 MG/DL — SIGNIFICANT CHANGE UP (ref 8.4–10.5)
CHLORIDE SERPL-SCNC: 106 MMOL/L — SIGNIFICANT CHANGE UP (ref 96–108)
CO2 SERPL-SCNC: 22 MMOL/L — SIGNIFICANT CHANGE UP (ref 22–31)
CREAT SERPL-MCNC: 1.45 MG/DL — HIGH (ref 0.5–1.3)
EGFR: 52 ML/MIN/1.73M2 — LOW
EGFR: 52 ML/MIN/1.73M2 — LOW
GLUCOSE SERPL-MCNC: 230 MG/DL — HIGH (ref 70–99)
POTASSIUM SERPL-MCNC: 4.6 MMOL/L — SIGNIFICANT CHANGE UP (ref 3.5–5.3)
POTASSIUM SERPL-SCNC: 4.6 MMOL/L — SIGNIFICANT CHANGE UP (ref 3.5–5.3)
PROT SERPL-MCNC: 6.9 G/DL — SIGNIFICANT CHANGE UP (ref 6–8.3)
SODIUM SERPL-SCNC: 141 MMOL/L — SIGNIFICANT CHANGE UP (ref 135–145)

## 2025-06-11 ENCOUNTER — APPOINTMENT (OUTPATIENT)
Age: 70
End: 2025-06-11

## 2025-06-11 DIAGNOSIS — Z51.11 ENCOUNTER FOR ANTINEOPLASTIC CHEMOTHERAPY: ICD-10-CM

## 2025-06-11 DIAGNOSIS — R11.2 NAUSEA WITH VOMITING, UNSPECIFIED: ICD-10-CM

## 2025-06-11 DIAGNOSIS — C18.9 MALIGNANT NEOPLASM OF COLON, UNSPECIFIED: ICD-10-CM

## 2025-06-17 ENCOUNTER — APPOINTMENT (OUTPATIENT)
Dept: HEMATOLOGY ONCOLOGY | Facility: CLINIC | Age: 70
End: 2025-06-17
Payer: MEDICARE

## 2025-06-17 VITALS
SYSTOLIC BLOOD PRESSURE: 91 MMHG | OXYGEN SATURATION: 98 % | HEART RATE: 72 BPM | DIASTOLIC BLOOD PRESSURE: 80 MMHG | HEIGHT: 71 IN | WEIGHT: 252.2 LBS | TEMPERATURE: 98.4 F | BODY MASS INDEX: 35.31 KG/M2

## 2025-06-17 PROCEDURE — 99214 OFFICE O/P EST MOD 30 MIN: CPT

## 2025-06-23 ENCOUNTER — APPOINTMENT (OUTPATIENT)
Dept: HEMATOLOGY ONCOLOGY | Facility: CLINIC | Age: 70
End: 2025-06-23

## 2025-06-23 ENCOUNTER — RESULT REVIEW (OUTPATIENT)
Age: 70
End: 2025-06-23

## 2025-06-23 ENCOUNTER — APPOINTMENT (OUTPATIENT)
Age: 70
End: 2025-06-23

## 2025-06-23 LAB
ANISOCYTOSIS BLD QL: SLIGHT — SIGNIFICANT CHANGE UP
BASOPHILS # BLD AUTO: 0.02 K/UL — SIGNIFICANT CHANGE UP (ref 0–0.2)
BASOPHILS # BLD MANUAL: 0.02 K/UL — SIGNIFICANT CHANGE UP (ref 0–0.2)
BASOPHILS NFR BLD AUTO: 0.9 % — SIGNIFICANT CHANGE UP (ref 0–2)
BASOPHILS NFR BLD MANUAL: 1 % — SIGNIFICANT CHANGE UP (ref 0–2)
EOSINOPHIL # BLD AUTO: 0.07 K/UL — SIGNIFICANT CHANGE UP (ref 0–0.5)
EOSINOPHIL # BLD MANUAL: 0.02 K/UL — SIGNIFICANT CHANGE UP (ref 0–0.5)
EOSINOPHIL NFR BLD AUTO: 3.3 % — SIGNIFICANT CHANGE UP (ref 0–6)
EOSINOPHIL NFR BLD MANUAL: 1 % — SIGNIFICANT CHANGE UP (ref 0–6)
HCT VFR BLD CALC: 33.1 % — LOW (ref 39–50)
HGB BLD-MCNC: 10.6 G/DL — LOW (ref 13–17)
IMM GRANULOCYTES # BLD AUTO: 0.02 K/UL — SIGNIFICANT CHANGE UP (ref 0–0.07)
IMM GRANULOCYTES NFR BLD AUTO: 0.9 % — SIGNIFICANT CHANGE UP (ref 0–0.9)
LYMPHOCYTES # BLD AUTO: 1.04 K/UL — SIGNIFICANT CHANGE UP (ref 1–3.3)
LYMPHOCYTES # BLD MANUAL: 0.94 K/UL — LOW (ref 1–3.3)
LYMPHOCYTES NFR BLD AUTO: 48.6 % — HIGH (ref 13–44)
LYMPHOCYTES NFR BLD MANUAL: 44 % — SIGNIFICANT CHANGE UP (ref 13–44)
MACROCYTES BLD QL: SLIGHT — SIGNIFICANT CHANGE UP
MANUAL MYELOCYTE #: 0.02 K/UL — HIGH (ref 0–0)
MANUAL NEUTROPHIL BANDS #: 0.02 K/UL — SIGNIFICANT CHANGE UP (ref 0–0.84)
MANUAL REACTIVE LYMPHOCYTES #: 0.04 K/UL — SIGNIFICANT CHANGE UP (ref 0–0.63)
MANUAL SMEAR VERIFICATION: SIGNIFICANT CHANGE UP
MCHC RBC-ENTMCNC: 28.7 PG — SIGNIFICANT CHANGE UP (ref 27–34)
MCHC RBC-ENTMCNC: 32 G/DL — SIGNIFICANT CHANGE UP (ref 32–36)
MCV RBC AUTO: 89.7 FL — SIGNIFICANT CHANGE UP (ref 80–100)
MICROCYTES BLD QL: SLIGHT — SIGNIFICANT CHANGE UP
MONOCYTES # BLD AUTO: 0.53 K/UL — SIGNIFICANT CHANGE UP (ref 0–0.9)
MONOCYTES # BLD MANUAL: 0.49 K/UL — SIGNIFICANT CHANGE UP (ref 0–0.9)
MONOCYTES NFR BLD AUTO: 24.8 % — HIGH (ref 2–14)
MONOCYTES NFR BLD MANUAL: 23 % — HIGH (ref 2–14)
MYELOCYTES NFR BLD: 1 % — HIGH (ref 0–0)
NEUTROPHILS # BLD AUTO: 0.46 K/UL — LOW (ref 1.8–7.4)
NEUTROPHILS # BLD MANUAL: 0.58 K/UL — LOW (ref 1.8–7.4)
NEUTROPHILS NFR BLD AUTO: 21.5 % — LOW (ref 43–77)
NEUTROPHILS NFR BLD MANUAL: 27 % — LOW (ref 43–77)
NEUTS BAND # BLD: 1 % — SIGNIFICANT CHANGE UP (ref 0–8)
NEUTS BAND NFR BLD: 1 % — SIGNIFICANT CHANGE UP (ref 0–8)
NRBC # BLD AUTO: 0 K/UL — SIGNIFICANT CHANGE UP (ref 0–0)
NRBC # FLD: 0 K/UL — SIGNIFICANT CHANGE UP (ref 0–0)
NRBC BLD AUTO-RTO: 0 /100 WBCS — SIGNIFICANT CHANGE UP (ref 0–0)
OVALOCYTES BLD QL SMEAR: SLIGHT — SIGNIFICANT CHANGE UP
PLAT MORPH BLD: NORMAL — SIGNIFICANT CHANGE UP
PLATELET # BLD AUTO: 321 K/UL — SIGNIFICANT CHANGE UP (ref 150–400)
PMV BLD: 11.2 FL — SIGNIFICANT CHANGE UP (ref 7–13)
RBC # BLD: 3.69 M/UL — LOW (ref 4.2–5.8)
RBC # FLD: 14.5 % — SIGNIFICANT CHANGE UP (ref 10.3–14.5)
RBC BLD AUTO: SIGNIFICANT CHANGE UP
VARIANT LYMPHS # BLD: 2 % — SIGNIFICANT CHANGE UP (ref 0–6)
VARIANT LYMPHS NFR BLD MANUAL: 2 % — SIGNIFICANT CHANGE UP (ref 0–6)
WBC # BLD: 2.14 K/UL — LOW (ref 3.8–10.5)
WBC # FLD AUTO: 2.14 K/UL — LOW (ref 3.8–10.5)

## 2025-06-30 ENCOUNTER — RESULT REVIEW (OUTPATIENT)
Age: 70
End: 2025-06-30

## 2025-06-30 ENCOUNTER — APPOINTMENT (OUTPATIENT)
Age: 70
End: 2025-06-30

## 2025-06-30 LAB
ALBUMIN SERPL ELPH-MCNC: 3.8 G/DL — SIGNIFICANT CHANGE UP (ref 3.3–5)
ALP SERPL-CCNC: 79 U/L — SIGNIFICANT CHANGE UP (ref 40–120)
ALT FLD-CCNC: 20 U/L — SIGNIFICANT CHANGE UP (ref 10–50)
ANION GAP SERPL CALC-SCNC: 13 MMOL/L — SIGNIFICANT CHANGE UP (ref 5–17)
AST SERPL-CCNC: 41 U/L — HIGH (ref 10–40)
BASOPHILS # BLD AUTO: 0.09 K/UL — SIGNIFICANT CHANGE UP (ref 0–0.2)
BASOPHILS NFR BLD AUTO: 1.3 % — SIGNIFICANT CHANGE UP (ref 0–2)
BILIRUB SERPL-MCNC: 0.5 MG/DL — SIGNIFICANT CHANGE UP (ref 0.2–1.2)
BUN SERPL-MCNC: 22 MG/DL — SIGNIFICANT CHANGE UP (ref 7–23)
CALCIUM SERPL-MCNC: 9.6 MG/DL — SIGNIFICANT CHANGE UP (ref 8.4–10.5)
CHLORIDE SERPL-SCNC: 105 MMOL/L — SIGNIFICANT CHANGE UP (ref 96–108)
CO2 SERPL-SCNC: 21 MMOL/L — LOW (ref 22–31)
CREAT SERPL-MCNC: 1.27 MG/DL — SIGNIFICANT CHANGE UP (ref 0.5–1.3)
EGFR: 60 ML/MIN/1.73M2 — SIGNIFICANT CHANGE UP
EGFR: 60 ML/MIN/1.73M2 — SIGNIFICANT CHANGE UP
EOSINOPHIL # BLD AUTO: 0.09 K/UL — SIGNIFICANT CHANGE UP (ref 0–0.5)
EOSINOPHIL NFR BLD AUTO: 1.3 % — SIGNIFICANT CHANGE UP (ref 0–6)
GLUCOSE SERPL-MCNC: 91 MG/DL — SIGNIFICANT CHANGE UP (ref 70–99)
HCT VFR BLD CALC: 33.1 % — LOW (ref 39–50)
HGB BLD-MCNC: 10.6 G/DL — LOW (ref 13–17)
IMM GRANULOCYTES # BLD AUTO: 0.21 K/UL — HIGH (ref 0–0.07)
IMM GRANULOCYTES NFR BLD AUTO: 3.1 % — HIGH (ref 0–0.9)
LYMPHOCYTES # BLD AUTO: 1.41 K/UL — SIGNIFICANT CHANGE UP (ref 1–3.3)
LYMPHOCYTES NFR BLD AUTO: 20.5 % — SIGNIFICANT CHANGE UP (ref 13–44)
MCHC RBC-ENTMCNC: 28.7 PG — SIGNIFICANT CHANGE UP (ref 27–34)
MCHC RBC-ENTMCNC: 32 G/DL — SIGNIFICANT CHANGE UP (ref 32–36)
MCV RBC AUTO: 89.7 FL — SIGNIFICANT CHANGE UP (ref 80–100)
MONOCYTES # BLD AUTO: 0.65 K/UL — SIGNIFICANT CHANGE UP (ref 0–0.9)
MONOCYTES NFR BLD AUTO: 9.5 % — SIGNIFICANT CHANGE UP (ref 2–14)
NEUTROPHILS # BLD AUTO: 4.42 K/UL — SIGNIFICANT CHANGE UP (ref 1.8–7.4)
NEUTROPHILS NFR BLD AUTO: 64.3 % — SIGNIFICANT CHANGE UP (ref 43–77)
NRBC # BLD AUTO: 0 K/UL — SIGNIFICANT CHANGE UP (ref 0–0)
NRBC # FLD: 0 K/UL — SIGNIFICANT CHANGE UP (ref 0–0)
NRBC BLD AUTO-RTO: 0 /100 WBCS — SIGNIFICANT CHANGE UP (ref 0–0)
PLATELET # BLD AUTO: 416 K/UL — HIGH (ref 150–400)
PMV BLD: 10.4 FL — SIGNIFICANT CHANGE UP (ref 7–13)
POTASSIUM SERPL-MCNC: 4.5 MMOL/L — SIGNIFICANT CHANGE UP (ref 3.5–5.3)
POTASSIUM SERPL-SCNC: 4.5 MMOL/L — SIGNIFICANT CHANGE UP (ref 3.5–5.3)
PROT SERPL-MCNC: 7.6 G/DL — SIGNIFICANT CHANGE UP (ref 6–8.3)
RBC # BLD: 3.69 M/UL — LOW (ref 4.2–5.8)
RBC # FLD: 14.5 % — SIGNIFICANT CHANGE UP (ref 10.3–14.5)
SODIUM SERPL-SCNC: 139 MMOL/L — SIGNIFICANT CHANGE UP (ref 135–145)
WBC # BLD: 6.87 K/UL — SIGNIFICANT CHANGE UP (ref 3.8–10.5)
WBC # FLD AUTO: 6.87 K/UL — SIGNIFICANT CHANGE UP (ref 3.8–10.5)

## 2025-07-02 ENCOUNTER — APPOINTMENT (OUTPATIENT)
Age: 70
End: 2025-07-02

## 2025-07-03 DIAGNOSIS — Z51.89 ENCOUNTER FOR OTHER SPECIFIED AFTERCARE: ICD-10-CM

## 2025-07-11 ENCOUNTER — APPOINTMENT (OUTPATIENT)
Age: 70
End: 2025-07-11

## 2025-07-14 ENCOUNTER — RESULT REVIEW (OUTPATIENT)
Age: 70
End: 2025-07-14

## 2025-07-14 ENCOUNTER — APPOINTMENT (OUTPATIENT)
Dept: HEMATOLOGY ONCOLOGY | Facility: CLINIC | Age: 70
End: 2025-07-14

## 2025-07-14 ENCOUNTER — APPOINTMENT (OUTPATIENT)
Age: 70
End: 2025-07-14

## 2025-07-14 LAB
ANISOCYTOSIS BLD QL: SLIGHT — SIGNIFICANT CHANGE UP
BASOPHILS # BLD AUTO: 0.06 K/UL — SIGNIFICANT CHANGE UP (ref 0–0.2)
BASOPHILS # BLD MANUAL: 0.12 K/UL — SIGNIFICANT CHANGE UP (ref 0–0.2)
BASOPHILS NFR BLD AUTO: 0.5 % — SIGNIFICANT CHANGE UP (ref 0–2)
BASOPHILS NFR BLD MANUAL: 1 % — SIGNIFICANT CHANGE UP (ref 0–2)
EOSINOPHIL # BLD AUTO: 0.14 K/UL — SIGNIFICANT CHANGE UP (ref 0–0.5)
EOSINOPHIL # BLD MANUAL: 0.25 K/UL — SIGNIFICANT CHANGE UP (ref 0–0.5)
EOSINOPHIL NFR BLD AUTO: 1.1 % — SIGNIFICANT CHANGE UP (ref 0–6)
EOSINOPHIL NFR BLD MANUAL: 2 % — SIGNIFICANT CHANGE UP (ref 0–6)
HCT VFR BLD CALC: 33.3 % — LOW (ref 39–50)
HGB BLD-MCNC: 10.4 G/DL — LOW (ref 13–17)
IMM GRANULOCYTES # BLD AUTO: 0.75 K/UL — HIGH (ref 0–0.07)
IMM GRANULOCYTES NFR BLD AUTO: 6.1 % — HIGH (ref 0–0.9)
LG PLATELETS BLD QL AUTO: SLIGHT — SIGNIFICANT CHANGE UP
LYMPHOCYTES # BLD AUTO: 1.4 K/UL — SIGNIFICANT CHANGE UP (ref 1–3.3)
LYMPHOCYTES # BLD MANUAL: 0.37 K/UL — LOW (ref 1–3.3)
LYMPHOCYTES NFR BLD AUTO: 11.3 % — LOW (ref 13–44)
LYMPHOCYTES NFR BLD MANUAL: 3 % — LOW (ref 13–44)
MACROCYTES BLD QL: SLIGHT — SIGNIFICANT CHANGE UP
MANUAL METAMYELOCYTE #: 0.25 K/UL — HIGH (ref 0–0)
MANUAL MYELOCYTE #: 0.37 K/UL — HIGH (ref 0–0)
MANUAL NRBC #: 0.25 K/UL — HIGH (ref 0–0)
MANUAL SMEAR VERIFICATION: SIGNIFICANT CHANGE UP
MCHC RBC-ENTMCNC: 28.6 PG — SIGNIFICANT CHANGE UP (ref 27–34)
MCHC RBC-ENTMCNC: 31.2 G/DL — LOW (ref 32–36)
MCV RBC AUTO: 91.5 FL — SIGNIFICANT CHANGE UP (ref 80–100)
METAMYELOCYTES # FLD: 2 % — HIGH (ref 0–0)
METAMYELOCYTES NFR BLD: 2 % — HIGH (ref 0–0)
MICROCYTES BLD QL: SLIGHT — SIGNIFICANT CHANGE UP
MONOCYTES # BLD AUTO: 0.66 K/UL — SIGNIFICANT CHANGE UP (ref 0–0.9)
MONOCYTES # BLD MANUAL: 0.5 K/UL — SIGNIFICANT CHANGE UP (ref 0–0.9)
MONOCYTES NFR BLD AUTO: 5.3 % — SIGNIFICANT CHANGE UP (ref 2–14)
MONOCYTES NFR BLD MANUAL: 4 % — SIGNIFICANT CHANGE UP (ref 2–14)
MYELOCYTES NFR BLD: 3 % — HIGH (ref 0–0)
NEUTROPHILS # BLD AUTO: 9.38 K/UL — HIGH (ref 1.8–7.4)
NEUTROPHILS # BLD MANUAL: 10.53 K/UL — HIGH (ref 1.8–7.4)
NEUTROPHILS NFR BLD AUTO: 75.7 % — SIGNIFICANT CHANGE UP (ref 43–77)
NEUTROPHILS NFR BLD MANUAL: 85 % — HIGH (ref 43–77)
NRBC # BLD AUTO: 0.07 K/UL — HIGH (ref 0–0)
NRBC # BLD: 2 /100 WBCS — HIGH (ref 0–0)
NRBC # FLD: 0.07 K/UL — HIGH (ref 0–0)
NRBC BLD AUTO-RTO: 0 /100 WBCS — SIGNIFICANT CHANGE UP (ref 0–0)
NRBC BLD-RTO: 2 /100 WBCS — HIGH (ref 0–0)
PLAT MORPH BLD: NORMAL — SIGNIFICANT CHANGE UP
PLATELET # BLD AUTO: 116 K/UL — LOW (ref 150–400)
PMV BLD: 12.4 FL — SIGNIFICANT CHANGE UP (ref 7–13)
POLYCHROMASIA BLD QL SMEAR: SLIGHT — SIGNIFICANT CHANGE UP
RBC # BLD: 3.64 M/UL — LOW (ref 4.2–5.8)
RBC # FLD: 15.7 % — HIGH (ref 10.3–14.5)
RBC BLD AUTO: SIGNIFICANT CHANGE UP
TOXIC GRANULES BLD QL SMEAR: PRESENT
WBC # BLD: 12.39 K/UL — HIGH (ref 3.8–10.5)
WBC # FLD AUTO: 12.39 K/UL — HIGH (ref 3.8–10.5)

## 2025-07-15 LAB
ALBUMIN SERPL ELPH-MCNC: 3.9 G/DL — SIGNIFICANT CHANGE UP (ref 3.3–5)
ALP SERPL-CCNC: 117 U/L — SIGNIFICANT CHANGE UP (ref 40–120)
ALT FLD-CCNC: 21 U/L — SIGNIFICANT CHANGE UP (ref 10–50)
ANION GAP SERPL CALC-SCNC: 12 MMOL/L — SIGNIFICANT CHANGE UP (ref 5–17)
AST SERPL-CCNC: 42 U/L — HIGH (ref 10–40)
BILIRUB SERPL-MCNC: 0.5 MG/DL — SIGNIFICANT CHANGE UP (ref 0.2–1.2)
BUN SERPL-MCNC: 26 MG/DL — HIGH (ref 7–23)
CALCIUM SERPL-MCNC: 9.4 MG/DL — SIGNIFICANT CHANGE UP (ref 8.4–10.5)
CHLORIDE SERPL-SCNC: 108 MMOL/L — SIGNIFICANT CHANGE UP (ref 96–108)
CO2 SERPL-SCNC: 20 MMOL/L — LOW (ref 22–31)
CREAT SERPL-MCNC: 1.29 MG/DL — SIGNIFICANT CHANGE UP (ref 0.5–1.3)
EGFR: 59 ML/MIN/1.73M2 — LOW
EGFR: 59 ML/MIN/1.73M2 — LOW
GLUCOSE SERPL-MCNC: 298 MG/DL — HIGH (ref 70–99)
POTASSIUM SERPL-MCNC: 5 MMOL/L — SIGNIFICANT CHANGE UP (ref 3.5–5.3)
POTASSIUM SERPL-SCNC: 5 MMOL/L — SIGNIFICANT CHANGE UP (ref 3.5–5.3)
PROT SERPL-MCNC: 6.9 G/DL — SIGNIFICANT CHANGE UP (ref 6–8.3)
SODIUM SERPL-SCNC: 140 MMOL/L — SIGNIFICANT CHANGE UP (ref 135–145)

## 2025-07-16 ENCOUNTER — APPOINTMENT (OUTPATIENT)
Age: 70
End: 2025-07-16

## 2025-07-16 ENCOUNTER — APPOINTMENT (OUTPATIENT)
Dept: HEMATOLOGY ONCOLOGY | Facility: CLINIC | Age: 70
End: 2025-07-16
Payer: MEDICARE

## 2025-07-16 VITALS
OXYGEN SATURATION: 96 % | SYSTOLIC BLOOD PRESSURE: 117 MMHG | TEMPERATURE: 98.5 F | DIASTOLIC BLOOD PRESSURE: 64 MMHG | HEART RATE: 71 BPM | HEIGHT: 71 IN

## 2025-07-16 PROCEDURE — 99214 OFFICE O/P EST MOD 30 MIN: CPT

## 2025-07-24 ENCOUNTER — APPOINTMENT (OUTPATIENT)
Dept: HEMATOLOGY ONCOLOGY | Facility: CLINIC | Age: 70
End: 2025-07-24

## 2025-07-24 ENCOUNTER — LABORATORY RESULT (OUTPATIENT)
Age: 70
End: 2025-07-24

## 2025-07-24 LAB
APPEARANCE: ABNORMAL
BILIRUBIN URINE: ABNORMAL
BLOOD URINE: NEGATIVE
COLOR: NORMAL
GLUCOSE QUALITATIVE U: NEGATIVE MG/DL
KETONES URINE: ABNORMAL MG/DL
LEUKOCYTE ESTERASE URINE: ABNORMAL
NITRITE URINE: NEGATIVE
PH URINE: 5
PROTEIN URINE: 100 MG/DL
SPECIFIC GRAVITY URINE: >1.03
UROBILINOGEN URINE: 1 MG/DL

## 2025-07-28 ENCOUNTER — RESULT REVIEW (OUTPATIENT)
Age: 70
End: 2025-07-28

## 2025-07-28 ENCOUNTER — APPOINTMENT (OUTPATIENT)
Age: 70
End: 2025-07-28

## 2025-07-28 ENCOUNTER — APPOINTMENT (OUTPATIENT)
Dept: HEMATOLOGY ONCOLOGY | Facility: CLINIC | Age: 70
End: 2025-07-28

## 2025-07-30 ENCOUNTER — APPOINTMENT (OUTPATIENT)
Age: 70
End: 2025-07-30

## 2025-08-11 ENCOUNTER — RESULT REVIEW (OUTPATIENT)
Age: 70
End: 2025-08-11

## 2025-08-11 ENCOUNTER — APPOINTMENT (OUTPATIENT)
Age: 70
End: 2025-08-11

## 2025-08-13 ENCOUNTER — APPOINTMENT (OUTPATIENT)
Age: 70
End: 2025-08-13

## 2025-08-13 ENCOUNTER — APPOINTMENT (OUTPATIENT)
Dept: HEMATOLOGY ONCOLOGY | Facility: CLINIC | Age: 70
End: 2025-08-13
Payer: MEDICARE

## 2025-08-13 VITALS
WEIGHT: 239.31 LBS | HEIGHT: 71 IN | BODY MASS INDEX: 33.5 KG/M2 | TEMPERATURE: 97.6 F | SYSTOLIC BLOOD PRESSURE: 138 MMHG | HEART RATE: 80 BPM | DIASTOLIC BLOOD PRESSURE: 54 MMHG | OXYGEN SATURATION: 94 %

## 2025-08-13 DIAGNOSIS — C18.9 MALIGNANT NEOPLASM OF COLON, UNSPECIFIED: ICD-10-CM

## 2025-08-13 PROCEDURE — 99214 OFFICE O/P EST MOD 30 MIN: CPT

## 2025-08-25 ENCOUNTER — APPOINTMENT (OUTPATIENT)
Age: 70
End: 2025-08-25

## 2025-08-25 ENCOUNTER — RESULT REVIEW (OUTPATIENT)
Age: 70
End: 2025-08-25

## 2025-08-27 ENCOUNTER — APPOINTMENT (OUTPATIENT)
Age: 70
End: 2025-08-27

## 2025-09-03 ENCOUNTER — OUTPATIENT (OUTPATIENT)
Dept: OUTPATIENT SERVICES | Facility: HOSPITAL | Age: 70
LOS: 1 days | End: 2025-09-03
Payer: MEDICARE

## 2025-09-03 ENCOUNTER — APPOINTMENT (OUTPATIENT)
Dept: CT IMAGING | Facility: CLINIC | Age: 70
End: 2025-09-03

## 2025-09-03 DIAGNOSIS — C18.9 MALIGNANT NEOPLASM OF COLON, UNSPECIFIED: ICD-10-CM

## 2025-09-03 DIAGNOSIS — Z98.89 OTHER SPECIFIED POSTPROCEDURAL STATES: Chronic | ICD-10-CM

## 2025-09-03 PROCEDURE — 71260 CT THORAX DX C+: CPT | Mod: 26

## 2025-09-03 PROCEDURE — 74177 CT ABD & PELVIS W/CONTRAST: CPT | Mod: 26

## 2025-09-08 ENCOUNTER — APPOINTMENT (OUTPATIENT)
Dept: HEMATOLOGY ONCOLOGY | Facility: CLINIC | Age: 70
End: 2025-09-08
Payer: MEDICARE

## 2025-09-08 VITALS
BODY MASS INDEX: 32.13 KG/M2 | TEMPERATURE: 97.8 F | WEIGHT: 229.5 LBS | OXYGEN SATURATION: 96 % | HEART RATE: 65 BPM | DIASTOLIC BLOOD PRESSURE: 65 MMHG | SYSTOLIC BLOOD PRESSURE: 161 MMHG | HEIGHT: 71 IN

## 2025-09-08 PROCEDURE — G2211 COMPLEX E/M VISIT ADD ON: CPT

## 2025-09-08 PROCEDURE — 99215 OFFICE O/P EST HI 40 MIN: CPT

## 2025-09-15 ENCOUNTER — RESULT REVIEW (OUTPATIENT)
Age: 70
End: 2025-09-15

## 2025-09-15 ENCOUNTER — APPOINTMENT (OUTPATIENT)
Age: 70
End: 2025-09-15

## 2025-09-16 ENCOUNTER — APPOINTMENT (OUTPATIENT)
Age: 70
End: 2025-09-16

## 2025-09-17 ENCOUNTER — APPOINTMENT (OUTPATIENT)
Age: 70
End: 2025-09-17

## (undated) DEVICE — DRAPE TOWEL BLUE 17" X 24"

## (undated) DEVICE — XI DRAPE COLUMN

## (undated) DEVICE — DRAPE ROBOTIC

## (undated) DEVICE — PRECISION CUT SCISSOR MICROLINE MINI ENDOCUT DISP

## (undated) DEVICE — TROCAR SURGIQUEST AIRSEAL 5MM X 100MM

## (undated) DEVICE — SUT VICRYL 0 27" UR-6

## (undated) DEVICE — XI ARM RETRACTOR GRASPING SMALL

## (undated) DEVICE — D HELP - CLEARVIEW CLEARIFY SYSTEM

## (undated) DEVICE — Device

## (undated) DEVICE — XI CORD MONOPOLAR CAUTERY (GREEN)

## (undated) DEVICE — XI DRAPE ARM

## (undated) DEVICE — SUT PDS II 1 48" TP-1

## (undated) DEVICE — PACK ROBOTIC

## (undated) DEVICE — VENODYNE/SCD SLEEVE CALF MEDIUM

## (undated) DEVICE — TRAY IRRIGATION SYR BULB 60CC

## (undated) DEVICE — PREP CHLORAPREP HI-LITE ORANGE 26ML

## (undated) DEVICE — XI OBTURATOR OPTICAL BLADELESS 8MM

## (undated) DEVICE — STAPLER SKIN PROXIMATE

## (undated) DEVICE — POSITIONER FOAM EGG CRATE ULNAR 2PCS (PINK)

## (undated) DEVICE — WARMING BLANKET UPPER ADULT

## (undated) DEVICE — XI ARM FORCEP FENESTRATED BIPOLAR 8MM

## (undated) DEVICE — XI VESSEL SEALER

## (undated) DEVICE — DRSG DERMABOND 0.7ML

## (undated) DEVICE — VALVE ENDOSCOPE DEFENDO SINGLE USE

## (undated) DEVICE — TUBING CONNECTING 6MM 20FT

## (undated) DEVICE — LIGASURE IMPACT

## (undated) DEVICE — SNARE POLYP HEXAGONAL SM 13X2.4X240

## (undated) DEVICE — TRAP E POLY

## (undated) DEVICE — SUT VICRYL PLUS 4-0 18" PS-2 UNDYED

## (undated) DEVICE — PACK MINOR WITH LAP

## (undated) DEVICE — KIT SIGMOIDOSCOPE NO SWAB SGL USE

## (undated) DEVICE — SUT VICRYL 2-0 27" CT-1 UNDYED

## (undated) DEVICE — TUBING AIRSEAL TRI-LUMEN FILTERED

## (undated) DEVICE — SOL IRR POUR NS 0.9% 500ML

## (undated) DEVICE — FORCEP RADIAL JAW 4 240CM DISP

## (undated) DEVICE — SOL IRR POUR NS 0.9% 1000ML

## (undated) DEVICE — ELCTR BOVIE PENCIL SMOKE EVACUATION 15FT

## (undated) DEVICE — SUT CHROMIC 3-0 27" SH

## (undated) DEVICE — PREP BETADINE KIT

## (undated) DEVICE — STAPLER COVIDIEN ENDO GIA STANDARD HANDLE

## (undated) DEVICE — ELCTR GROUNDING PAD ADULT COVIDIEN

## (undated) DEVICE — XI ARM SCISSOR MONO CURVED

## (undated) DEVICE — LIGASURE L-HOOK 44CM

## (undated) DEVICE — XI CORD BIPOLAR CAUTERY (BLUE)

## (undated) DEVICE — SOL IRR POUR H2O 1000ML

## (undated) DEVICE — SUCTION YANKAUER TAPERED BULBOUS NO VENT

## (undated) DEVICE — XI TIP COVER